# Patient Record
Sex: FEMALE | Race: WHITE | NOT HISPANIC OR LATINO | Employment: UNEMPLOYED | URBAN - METROPOLITAN AREA
[De-identification: names, ages, dates, MRNs, and addresses within clinical notes are randomized per-mention and may not be internally consistent; named-entity substitution may affect disease eponyms.]

---

## 2020-09-08 RX ORDER — LOSARTAN POTASSIUM 50 MG/1
50 TABLET ORAL DAILY
Qty: 30 TABLET | Refills: 0 | Status: SHIPPED | OUTPATIENT
Start: 2020-09-08 | End: 2020-09-14 | Stop reason: SDUPTHER

## 2020-09-08 NOTE — TELEPHONE ENCOUNTER
----- Message from Lucio Patel sent at 9/8/2020  1:26 PM CDT -----  Regarding: refill  Losartan?   Pt called states she needs this refilled  and called to [facility] on [address].  Scheduled appt for pt on Mon. 9/14/2020

## 2020-09-14 ENCOUNTER — OFFICE VISIT (OUTPATIENT)
Dept: FAMILY MEDICINE | Facility: CLINIC | Age: 50
End: 2020-09-14
Payer: MEDICAID

## 2020-09-14 ENCOUNTER — HOSPITAL ENCOUNTER (OUTPATIENT)
Dept: RADIOLOGY | Facility: HOSPITAL | Age: 50
Discharge: HOME | End: 2020-09-14
Attending: FAMILY MEDICINE
Payer: MEDICAID

## 2020-09-14 VITALS
HEART RATE: 83 BPM | HEIGHT: 60 IN | SYSTOLIC BLOOD PRESSURE: 152 MMHG | WEIGHT: 182 LBS | DIASTOLIC BLOOD PRESSURE: 102 MMHG | TEMPERATURE: 98 F | OXYGEN SATURATION: 97 % | BODY MASS INDEX: 35.73 KG/M2

## 2020-09-14 DIAGNOSIS — F32.A DEPRESSION, UNSPECIFIED DEPRESSION TYPE: ICD-10-CM

## 2020-09-14 DIAGNOSIS — R06.02 SOB (SHORTNESS OF BREATH): ICD-10-CM

## 2020-09-14 DIAGNOSIS — Z12.31 BREAST CANCER SCREENING BY MAMMOGRAM: ICD-10-CM

## 2020-09-14 DIAGNOSIS — E78.5 HYPERLIPIDEMIA, UNSPECIFIED HYPERLIPIDEMIA TYPE: ICD-10-CM

## 2020-09-14 DIAGNOSIS — M17.9 OSTEOARTHRITIS OF KNEE, UNSPECIFIED LATERALITY, UNSPECIFIED OSTEOARTHRITIS TYPE: ICD-10-CM

## 2020-09-14 DIAGNOSIS — I10 HYPERTENSION, ESSENTIAL: Primary | ICD-10-CM

## 2020-09-14 PROCEDURE — 99214 OFFICE O/P EST MOD 30 MIN: CPT | Mod: S$GLB,,, | Performed by: FAMILY MEDICINE

## 2020-09-14 PROCEDURE — 99214 PR OFFICE/OUTPT VISIT, EST, LEVL IV, 30-39 MIN: ICD-10-PCS | Mod: S$GLB,,, | Performed by: FAMILY MEDICINE

## 2020-09-14 PROCEDURE — 71046 X-RAY EXAM CHEST 2 VIEWS: CPT | Mod: TC

## 2020-09-14 RX ORDER — LOSARTAN POTASSIUM 50 MG/1
100 TABLET ORAL DAILY
Qty: 30 TABLET | Refills: 5 | Status: SHIPPED | OUTPATIENT
Start: 2020-09-14 | End: 2020-09-22 | Stop reason: ALTCHOICE

## 2020-09-14 RX ORDER — VENLAFAXINE HYDROCHLORIDE 75 MG/1
75 CAPSULE, EXTENDED RELEASE ORAL DAILY
Qty: 30 CAPSULE | Refills: 2 | Status: SHIPPED | OUTPATIENT
Start: 2020-09-14 | End: 2020-10-05 | Stop reason: SDUPTHER

## 2020-09-15 NOTE — PROGRESS NOTES
50-year-old female here for checkup.  Here recently with infected toe did clear no foreign or came of the.  Due for laboratory testing and COVID back in July.  Still feels a short of breath.  No cough.  No history of asthma.  Nonsmoker.  Nose is still congested.  Does exercise 3 times a week no chest palpitations.  No PND orthopnea pedal.    Hypertension has not taken the medication yet today.  Home 50.  Very stressed.  Home his blood twice in the past.  Storm approaching need med.  Depression.  Out a worker's comp injury for 3 years.  Injury unsteadiness.  Thyroid job.  Surgery on the knee twice.  Still pain lb told she replacement.  Sees Dr. Anne.  Weight has up 4 lb.  There depressed took medication for depression years ago with a prior.  Wellbutrin helps some but exercise better with a no interest in usual things that she likes to do.  Sits does not do her she should.    Cardiovascular slight having feeling chest now.  Cholesterol checked 1 HDL 5131.  This was year ago.  At 1 point cholesterol was 207 and.  Family history hypertension hyperlipidemia.  Grandfather a pacemaker.  Has had breast cancer.  She had BRCA test it was negative.  Social history no cigarette use no alcohol use.    Impression hypertension.  Depression.  Shortness of breath.  Degenerative joint disease of knee.  Hyperlipidemia.  BMI 35 obesity.  Breast cancer screening.    Plan start Effexor XR 75 daily 30 with 2 refills.  Mammogram ordered.  Follow-up in 1 month.  Chest x-ray ordered.  CBC CMP lipids TSH ordered.  Increase her losartan to 100 mg number 30 with 5 refills..

## 2020-09-22 ENCOUNTER — OFFICE VISIT (OUTPATIENT)
Dept: FAMILY MEDICINE | Facility: CLINIC | Age: 50
End: 2020-09-22
Payer: MEDICAID

## 2020-09-22 ENCOUNTER — LAB VISIT (OUTPATIENT)
Dept: LAB | Facility: HOSPITAL | Age: 50
End: 2020-09-22
Attending: FAMILY MEDICINE
Payer: MEDICAID

## 2020-09-22 VITALS
DIASTOLIC BLOOD PRESSURE: 96 MMHG | HEART RATE: 71 BPM | WEIGHT: 182 LBS | TEMPERATURE: 98 F | OXYGEN SATURATION: 98 % | HEIGHT: 60 IN | SYSTOLIC BLOOD PRESSURE: 146 MMHG | BODY MASS INDEX: 35.73 KG/M2

## 2020-09-22 DIAGNOSIS — I10 HYPERTENSION, ESSENTIAL: ICD-10-CM

## 2020-09-22 DIAGNOSIS — E78.5 HYPERLIPIDEMIA, UNSPECIFIED HYPERLIPIDEMIA TYPE: ICD-10-CM

## 2020-09-22 DIAGNOSIS — I10 HYPERTENSION, ESSENTIAL: Primary | ICD-10-CM

## 2020-09-22 DIAGNOSIS — R06.02 SOB (SHORTNESS OF BREATH): ICD-10-CM

## 2020-09-22 DIAGNOSIS — F32.A DEPRESSION, UNSPECIFIED DEPRESSION TYPE: ICD-10-CM

## 2020-09-22 LAB
ALBUMIN SERPL BCP-MCNC: 3.8 G/DL (ref 3.5–5.2)
ALP SERPL-CCNC: 50 U/L (ref 55–135)
ALT SERPL W/O P-5'-P-CCNC: 16 U/L (ref 10–44)
ANION GAP SERPL CALC-SCNC: 7 MMOL/L (ref 8–16)
AST SERPL-CCNC: 15 U/L (ref 10–40)
BASOPHILS # BLD AUTO: 0.06 K/UL (ref 0–0.2)
BASOPHILS NFR BLD: 0.9 % (ref 0–1.9)
BILIRUB SERPL-MCNC: 0.6 MG/DL (ref 0.1–1)
BUN SERPL-MCNC: 16 MG/DL (ref 6–20)
CALCIUM SERPL-MCNC: 8.8 MG/DL (ref 8.7–10.5)
CHLORIDE SERPL-SCNC: 98 MMOL/L (ref 95–110)
CHOLEST SERPL-MCNC: 260 MG/DL (ref 120–199)
CHOLEST/HDLC SERPL: 5.7 {RATIO} (ref 2–5)
CO2 SERPL-SCNC: 32 MMOL/L (ref 23–29)
CREAT SERPL-MCNC: 0.6 MG/DL (ref 0.5–1.4)
DIFFERENTIAL METHOD: ABNORMAL
EOSINOPHIL # BLD AUTO: 0.3 K/UL (ref 0–0.5)
EOSINOPHIL NFR BLD: 4.8 % (ref 0–8)
ERYTHROCYTE [DISTWIDTH] IN BLOOD BY AUTOMATED COUNT: 17.8 % (ref 11.5–14.5)
EST. GFR  (AFRICAN AMERICAN): >60 ML/MIN/1.73 M^2
EST. GFR  (NON AFRICAN AMERICAN): >60 ML/MIN/1.73 M^2
GLUCOSE SERPL-MCNC: 96 MG/DL (ref 70–110)
HCT VFR BLD AUTO: 38.9 % (ref 37–48.5)
HDLC SERPL-MCNC: 46 MG/DL (ref 40–75)
HDLC SERPL: 17.7 % (ref 20–50)
HGB BLD-MCNC: 11.5 G/DL (ref 12–16)
IMM GRANULOCYTES # BLD AUTO: 0.02 K/UL (ref 0–0.04)
IMM GRANULOCYTES NFR BLD AUTO: 0.3 % (ref 0–0.5)
LDLC SERPL CALC-MCNC: 198.2 MG/DL (ref 63–159)
LYMPHOCYTES # BLD AUTO: 2.1 K/UL (ref 1–4.8)
LYMPHOCYTES NFR BLD: 29.6 % (ref 18–48)
MCH RBC QN AUTO: 18.4 PG (ref 27–31)
MCHC RBC AUTO-ENTMCNC: 29.6 G/DL (ref 32–36)
MCV RBC AUTO: 62 FL (ref 82–98)
MONOCYTES # BLD AUTO: 0.5 K/UL (ref 0.3–1)
MONOCYTES NFR BLD: 6.5 % (ref 4–15)
NEUTROPHILS # BLD AUTO: 4.1 K/UL (ref 1.8–7.7)
NEUTROPHILS NFR BLD: 57.9 % (ref 38–73)
NONHDLC SERPL-MCNC: 214 MG/DL
NRBC BLD-RTO: 0 /100 WBC
PLATELET # BLD AUTO: 335 K/UL (ref 150–350)
PMV BLD AUTO: 10.6 FL (ref 9.2–12.9)
POTASSIUM SERPL-SCNC: 3.2 MMOL/L (ref 3.5–5.1)
PROT SERPL-MCNC: 6.9 G/DL (ref 6–8.4)
RBC # BLD AUTO: 6.25 M/UL (ref 4–5.4)
SODIUM SERPL-SCNC: 137 MMOL/L (ref 136–145)
TRIGL SERPL-MCNC: 79 MG/DL (ref 30–150)
TSH SERPL DL<=0.005 MIU/L-ACNC: 2.71 UIU/ML (ref 0.34–5.6)
WBC # BLD AUTO: 7.05 K/UL (ref 3.9–12.7)

## 2020-09-22 PROCEDURE — 99213 OFFICE O/P EST LOW 20 MIN: CPT | Mod: S$GLB,,, | Performed by: FAMILY MEDICINE

## 2020-09-22 PROCEDURE — 80053 COMPREHEN METABOLIC PANEL: CPT

## 2020-09-22 PROCEDURE — 80061 LIPID PANEL: CPT

## 2020-09-22 PROCEDURE — 84443 ASSAY THYROID STIM HORMONE: CPT

## 2020-09-22 PROCEDURE — 36415 COLL VENOUS BLD VENIPUNCTURE: CPT

## 2020-09-22 PROCEDURE — 85025 COMPLETE CBC W/AUTO DIFF WBC: CPT

## 2020-09-22 PROCEDURE — 99213 PR OFFICE/OUTPT VISIT, EST, LEVL III, 20-29 MIN: ICD-10-PCS | Mod: S$GLB,,, | Performed by: FAMILY MEDICINE

## 2020-09-22 RX ORDER — IRBESARTAN 300 MG/1
300 TABLET ORAL NIGHTLY
Qty: 30 TABLET | Refills: 2 | Status: SHIPPED | OUTPATIENT
Start: 2020-09-22 | End: 2020-10-05 | Stop reason: SDUPTHER

## 2020-09-23 DIAGNOSIS — E87.6 HYPOKALEMIA: Primary | ICD-10-CM

## 2020-09-23 RX ORDER — POTASSIUM CHLORIDE 20 MEQ/1
20 TABLET, EXTENDED RELEASE ORAL DAILY
Qty: 30 TABLET | Refills: 0 | Status: SHIPPED | OUTPATIENT
Start: 2020-09-23 | End: 2020-10-05 | Stop reason: SDUPTHER

## 2020-09-23 NOTE — TELEPHONE ENCOUNTER
----- Message from Zan Garcia III, MD sent at 9/22/2020  8:43 PM CDT -----  ABNORMAL followup to discuss further action.start kcl 20 qd #30. Bmp at [facility].    Left message with lab results. K Cl 20 qd to [facility]. BMP ordered to [facility]. Pt advised to follow up with labs in about a week.

## 2020-09-23 NOTE — PROGRESS NOTES
Has not done her lab she had.  Having done today.  Blood pressure is only slightly better.  On losartan 100 mg per day.  On her Effexor XR 75 daily no change GS.  No ill affects her.  Home checks are high.  Diastolic running around 100.  She is also on her hydrochlorothiazide 25 daily.  This does not seem to be herself since COVID 2 months ago.    Physical examination vital signs are noted.  Overweight female no acute distress.  Chest clear to auscultation heart regular rate rhythm without murmur gallop or rub affect is normal.    Impression hypertension.  Depression.  Obesity BMI of 35.    Plan discontinue losartan.  Changed Avapro 300 mg daily number 30 continue the HCTZ.  Follow-up in 2 weeks.  Labs as ordered.

## 2020-10-02 ENCOUNTER — LAB VISIT (OUTPATIENT)
Dept: LAB | Facility: HOSPITAL | Age: 50
End: 2020-10-02
Attending: FAMILY MEDICINE
Payer: MEDICAID

## 2020-10-02 DIAGNOSIS — E87.6 HYPOKALEMIA: ICD-10-CM

## 2020-10-02 LAB
ANION GAP SERPL CALC-SCNC: 9 MMOL/L (ref 8–16)
BUN SERPL-MCNC: 10 MG/DL (ref 6–20)
CALCIUM SERPL-MCNC: 8.9 MG/DL (ref 8.7–10.5)
CHLORIDE SERPL-SCNC: 102 MMOL/L (ref 95–110)
CO2 SERPL-SCNC: 25 MMOL/L (ref 23–29)
CREAT SERPL-MCNC: 0.8 MG/DL (ref 0.5–1.4)
EST. GFR  (AFRICAN AMERICAN): >60 ML/MIN/1.73 M^2
EST. GFR  (NON AFRICAN AMERICAN): >60 ML/MIN/1.73 M^2
GLUCOSE SERPL-MCNC: 102 MG/DL (ref 70–110)
POTASSIUM SERPL-SCNC: 3.9 MMOL/L (ref 3.5–5.1)
SODIUM SERPL-SCNC: 136 MMOL/L (ref 136–145)

## 2020-10-02 PROCEDURE — 80048 BASIC METABOLIC PNL TOTAL CA: CPT

## 2020-10-02 PROCEDURE — 36415 COLL VENOUS BLD VENIPUNCTURE: CPT

## 2020-10-05 ENCOUNTER — OFFICE VISIT (OUTPATIENT)
Dept: FAMILY MEDICINE | Facility: CLINIC | Age: 50
End: 2020-10-05
Payer: MEDICAID

## 2020-10-05 VITALS
TEMPERATURE: 98 F | HEART RATE: 78 BPM | SYSTOLIC BLOOD PRESSURE: 120 MMHG | DIASTOLIC BLOOD PRESSURE: 88 MMHG | WEIGHT: 182 LBS | HEIGHT: 60 IN | OXYGEN SATURATION: 98 % | BODY MASS INDEX: 35.73 KG/M2

## 2020-10-05 DIAGNOSIS — F32.A DEPRESSION, UNSPECIFIED DEPRESSION TYPE: ICD-10-CM

## 2020-10-05 DIAGNOSIS — E78.5 HYPERLIPIDEMIA, UNSPECIFIED HYPERLIPIDEMIA TYPE: ICD-10-CM

## 2020-10-05 DIAGNOSIS — I10 HYPERTENSION, ESSENTIAL: Primary | ICD-10-CM

## 2020-10-05 DIAGNOSIS — E87.6 HYPOKALEMIA: ICD-10-CM

## 2020-10-05 PROCEDURE — 99213 PR OFFICE/OUTPT VISIT, EST, LEVL III, 20-29 MIN: ICD-10-PCS | Mod: S$GLB,,, | Performed by: FAMILY MEDICINE

## 2020-10-05 PROCEDURE — 99213 OFFICE O/P EST LOW 20 MIN: CPT | Mod: S$GLB,,, | Performed by: FAMILY MEDICINE

## 2020-10-05 RX ORDER — VENLAFAXINE HYDROCHLORIDE 150 MG/1
150 CAPSULE, EXTENDED RELEASE ORAL DAILY
Qty: 30 CAPSULE | Refills: 5 | Status: SHIPPED | OUTPATIENT
Start: 2020-10-05 | End: 2021-02-18

## 2020-10-05 RX ORDER — HYDROCHLOROTHIAZIDE 25 MG/1
25 TABLET ORAL DAILY
Qty: 90 TABLET | Refills: 1 | Status: SHIPPED | OUTPATIENT
Start: 2020-10-05 | End: 2021-02-18 | Stop reason: SDUPTHER

## 2020-10-05 RX ORDER — ROSUVASTATIN CALCIUM 20 MG/1
20 TABLET, COATED ORAL DAILY
Qty: 90 TABLET | Refills: 1 | Status: SHIPPED | OUTPATIENT
Start: 2020-10-05 | End: 2021-04-26 | Stop reason: SDUPTHER

## 2020-10-05 RX ORDER — POTASSIUM CHLORIDE 20 MEQ/1
20 TABLET, EXTENDED RELEASE ORAL DAILY
Qty: 90 TABLET | Refills: 1 | Status: SHIPPED | OUTPATIENT
Start: 2020-10-05 | End: 2020-11-05 | Stop reason: SDUPTHER

## 2020-10-05 RX ORDER — IRBESARTAN 300 MG/1
300 TABLET ORAL NIGHTLY
Qty: 90 TABLET | Refills: 1 | Status: SHIPPED | OUTPATIENT
Start: 2020-10-05 | End: 2021-04-26 | Stop reason: SDUPTHER

## 2020-10-05 NOTE — PROGRESS NOTES
Hypertension.  On Avapro 300 HCTZ 25 and potassium 20.  Her potassium was low last check at 3.2 on replacement now.  Her hypertension is doing better.  She has been dizzy and sweating at times in the morning.  No exertional chest pain.  Her TSH is 2.71.  Blood pressure is better.  Cholesterol however is very high 260 with an LDL of 198 potassium is now 3.9 recent glucose is 96 and 102.  She felt she might be hypoglycemic but did not think that is the case.  Depression is about the same she is on Effexor XR 75 daily no better.    Physical examination vital signs are noted.  No acute distress alert oriented neck without bruit no adenopathy no thyromegaly nontender supple.  Chest clear to auscultation no wheezes or crackles no dyspnea heart regular rate rhythm without murmur gallop or rub abdomen soft nontender extremities without edema.    Impression hypertension.  Hyperlipidemia.  Depression.  Hypokalemia.    Plan refill her potassium chloride 20 mEq 90 with a refill.  Refill Avapro 390 with a refill.  Refill HCTZ 2590 with a refill.  Mag-Ox over-the-counter 250 daily start Crestor 20 mg daily 90 with a refill.  Low-fat diet discussed in detail with her increase her Effexor to  daily 30 with 5 refills.  Follow-up in 1 month.  If she continues to have the sweating episodes will workup further at that time.

## 2020-10-06 ENCOUNTER — TELEPHONE (OUTPATIENT)
Dept: FAMILY MEDICINE | Facility: CLINIC | Age: 50
End: 2020-10-06

## 2020-10-06 NOTE — TELEPHONE ENCOUNTER
Pt advised meds at [facility].    ----- Message from Lucio Patel sent at 10/6/2020 10:10 AM CDT -----  Regarding: meds  [facility] on [address]  Pt went to  meds that were called in yesterday post visit  Pharmacist states they've not received anything yet.      Pt [phone]

## 2020-11-05 ENCOUNTER — OFFICE VISIT (OUTPATIENT)
Dept: FAMILY MEDICINE | Facility: CLINIC | Age: 50
End: 2020-11-05
Payer: MEDICAID

## 2020-11-05 VITALS
BODY MASS INDEX: 36.13 KG/M2 | WEIGHT: 185 LBS | TEMPERATURE: 98 F | DIASTOLIC BLOOD PRESSURE: 82 MMHG | SYSTOLIC BLOOD PRESSURE: 130 MMHG | OXYGEN SATURATION: 97 % | HEART RATE: 80 BPM

## 2020-11-05 DIAGNOSIS — M17.31 POST-TRAUMATIC OSTEOARTHRITIS OF RIGHT KNEE: ICD-10-CM

## 2020-11-05 DIAGNOSIS — F32.A DEPRESSION, UNSPECIFIED DEPRESSION TYPE: ICD-10-CM

## 2020-11-05 DIAGNOSIS — I10 HYPERTENSION, ESSENTIAL: Primary | ICD-10-CM

## 2020-11-05 PROCEDURE — 99213 OFFICE O/P EST LOW 20 MIN: CPT | Mod: S$GLB,,, | Performed by: FAMILY MEDICINE

## 2020-11-05 PROCEDURE — 99213 PR OFFICE/OUTPT VISIT, EST, LEVL III, 20-29 MIN: ICD-10-PCS | Mod: S$GLB,,, | Performed by: FAMILY MEDICINE

## 2020-11-05 RX ORDER — BUPROPION HYDROCHLORIDE 150 MG/1
150 TABLET ORAL DAILY
Qty: 30 TABLET | Refills: 1 | Status: SHIPPED | OUTPATIENT
Start: 2020-11-05 | End: 2021-01-12 | Stop reason: DRUGHIGH

## 2020-11-05 RX ORDER — POTASSIUM CHLORIDE 20 MEQ/1
20 TABLET, EXTENDED RELEASE ORAL DAILY
Qty: 90 TABLET | Refills: 1 | Status: SHIPPED | OUTPATIENT
Start: 2020-11-05 | End: 2020-11-05 | Stop reason: SDUPTHER

## 2020-11-05 RX ORDER — POTASSIUM CHLORIDE 20 MEQ/1
20 TABLET, EXTENDED RELEASE ORAL DAILY
Qty: 90 TABLET | Refills: 1 | Status: SHIPPED | OUTPATIENT
Start: 2020-11-05 | End: 2022-04-05 | Stop reason: SDUPTHER

## 2020-11-06 NOTE — PROGRESS NOTES
Blood pressure is good here.  Also home numbers have been good.  Not doing well with the Effexor in the 150 mg.  Still depressed.  Not anxious.  Right knee needs replacement.  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  Can not stand long.  Worker's comp issue.  Knee replacement denied by them.  Court late due to COVID.  No history of seizure did take Wellbutrin sometime in the past.  But also did okay on Effexor.    Physical examination vital signs are noted.  Well-developed well-nourished female mode overweight.  No acute distress.  Neck is without bruit no adenopathy.  Chest clear to auscultation no wheezes or crackles extremities without edema positive pulses.    Impression hypertension controlled.  Depression.  Degenerative joint disease the right knee.    Plan refill potassium chloride 20 mg 90 with a refill.  BMP ordered.  Add Wellbutrin  daily 30 with a refill.  Follow-up in 1 month.

## 2020-11-12 ENCOUNTER — TELEPHONE (OUTPATIENT)
Dept: FAMILY MEDICINE | Facility: CLINIC | Age: 50
End: 2020-11-12

## 2020-11-12 NOTE — TELEPHONE ENCOUNTER
----- Message from Reggie Knott sent at 11/11/2020  4:35 PM CST -----  Contact: Self  Type: Needs Medical Advice  Who Called:  Patient  Best Call Back Number: [phone] (home)    Additional Information: Patient states Dr. Garcia told her to taper off of venlafaxine (EFFEXOR-XR) 150 MG Cp24, but but same dosage remained in the pharmacy.. She requests clarification and further instructions.       EFFEXOR XR 75 MG #14 QD  [facility]

## 2020-12-11 ENCOUNTER — TELEPHONE (OUTPATIENT)
Dept: FAMILY MEDICINE | Facility: CLINIC | Age: 50
End: 2020-12-11

## 2020-12-11 NOTE — TELEPHONE ENCOUNTER
----- Message from Rodriguez Leos sent at 12/11/2020  3:33 PM CST -----  Contact: pt at : [phone]  Type: Needs Medical Advice  Who Called:  karol  Best Call Back Number: [phone]  Additional Information: pt is requesting a call back from the office. Please call back and advise

## 2021-01-04 ENCOUNTER — PATIENT MESSAGE (OUTPATIENT)
Dept: ADMINISTRATIVE | Facility: HOSPITAL | Age: 51
End: 2021-01-04

## 2021-01-06 DIAGNOSIS — Z12.31 ENCOUNTER FOR SCREENING MAMMOGRAM FOR MALIGNANT NEOPLASM OF BREAST: Primary | ICD-10-CM

## 2021-01-07 ENCOUNTER — TELEPHONE (OUTPATIENT)
Dept: FAMILY MEDICINE | Facility: CLINIC | Age: 51
End: 2021-01-07

## 2021-01-11 ENCOUNTER — TELEPHONE (OUTPATIENT)
Dept: FAMILY MEDICINE | Facility: CLINIC | Age: 51
End: 2021-01-11

## 2021-01-12 ENCOUNTER — OFFICE VISIT (OUTPATIENT)
Dept: FAMILY MEDICINE | Facility: CLINIC | Age: 51
End: 2021-01-12
Payer: MEDICAID

## 2021-01-12 VITALS
TEMPERATURE: 98 F | DIASTOLIC BLOOD PRESSURE: 82 MMHG | BODY MASS INDEX: 35.93 KG/M2 | SYSTOLIC BLOOD PRESSURE: 124 MMHG | HEART RATE: 82 BPM | OXYGEN SATURATION: 98 % | WEIGHT: 183 LBS | HEIGHT: 60 IN

## 2021-01-12 DIAGNOSIS — I10 HYPERTENSION, ESSENTIAL: ICD-10-CM

## 2021-01-12 DIAGNOSIS — F32.A DEPRESSION, UNSPECIFIED DEPRESSION TYPE: Primary | ICD-10-CM

## 2021-01-12 PROCEDURE — 99213 PR OFFICE/OUTPT VISIT, EST, LEVL III, 20-29 MIN: ICD-10-PCS | Mod: S$GLB,,, | Performed by: FAMILY MEDICINE

## 2021-01-12 PROCEDURE — 99213 OFFICE O/P EST LOW 20 MIN: CPT | Mod: S$GLB,,, | Performed by: FAMILY MEDICINE

## 2021-01-12 RX ORDER — CYCLOBENZAPRINE HCL 10 MG
10 TABLET ORAL NIGHTLY
COMMUNITY
Start: 2020-11-15

## 2021-01-12 RX ORDER — LIDOCAINE 50 MG/G
PATCH TOPICAL
COMMUNITY
Start: 2020-12-18

## 2021-01-12 RX ORDER — DICLOFENAC EPOLAMINE 0.01 G/1
SYSTEM TOPICAL
COMMUNITY
Start: 2020-12-18

## 2021-01-12 RX ORDER — BUPROPION HYDROCHLORIDE 300 MG/1
300 TABLET ORAL DAILY
Qty: 30 TABLET | Refills: 2 | Status: SHIPPED | OUTPATIENT
Start: 2021-01-12 | End: 2021-02-18 | Stop reason: SDUPTHER

## 2021-01-20 ENCOUNTER — HOSPITAL ENCOUNTER (OUTPATIENT)
Dept: RADIOLOGY | Facility: HOSPITAL | Age: 51
Discharge: HOME | End: 2021-01-20
Payer: MEDICAID

## 2021-01-20 DIAGNOSIS — Z12.31 ENCOUNTER FOR SCREENING MAMMOGRAM FOR MALIGNANT NEOPLASM OF BREAST: ICD-10-CM

## 2021-01-20 PROCEDURE — 77067 SCR MAMMO BI INCL CAD: CPT | Mod: TC,PO

## 2021-02-09 ENCOUNTER — HOSPITAL ENCOUNTER (OUTPATIENT)
Dept: RADIOLOGY | Facility: HOSPITAL | Age: 51
Discharge: HOME | End: 2021-02-09
Attending: FAMILY MEDICINE
Payer: MEDICAID

## 2021-02-09 DIAGNOSIS — R92.2 INCONCLUSIVE MAMMOGRAM: ICD-10-CM

## 2021-02-09 PROCEDURE — 77061 BREAST TOMOSYNTHESIS UNI: CPT | Mod: TC,PO,LT

## 2021-02-09 PROCEDURE — 76642 ULTRASOUND BREAST LIMITED: CPT | Mod: TC,PO,LT

## 2021-02-15 ENCOUNTER — TELEPHONE (OUTPATIENT)
Dept: FAMILY MEDICINE | Facility: CLINIC | Age: 51
End: 2021-02-15

## 2021-02-17 ENCOUNTER — TELEPHONE (OUTPATIENT)
Dept: FAMILY MEDICINE | Facility: CLINIC | Age: 51
End: 2021-02-17

## 2021-02-18 ENCOUNTER — OFFICE VISIT (OUTPATIENT)
Dept: FAMILY MEDICINE | Facility: CLINIC | Age: 51
End: 2021-02-18
Payer: MEDICAID

## 2021-02-18 VITALS
WEIGHT: 184 LBS | BODY MASS INDEX: 35.94 KG/M2 | HEART RATE: 78 BPM | SYSTOLIC BLOOD PRESSURE: 120 MMHG | TEMPERATURE: 97 F | OXYGEN SATURATION: 97 % | DIASTOLIC BLOOD PRESSURE: 76 MMHG

## 2021-02-18 DIAGNOSIS — M17.9 OSTEOARTHRITIS OF KNEE, UNSPECIFIED LATERALITY, UNSPECIFIED OSTEOARTHRITIS TYPE: ICD-10-CM

## 2021-02-18 DIAGNOSIS — I10 HYPERTENSION, ESSENTIAL: Primary | ICD-10-CM

## 2021-02-18 DIAGNOSIS — F32.A DEPRESSION, UNSPECIFIED DEPRESSION TYPE: ICD-10-CM

## 2021-02-18 PROCEDURE — 99213 PR OFFICE/OUTPT VISIT, EST, LEVL III, 20-29 MIN: ICD-10-PCS | Mod: S$GLB,,, | Performed by: FAMILY MEDICINE

## 2021-02-18 PROCEDURE — 99213 OFFICE O/P EST LOW 20 MIN: CPT | Mod: S$GLB,,, | Performed by: FAMILY MEDICINE

## 2021-02-19 ENCOUNTER — TELEPHONE (OUTPATIENT)
Dept: FAMILY MEDICINE | Facility: CLINIC | Age: 51
End: 2021-02-19

## 2021-02-19 RX ORDER — BUPROPION HYDROCHLORIDE 300 MG/1
300 TABLET ORAL DAILY
Qty: 30 TABLET | Refills: 2 | Status: SHIPPED | OUTPATIENT
Start: 2021-02-19 | End: 2021-04-26 | Stop reason: SDUPTHER

## 2021-02-19 RX ORDER — TRAZODONE HYDROCHLORIDE 50 MG/1
50 TABLET ORAL NIGHTLY
Qty: 30 TABLET | Refills: 2 | Status: CANCELLED | OUTPATIENT
Start: 2021-02-19 | End: 2022-02-19

## 2021-02-19 RX ORDER — BUPROPION HYDROCHLORIDE 150 MG/1
150 TABLET ORAL DAILY
Qty: 30 TABLET | Refills: 2 | Status: CANCELLED | OUTPATIENT
Start: 2021-02-19 | End: 2022-02-19

## 2021-02-19 RX ORDER — TRAZODONE HYDROCHLORIDE 50 MG/1
50 TABLET ORAL NIGHTLY
Qty: 30 TABLET | Refills: 2 | Status: SHIPPED | OUTPATIENT
Start: 2021-02-19 | End: 2021-10-04 | Stop reason: SDUPTHER

## 2021-02-19 RX ORDER — HYDROCHLOROTHIAZIDE 25 MG/1
25 TABLET ORAL DAILY
Qty: 90 TABLET | Refills: 1 | Status: SHIPPED | OUTPATIENT
Start: 2021-02-19 | End: 2021-10-04 | Stop reason: SDUPTHER

## 2021-02-19 RX ORDER — BUPROPION HYDROCHLORIDE 150 MG/1
150 TABLET ORAL NIGHTLY
Qty: 30 TABLET | Refills: 2 | Status: SHIPPED | OUTPATIENT
Start: 2021-02-19 | End: 2021-04-26 | Stop reason: SDUPTHER

## 2021-04-26 ENCOUNTER — LAB VISIT (OUTPATIENT)
Dept: LAB | Facility: HOSPITAL | Age: 51
End: 2021-04-26
Attending: FAMILY MEDICINE
Payer: MEDICAID

## 2021-04-26 ENCOUNTER — OFFICE VISIT (OUTPATIENT)
Dept: FAMILY MEDICINE | Facility: CLINIC | Age: 51
End: 2021-04-26
Payer: MEDICAID

## 2021-04-26 VITALS
HEART RATE: 87 BPM | BODY MASS INDEX: 35.34 KG/M2 | HEIGHT: 60 IN | WEIGHT: 180 LBS | SYSTOLIC BLOOD PRESSURE: 123 MMHG | DIASTOLIC BLOOD PRESSURE: 79 MMHG | OXYGEN SATURATION: 99 % | TEMPERATURE: 97 F

## 2021-04-26 DIAGNOSIS — G47.00 INSOMNIA, UNSPECIFIED TYPE: ICD-10-CM

## 2021-04-26 DIAGNOSIS — I10 HYPERTENSION, ESSENTIAL: ICD-10-CM

## 2021-04-26 DIAGNOSIS — E78.5 HYPERLIPIDEMIA, UNSPECIFIED HYPERLIPIDEMIA TYPE: ICD-10-CM

## 2021-04-26 DIAGNOSIS — F32.A DEPRESSION, UNSPECIFIED DEPRESSION TYPE: ICD-10-CM

## 2021-04-26 DIAGNOSIS — Z20.818 STREP THROAT EXPOSURE: ICD-10-CM

## 2021-04-26 DIAGNOSIS — J02.9 SORE THROAT: Primary | ICD-10-CM

## 2021-04-26 LAB
ANION GAP SERPL CALC-SCNC: 11 MMOL/L (ref 8–16)
BUN SERPL-MCNC: 13 MG/DL (ref 6–20)
CALCIUM SERPL-MCNC: 8.5 MG/DL (ref 8.7–10.5)
CHLORIDE SERPL-SCNC: 105 MMOL/L (ref 95–110)
CO2 SERPL-SCNC: 22 MMOL/L (ref 23–29)
CREAT SERPL-MCNC: 0.6 MG/DL (ref 0.5–1.4)
CTP QC/QA: YES
EST. GFR  (AFRICAN AMERICAN): >60 ML/MIN/1.73 M^2
EST. GFR  (NON AFRICAN AMERICAN): >60 ML/MIN/1.73 M^2
GLUCOSE SERPL-MCNC: 95 MG/DL (ref 70–110)
POTASSIUM SERPL-SCNC: 3.8 MMOL/L (ref 3.5–5.1)
S PYO RRNA THROAT QL PROBE: NEGATIVE
SODIUM SERPL-SCNC: 138 MMOL/L (ref 136–145)

## 2021-04-26 PROCEDURE — 87880 STREP A ASSAY W/OPTIC: CPT | Mod: QW,,, | Performed by: FAMILY MEDICINE

## 2021-04-26 PROCEDURE — 87880 POCT RAPID STREP A: ICD-10-PCS | Mod: QW,,, | Performed by: FAMILY MEDICINE

## 2021-04-26 PROCEDURE — 99214 PR OFFICE/OUTPT VISIT, EST, LEVL IV, 30-39 MIN: ICD-10-PCS | Mod: 25,S$GLB,, | Performed by: FAMILY MEDICINE

## 2021-04-26 PROCEDURE — 36415 COLL VENOUS BLD VENIPUNCTURE: CPT | Performed by: FAMILY MEDICINE

## 2021-04-26 PROCEDURE — 80048 BASIC METABOLIC PNL TOTAL CA: CPT | Performed by: FAMILY MEDICINE

## 2021-04-26 PROCEDURE — 99214 OFFICE O/P EST MOD 30 MIN: CPT | Mod: 25,S$GLB,, | Performed by: FAMILY MEDICINE

## 2021-04-26 RX ORDER — IRBESARTAN 300 MG/1
300 TABLET ORAL NIGHTLY
Qty: 90 TABLET | Refills: 1 | Status: SHIPPED | OUTPATIENT
Start: 2021-04-26 | End: 2021-11-04

## 2021-04-26 RX ORDER — ESCITALOPRAM OXALATE 10 MG/1
10 TABLET ORAL DAILY
Qty: 30 TABLET | Refills: 1 | Status: SHIPPED | OUTPATIENT
Start: 2021-04-26 | End: 2021-08-03

## 2021-04-26 RX ORDER — BUPROPION HYDROCHLORIDE 300 MG/1
300 TABLET ORAL DAILY
Qty: 30 TABLET | Refills: 2 | Status: SHIPPED | OUTPATIENT
Start: 2021-04-26 | End: 2021-06-28 | Stop reason: SDUPTHER

## 2021-04-26 RX ORDER — ROSUVASTATIN CALCIUM 20 MG/1
20 TABLET, COATED ORAL DAILY
Qty: 90 TABLET | Refills: 1 | Status: SHIPPED | OUTPATIENT
Start: 2021-04-26 | End: 2021-11-04

## 2021-04-26 RX ORDER — BUPROPION HYDROCHLORIDE 150 MG/1
150 TABLET ORAL NIGHTLY
Qty: 30 TABLET | Refills: 2 | Status: SHIPPED | OUTPATIENT
Start: 2021-04-26 | End: 2021-07-25

## 2021-05-01 ENCOUNTER — NURSE TRIAGE (OUTPATIENT)
Dept: ADMINISTRATIVE | Facility: CLINIC | Age: 51
End: 2021-05-01

## 2021-05-03 ENCOUNTER — TELEPHONE (OUTPATIENT)
Dept: FAMILY MEDICINE | Facility: CLINIC | Age: 51
End: 2021-05-03

## 2021-05-27 ENCOUNTER — OFFICE VISIT (OUTPATIENT)
Dept: FAMILY MEDICINE | Facility: CLINIC | Age: 51
End: 2021-05-27
Payer: MEDICAID

## 2021-05-27 VITALS
TEMPERATURE: 98 F | WEIGHT: 176 LBS | DIASTOLIC BLOOD PRESSURE: 71 MMHG | HEART RATE: 75 BPM | OXYGEN SATURATION: 99 % | BODY MASS INDEX: 34.55 KG/M2 | HEIGHT: 60 IN | SYSTOLIC BLOOD PRESSURE: 115 MMHG

## 2021-05-27 DIAGNOSIS — F32.A DEPRESSION, UNSPECIFIED DEPRESSION TYPE: ICD-10-CM

## 2021-05-27 DIAGNOSIS — I10 HYPERTENSION, ESSENTIAL: ICD-10-CM

## 2021-05-27 DIAGNOSIS — E78.5 HYPERLIPIDEMIA, UNSPECIFIED HYPERLIPIDEMIA TYPE: Primary | ICD-10-CM

## 2021-05-27 PROCEDURE — 99213 PR OFFICE/OUTPT VISIT, EST, LEVL III, 20-29 MIN: ICD-10-PCS | Mod: S$GLB,,, | Performed by: FAMILY MEDICINE

## 2021-05-27 PROCEDURE — 99213 OFFICE O/P EST LOW 20 MIN: CPT | Mod: S$GLB,,, | Performed by: FAMILY MEDICINE

## 2021-06-03 ENCOUNTER — TELEPHONE (OUTPATIENT)
Dept: PHARMACY | Facility: CLINIC | Age: 51
End: 2021-06-03

## 2021-06-28 ENCOUNTER — OFFICE VISIT (OUTPATIENT)
Dept: FAMILY MEDICINE | Facility: CLINIC | Age: 51
End: 2021-06-28
Payer: MEDICAID

## 2021-06-28 VITALS
TEMPERATURE: 99 F | SYSTOLIC BLOOD PRESSURE: 124 MMHG | BODY MASS INDEX: 33.38 KG/M2 | OXYGEN SATURATION: 98 % | HEIGHT: 60 IN | WEIGHT: 170 LBS | DIASTOLIC BLOOD PRESSURE: 72 MMHG | HEART RATE: 70 BPM

## 2021-06-28 DIAGNOSIS — F32.A DEPRESSION, UNSPECIFIED DEPRESSION TYPE: ICD-10-CM

## 2021-06-28 DIAGNOSIS — I10 HYPERTENSION, ESSENTIAL: Primary | ICD-10-CM

## 2021-06-28 PROCEDURE — 99213 OFFICE O/P EST LOW 20 MIN: CPT | Mod: S$GLB,,, | Performed by: FAMILY MEDICINE

## 2021-06-28 PROCEDURE — 99213 PR OFFICE/OUTPT VISIT, EST, LEVL III, 20-29 MIN: ICD-10-PCS | Mod: S$GLB,,, | Performed by: FAMILY MEDICINE

## 2021-06-28 RX ORDER — BUPROPION HYDROCHLORIDE 300 MG/1
300 TABLET ORAL DAILY
Qty: 30 TABLET | Refills: 2 | Status: SHIPPED | OUTPATIENT
Start: 2021-06-28 | End: 2021-10-04 | Stop reason: SDUPTHER

## 2021-07-16 ENCOUNTER — TELEPHONE (OUTPATIENT)
Dept: FAMILY MEDICINE | Facility: CLINIC | Age: 51
End: 2021-07-16

## 2021-08-03 ENCOUNTER — OFFICE VISIT (OUTPATIENT)
Dept: FAMILY MEDICINE | Facility: CLINIC | Age: 51
End: 2021-08-03
Payer: MEDICAID

## 2021-08-03 VITALS
TEMPERATURE: 97 F | OXYGEN SATURATION: 98 % | BODY MASS INDEX: 32 KG/M2 | DIASTOLIC BLOOD PRESSURE: 66 MMHG | HEART RATE: 68 BPM | SYSTOLIC BLOOD PRESSURE: 115 MMHG | HEIGHT: 60 IN | WEIGHT: 163 LBS

## 2021-08-03 DIAGNOSIS — J30.9 ALLERGIC RHINITIS, UNSPECIFIED SEASONALITY, UNSPECIFIED TRIGGER: ICD-10-CM

## 2021-08-03 DIAGNOSIS — I10 HYPERTENSION, ESSENTIAL: Primary | ICD-10-CM

## 2021-08-03 DIAGNOSIS — F32.A DEPRESSION, UNSPECIFIED DEPRESSION TYPE: ICD-10-CM

## 2021-08-03 PROCEDURE — 99214 PR OFFICE/OUTPT VISIT, EST, LEVL IV, 30-39 MIN: ICD-10-PCS | Mod: S$GLB,,, | Performed by: FAMILY MEDICINE

## 2021-08-03 PROCEDURE — 99214 OFFICE O/P EST MOD 30 MIN: CPT | Mod: S$GLB,,, | Performed by: FAMILY MEDICINE

## 2021-08-03 RX ORDER — MONTELUKAST SODIUM 10 MG/1
10 TABLET ORAL NIGHTLY
Qty: 30 TABLET | Refills: 2 | Status: SHIPPED | OUTPATIENT
Start: 2021-08-03 | End: 2021-09-02

## 2021-08-03 RX ORDER — DULOXETIN HYDROCHLORIDE 30 MG/1
30 CAPSULE, DELAYED RELEASE ORAL DAILY
Qty: 30 CAPSULE | Refills: 0 | Status: SHIPPED | OUTPATIENT
Start: 2021-08-03 | End: 2021-10-04

## 2021-08-09 ENCOUNTER — TELEPHONE (OUTPATIENT)
Dept: FAMILY MEDICINE | Facility: CLINIC | Age: 51
End: 2021-08-09

## 2021-10-04 ENCOUNTER — OFFICE VISIT (OUTPATIENT)
Dept: FAMILY MEDICINE | Facility: CLINIC | Age: 51
End: 2021-10-04
Payer: MEDICAID

## 2021-10-04 VITALS
HEART RATE: 76 BPM | TEMPERATURE: 98 F | SYSTOLIC BLOOD PRESSURE: 110 MMHG | WEIGHT: 162 LBS | BODY MASS INDEX: 31.8 KG/M2 | OXYGEN SATURATION: 100 % | HEIGHT: 60 IN | DIASTOLIC BLOOD PRESSURE: 67 MMHG

## 2021-10-04 DIAGNOSIS — F32.A DEPRESSION, UNSPECIFIED DEPRESSION TYPE: ICD-10-CM

## 2021-10-04 DIAGNOSIS — G47.00 INSOMNIA, UNSPECIFIED TYPE: ICD-10-CM

## 2021-10-04 DIAGNOSIS — M19.90 OSTEOARTHRITIS, UNSPECIFIED OSTEOARTHRITIS TYPE, UNSPECIFIED SITE: ICD-10-CM

## 2021-10-04 DIAGNOSIS — I10 HYPERTENSION, ESSENTIAL: Primary | ICD-10-CM

## 2021-10-04 DIAGNOSIS — J30.9 ALLERGIC RHINITIS, UNSPECIFIED SEASONALITY, UNSPECIFIED TRIGGER: ICD-10-CM

## 2021-10-04 PROCEDURE — 90471 IMMUNIZATION ADMIN: CPT | Mod: S$GLB,,, | Performed by: FAMILY MEDICINE

## 2021-10-04 PROCEDURE — 90686 FLU VACCINE (QUAD) GREATER THAN OR EQUAL TO 3YO PRESERVATIVE FREE IM: ICD-10-PCS | Mod: S$GLB,,, | Performed by: FAMILY MEDICINE

## 2021-10-04 PROCEDURE — 90471 FLU VACCINE (QUAD) GREATER THAN OR EQUAL TO 3YO PRESERVATIVE FREE IM: ICD-10-PCS | Mod: S$GLB,,, | Performed by: FAMILY MEDICINE

## 2021-10-04 PROCEDURE — 99214 OFFICE O/P EST MOD 30 MIN: CPT | Mod: 25,S$GLB,, | Performed by: FAMILY MEDICINE

## 2021-10-04 PROCEDURE — 90686 IIV4 VACC NO PRSV 0.5 ML IM: CPT | Mod: S$GLB,,, | Performed by: FAMILY MEDICINE

## 2021-10-04 PROCEDURE — 99214 PR OFFICE/OUTPT VISIT, EST, LEVL IV, 30-39 MIN: ICD-10-PCS | Mod: 25,S$GLB,, | Performed by: FAMILY MEDICINE

## 2021-10-04 RX ORDER — TRAZODONE HYDROCHLORIDE 50 MG/1
50 TABLET ORAL NIGHTLY
Qty: 90 TABLET | Refills: 1 | Status: SHIPPED | OUTPATIENT
Start: 2021-10-04 | End: 2022-04-05 | Stop reason: SDUPTHER

## 2021-10-04 RX ORDER — HYDROCHLOROTHIAZIDE 25 MG/1
25 TABLET ORAL DAILY
Qty: 90 TABLET | Refills: 1 | Status: SHIPPED | OUTPATIENT
Start: 2021-10-04 | End: 2021-11-04

## 2021-10-04 RX ORDER — MONTELUKAST SODIUM 10 MG/1
10 TABLET ORAL DAILY
Qty: 90 TABLET | Refills: 1 | Status: SHIPPED | OUTPATIENT
Start: 2021-10-04 | End: 2022-05-24

## 2021-10-04 RX ORDER — DULOXETIN HYDROCHLORIDE 60 MG/1
60 CAPSULE, DELAYED RELEASE ORAL DAILY
Qty: 90 CAPSULE | Refills: 1 | Status: SHIPPED | OUTPATIENT
Start: 2021-10-04 | End: 2021-11-29 | Stop reason: SDUPTHER

## 2021-10-04 RX ORDER — MONTELUKAST SODIUM 10 MG/1
TABLET ORAL
COMMUNITY
Start: 2021-09-02 | End: 2021-10-04 | Stop reason: SDUPTHER

## 2021-10-04 RX ORDER — BUPROPION HYDROCHLORIDE 300 MG/1
300 TABLET ORAL DAILY
Qty: 90 TABLET | Refills: 1 | Status: SHIPPED | OUTPATIENT
Start: 2021-10-04 | End: 2021-11-29 | Stop reason: SDUPTHER

## 2021-10-05 PROBLEM — M19.90 OSTEOARTHRITIS: Status: ACTIVE | Noted: 2021-10-05

## 2021-11-29 ENCOUNTER — OFFICE VISIT (OUTPATIENT)
Dept: FAMILY MEDICINE | Facility: CLINIC | Age: 51
End: 2021-11-29
Payer: MEDICAID

## 2021-11-29 VITALS
WEIGHT: 170 LBS | BODY MASS INDEX: 33.38 KG/M2 | TEMPERATURE: 97 F | DIASTOLIC BLOOD PRESSURE: 77 MMHG | OXYGEN SATURATION: 98 % | HEART RATE: 67 BPM | HEIGHT: 60 IN | SYSTOLIC BLOOD PRESSURE: 128 MMHG

## 2021-11-29 DIAGNOSIS — F32.A DEPRESSION, UNSPECIFIED DEPRESSION TYPE: ICD-10-CM

## 2021-11-29 DIAGNOSIS — I10 HYPERTENSION, ESSENTIAL: Primary | ICD-10-CM

## 2021-11-29 DIAGNOSIS — M19.90 OSTEOARTHRITIS, UNSPECIFIED OSTEOARTHRITIS TYPE, UNSPECIFIED SITE: ICD-10-CM

## 2021-11-29 DIAGNOSIS — G47.00 INSOMNIA, UNSPECIFIED TYPE: ICD-10-CM

## 2021-11-29 PROCEDURE — 4010F ACE/ARB THERAPY RXD/TAKEN: CPT | Mod: CPTII,S$GLB,, | Performed by: FAMILY MEDICINE

## 2021-11-29 PROCEDURE — 99213 PR OFFICE/OUTPT VISIT, EST, LEVL III, 20-29 MIN: ICD-10-PCS | Mod: S$GLB,,, | Performed by: FAMILY MEDICINE

## 2021-11-29 PROCEDURE — 99213 OFFICE O/P EST LOW 20 MIN: CPT | Mod: S$GLB,,, | Performed by: FAMILY MEDICINE

## 2021-11-29 PROCEDURE — 4010F PR ACE/ARB THEARPY RXD/TAKEN: ICD-10-PCS | Mod: CPTII,S$GLB,, | Performed by: FAMILY MEDICINE

## 2021-11-29 RX ORDER — DULOXETIN HYDROCHLORIDE 30 MG/1
30 CAPSULE, DELAYED RELEASE ORAL DAILY
Qty: 30 CAPSULE | Refills: 1 | Status: SHIPPED | OUTPATIENT
Start: 2021-11-29 | End: 2022-01-14 | Stop reason: SDUPTHER

## 2021-11-29 RX ORDER — BUPROPION HYDROCHLORIDE 150 MG/1
150 TABLET ORAL DAILY
Qty: 30 TABLET | Refills: 1 | Status: SHIPPED | OUTPATIENT
Start: 2021-11-29 | End: 2022-01-14 | Stop reason: ALTCHOICE

## 2022-01-14 ENCOUNTER — OFFICE VISIT (OUTPATIENT)
Dept: FAMILY MEDICINE | Facility: CLINIC | Age: 52
End: 2022-01-14
Payer: MEDICAID

## 2022-01-14 VITALS
DIASTOLIC BLOOD PRESSURE: 78 MMHG | OXYGEN SATURATION: 98 % | BODY MASS INDEX: 32.59 KG/M2 | HEART RATE: 69 BPM | WEIGHT: 166 LBS | HEIGHT: 60 IN | TEMPERATURE: 98 F | SYSTOLIC BLOOD PRESSURE: 128 MMHG

## 2022-01-14 DIAGNOSIS — N95.1 MENOPAUSAL SYNDROME: ICD-10-CM

## 2022-01-14 DIAGNOSIS — I10 HYPERTENSION, ESSENTIAL: Primary | ICD-10-CM

## 2022-01-14 DIAGNOSIS — F32.A DEPRESSION, UNSPECIFIED DEPRESSION TYPE: ICD-10-CM

## 2022-01-14 DIAGNOSIS — Z85.3 HX OF BREAST CANCER: ICD-10-CM

## 2022-01-14 DIAGNOSIS — Z77.120 MOLD EXPOSURE: ICD-10-CM

## 2022-01-14 PROCEDURE — 3008F BODY MASS INDEX DOCD: CPT | Mod: CPTII,S$GLB,, | Performed by: FAMILY MEDICINE

## 2022-01-14 PROCEDURE — 3078F PR MOST RECENT DIASTOLIC BLOOD PRESSURE < 80 MM HG: ICD-10-PCS | Mod: CPTII,S$GLB,, | Performed by: FAMILY MEDICINE

## 2022-01-14 PROCEDURE — 1159F MED LIST DOCD IN RCRD: CPT | Mod: CPTII,S$GLB,, | Performed by: FAMILY MEDICINE

## 2022-01-14 PROCEDURE — 3008F PR BODY MASS INDEX (BMI) DOCUMENTED: ICD-10-PCS | Mod: CPTII,S$GLB,, | Performed by: FAMILY MEDICINE

## 2022-01-14 PROCEDURE — 99214 OFFICE O/P EST MOD 30 MIN: CPT | Mod: S$GLB,,, | Performed by: FAMILY MEDICINE

## 2022-01-14 PROCEDURE — 3074F SYST BP LT 130 MM HG: CPT | Mod: CPTII,S$GLB,, | Performed by: FAMILY MEDICINE

## 2022-01-14 PROCEDURE — 99214 PR OFFICE/OUTPT VISIT, EST, LEVL IV, 30-39 MIN: ICD-10-PCS | Mod: S$GLB,,, | Performed by: FAMILY MEDICINE

## 2022-01-14 PROCEDURE — 1159F PR MEDICATION LIST DOCUMENTED IN MEDICAL RECORD: ICD-10-PCS | Mod: CPTII,S$GLB,, | Performed by: FAMILY MEDICINE

## 2022-01-14 PROCEDURE — 3078F DIAST BP <80 MM HG: CPT | Mod: CPTII,S$GLB,, | Performed by: FAMILY MEDICINE

## 2022-01-14 PROCEDURE — 3074F PR MOST RECENT SYSTOLIC BLOOD PRESSURE < 130 MM HG: ICD-10-PCS | Mod: CPTII,S$GLB,, | Performed by: FAMILY MEDICINE

## 2022-01-14 RX ORDER — HYLAN G-F 20 16MG/2ML
SYRINGE (ML) INTRAARTICULAR
COMMUNITY
Start: 2021-09-13 | End: 2022-04-05

## 2022-01-14 RX ORDER — DULOXETIN HYDROCHLORIDE 20 MG/1
20 CAPSULE, DELAYED RELEASE ORAL DAILY
Qty: 30 CAPSULE | Refills: 0 | Status: SHIPPED | OUTPATIENT
Start: 2022-01-14 | End: 2022-04-05
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
## 2022-01-18 PROBLEM — Z85.3 HX OF BREAST CANCER: Status: ACTIVE | Noted: 2022-01-18

## 2022-02-18 ENCOUNTER — LAB VISIT (OUTPATIENT)
Dept: LAB | Facility: HOSPITAL | Age: 52
End: 2022-02-18
Attending: FAMILY MEDICINE
Payer: MEDICAID

## 2022-02-18 DIAGNOSIS — F32.A DEPRESSION, UNSPECIFIED DEPRESSION TYPE: ICD-10-CM

## 2022-02-18 DIAGNOSIS — N95.1 MENOPAUSAL SYNDROME: ICD-10-CM

## 2022-02-18 PROCEDURE — 84403 ASSAY OF TOTAL TESTOSTERONE: CPT | Performed by: FAMILY MEDICINE

## 2022-02-18 PROCEDURE — 83001 ASSAY OF GONADOTROPIN (FSH): CPT | Performed by: FAMILY MEDICINE

## 2022-02-18 PROCEDURE — 36415 COLL VENOUS BLD VENIPUNCTURE: CPT | Performed by: FAMILY MEDICINE

## 2022-02-18 PROCEDURE — 82670 ASSAY OF TOTAL ESTRADIOL: CPT | Performed by: FAMILY MEDICINE

## 2022-02-19 LAB
ESTRADIOL SERPL-MCNC: 82.4 PG/ML
FSH SERPL-ACNC: 21.2 MIU/ML
TESTOST SERPL-MCNC: 14 NG/DL (ref 4–50)

## 2022-03-18 ENCOUNTER — PATIENT MESSAGE (OUTPATIENT)
Dept: ADMINISTRATIVE | Facility: HOSPITAL | Age: 52
End: 2022-03-18
Payer: MEDICAID

## 2022-03-24 ENCOUNTER — TELEPHONE (OUTPATIENT)
Dept: FAMILY MEDICINE | Facility: CLINIC | Age: 52
End: 2022-03-24
Payer: MEDICAID

## 2022-04-05 ENCOUNTER — OFFICE VISIT (OUTPATIENT)
Dept: FAMILY MEDICINE | Facility: CLINIC | Age: 52
End: 2022-04-05
Payer: MEDICAID

## 2022-04-05 VITALS
HEART RATE: 70 BPM | DIASTOLIC BLOOD PRESSURE: 80 MMHG | TEMPERATURE: 98 F | SYSTOLIC BLOOD PRESSURE: 128 MMHG | HEIGHT: 60 IN | OXYGEN SATURATION: 96 % | BODY MASS INDEX: 34.75 KG/M2 | WEIGHT: 177 LBS

## 2022-04-05 DIAGNOSIS — M25.561 ARTHRALGIA OF RIGHT KNEE: ICD-10-CM

## 2022-04-05 DIAGNOSIS — E78.5 HYPERLIPIDEMIA, UNSPECIFIED HYPERLIPIDEMIA TYPE: ICD-10-CM

## 2022-04-05 DIAGNOSIS — D56.9 THALASSEMIA, UNSPECIFIED TYPE: ICD-10-CM

## 2022-04-05 DIAGNOSIS — Z85.3 HX OF BREAST CANCER: ICD-10-CM

## 2022-04-05 DIAGNOSIS — G47.00 INSOMNIA, UNSPECIFIED TYPE: ICD-10-CM

## 2022-04-05 DIAGNOSIS — I10 HYPERTENSION, ESSENTIAL: Primary | ICD-10-CM

## 2022-04-05 DIAGNOSIS — F32.A DEPRESSION, UNSPECIFIED DEPRESSION TYPE: ICD-10-CM

## 2022-04-05 PROCEDURE — 99214 PR OFFICE/OUTPT VISIT, EST, LEVL IV, 30-39 MIN: ICD-10-PCS | Mod: S$GLB,,, | Performed by: FAMILY MEDICINE

## 2022-04-05 PROCEDURE — 3079F DIAST BP 80-89 MM HG: CPT | Mod: CPTII,S$GLB,, | Performed by: FAMILY MEDICINE

## 2022-04-05 PROCEDURE — 3008F PR BODY MASS INDEX (BMI) DOCUMENTED: ICD-10-PCS | Mod: CPTII,S$GLB,, | Performed by: FAMILY MEDICINE

## 2022-04-05 PROCEDURE — 4010F PR ACE/ARB THEARPY RXD/TAKEN: ICD-10-PCS | Mod: CPTII,S$GLB,, | Performed by: FAMILY MEDICINE

## 2022-04-05 PROCEDURE — 1159F PR MEDICATION LIST DOCUMENTED IN MEDICAL RECORD: ICD-10-PCS | Mod: CPTII,S$GLB,, | Performed by: FAMILY MEDICINE

## 2022-04-05 PROCEDURE — 3074F PR MOST RECENT SYSTOLIC BLOOD PRESSURE < 130 MM HG: ICD-10-PCS | Mod: CPTII,S$GLB,, | Performed by: FAMILY MEDICINE

## 2022-04-05 PROCEDURE — 1160F PR REVIEW ALL MEDS BY PRESCRIBER/CLIN PHARMACIST DOCUMENTED: ICD-10-PCS | Mod: CPTII,S$GLB,, | Performed by: FAMILY MEDICINE

## 2022-04-05 PROCEDURE — 3008F BODY MASS INDEX DOCD: CPT | Mod: CPTII,S$GLB,, | Performed by: FAMILY MEDICINE

## 2022-04-05 PROCEDURE — 99214 OFFICE O/P EST MOD 30 MIN: CPT | Mod: S$GLB,,, | Performed by: FAMILY MEDICINE

## 2022-04-05 PROCEDURE — 1159F MED LIST DOCD IN RCRD: CPT | Mod: CPTII,S$GLB,, | Performed by: FAMILY MEDICINE

## 2022-04-05 PROCEDURE — 1160F RVW MEDS BY RX/DR IN RCRD: CPT | Mod: CPTII,S$GLB,, | Performed by: FAMILY MEDICINE

## 2022-04-05 PROCEDURE — 3074F SYST BP LT 130 MM HG: CPT | Mod: CPTII,S$GLB,, | Performed by: FAMILY MEDICINE

## 2022-04-05 PROCEDURE — 4010F ACE/ARB THERAPY RXD/TAKEN: CPT | Mod: CPTII,S$GLB,, | Performed by: FAMILY MEDICINE

## 2022-04-05 PROCEDURE — 3079F PR MOST RECENT DIASTOLIC BLOOD PRESSURE 80-89 MM HG: ICD-10-PCS | Mod: CPTII,S$GLB,, | Performed by: FAMILY MEDICINE

## 2022-04-05 RX ORDER — IRBESARTAN 300 MG/1
300 TABLET ORAL NIGHTLY
Qty: 90 TABLET | Refills: 1 | Status: SHIPPED | OUTPATIENT
Start: 2022-04-05 | End: 2022-08-11

## 2022-04-05 RX ORDER — ROSUVASTATIN CALCIUM 20 MG/1
20 TABLET, COATED ORAL DAILY
Qty: 90 TABLET | Refills: 1 | Status: SHIPPED | OUTPATIENT
Start: 2022-04-05 | End: 2023-01-17 | Stop reason: SDUPTHER

## 2022-04-05 RX ORDER — TRAZODONE HYDROCHLORIDE 50 MG/1
50 TABLET ORAL NIGHTLY
Qty: 90 TABLET | Refills: 1 | Status: SHIPPED | OUTPATIENT
Start: 2022-04-05 | End: 2022-12-09

## 2022-04-05 RX ORDER — POTASSIUM CHLORIDE 20 MEQ/1
20 TABLET, EXTENDED RELEASE ORAL DAILY
Qty: 90 TABLET | Refills: 1 | Status: SHIPPED | OUTPATIENT
Start: 2022-04-05 | End: 2022-08-11

## 2022-04-05 NOTE — TELEPHONE ENCOUNTER
Care Due:                  Date            Visit Type   Department     Provider  --------------------------------------------------------------------------------                                EP -                              PRIMARY      [facility]  Last Visit: 01-      CARE ([facility])   [facility]  Radha                              EP -                              PRIMARY      [facility]  Next Visit: 04-      [facility] ([facility])   [facility]  Radha                                                            Last  Test          Frequency    Reason                     Performed    Due Date  --------------------------------------------------------------------------------    CMP.........  12 months..  DULoxetine,                09- 09-                             hydroCHLOROthiazide,                             irbesartan, potassium,                             rosuvastatin.............    Lipid Panel.  12 months..  rosuvastatin.............  09- 09-    Powered by [employer] by [employer]. Reference number: [id number].   4/04/2022 9:09:14 PM CDT

## 2022-04-06 RX ORDER — ROSUVASTATIN CALCIUM 20 MG/1
TABLET, COATED ORAL
Qty: 90 TABLET | Refills: 0 | OUTPATIENT
Start: 2022-04-06

## 2022-04-06 RX ORDER — POTASSIUM CHLORIDE 20 MEQ/1
TABLET, EXTENDED RELEASE ORAL
Qty: 90 TABLET | Refills: 0 | OUTPATIENT
Start: 2022-04-06

## 2022-04-06 RX ORDER — IRBESARTAN 300 MG/1
TABLET ORAL
Qty: 90 TABLET | Refills: 0 | OUTPATIENT
Start: 2022-04-06

## 2022-04-12 ENCOUNTER — TELEPHONE (OUTPATIENT)
Dept: FAMILY MEDICINE | Facility: CLINIC | Age: 52
End: 2022-04-12
Payer: MEDICAID

## 2022-04-12 NOTE — PROGRESS NOTES
Subjective:       Patient ID: Mirtha Stewart is a 52 y.o. female.    Chief Complaint: Pre-op Exam    HPI   Here for a pre op exam  Vss; bp is well controlled  Denies chest pain  Denies sob  Denies chills  Denies fever  Has arthralgia of right knee- a right knee replacement is needed- [facility]  4/26/2022  CV okay  pulm - okay  ENT_ okay  GI- okay  - okay  Heme- okay  Hyperlipidemia- depression- nonsuicidal; insomnia  Breast cancer screening- needed  Has family hx of breast cancer  thalessima hx  Review of Systems   Musculoskeletal: Positive for arthralgias.       Objective:      Physical Exam  Vitals and nursing note reviewed.   Constitutional:       Appearance: Normal appearance. She is obese.   HENT:      Head: Normocephalic and atraumatic.   Eyes:      Pupils: Pupils are equal, round, and reactive to light.   Cardiovascular:      Rate and Rhythm: Normal rate and regular rhythm.      Pulses: Normal pulses.      Heart sounds: Normal heart sounds.   Pulmonary:      Effort: Pulmonary effort is normal.      Breath sounds: Normal breath sounds.   Musculoskeletal:      Cervical back: Normal range of motion.   Skin:     General: Skin is warm and dry.   Neurological:      General: No focal deficit present.      Mental Status: She is alert and oriented to person, place, and time. Mental status is at baseline.   Psychiatric:         Mood and Affect: Mood normal.         Behavior: Behavior normal.         Thought Content: Thought content normal.         Judgment: Judgment normal.      Comments: nonsuicidal         Assessment:       1. Hypertension, essential    2. Hyperlipidemia, unspecified hyperlipidemia type    3. Depression, unspecified depression type    4. Insomnia, unspecified type    5. Hx of breast cancer    6. Thalassemia, unspecified type    7. Arthralgia of right knee        Plan:       Hypertension, essential    Hyperlipidemia, unspecified hyperlipidemia type    Depression, unspecified depression  type    Insomnia, unspecified type    Hx of breast cancer    Thalassemia, unspecified type    Arthralgia of right knee    Other orders  -     traZODone (DESYREL) 50 MG tablet; Take 1 tablet (50 mg total) by mouth every evening.  Dispense: 90 tablet; Refill: 1  -     rosuvastatin (CRESTOR) 20 MG tablet; Take 1 tablet (20 mg total) by mouth once daily.  Dispense: 90 tablet; Refill: 1  -     potassium chloride SA (K-DUR,KLOR-CON) 20 MEQ tablet; Take 1 tablet (20 mEq total) by mouth once daily.  Dispense: 90 tablet; Refill: 1  -     irbesartan (AVAPRO) 300 MG tablet; Take 1 tablet (300 mg total) by mouth every evening.  Dispense: 90 tablet; Refill: 1      Take medications as ordered  Get colonoscopy  Okay for surgery- is a mod ris

## 2022-05-02 DIAGNOSIS — M79.661 PAIN OF RIGHT LOWER LEG: Primary | ICD-10-CM

## 2022-05-24 RX ORDER — MONTELUKAST SODIUM 10 MG/1
TABLET ORAL
Qty: 90 TABLET | Refills: 3 | Status: SHIPPED | OUTPATIENT
Start: 2022-05-24 | End: 2023-08-18 | Stop reason: SDUPTHER

## 2022-05-24 NOTE — TELEPHONE ENCOUNTER
Refill Authorization Note   Mirtha Stewart  is requesting a refill authorization.  Brief Assessment and Rationale for Refill:  Approve     Medication Therapy Plan:       Medication Reconciliation Completed: No   Comments:     No Care Gaps recommended.     Note composed:4:10 PM 05/24/2022

## 2022-05-24 NOTE — TELEPHONE ENCOUNTER
No new care gaps identified.  [facility] Embedded Care Gaps. Reference number: [id number]. 5/24/2022   2:49:44 AM CDT

## 2022-06-24 DIAGNOSIS — Z12.11 COLON CANCER SCREENING: ICD-10-CM

## 2022-07-28 DIAGNOSIS — Z12.11 COLON CANCER SCREENING: ICD-10-CM

## 2022-08-01 ENCOUNTER — PATIENT MESSAGE (OUTPATIENT)
Dept: FAMILY MEDICINE | Facility: CLINIC | Age: 52
End: 2022-08-01
Payer: MEDICAID

## 2022-08-01 DIAGNOSIS — L72.9 CYST OF SKIN: Primary | ICD-10-CM

## 2022-08-03 ENCOUNTER — OFFICE VISIT (OUTPATIENT)
Dept: FAMILY MEDICINE | Facility: CLINIC | Age: 52
End: 2022-08-03
Payer: MEDICAID

## 2022-08-03 ENCOUNTER — TELEPHONE (OUTPATIENT)
Dept: FAMILY MEDICINE | Facility: CLINIC | Age: 52
End: 2022-08-03

## 2022-08-03 VITALS
HEIGHT: 60 IN | HEART RATE: 75 BPM | OXYGEN SATURATION: 98 % | BODY MASS INDEX: 34.46 KG/M2 | SYSTOLIC BLOOD PRESSURE: 124 MMHG | DIASTOLIC BLOOD PRESSURE: 72 MMHG | TEMPERATURE: 97 F | WEIGHT: 175.5 LBS | RESPIRATION RATE: 18 BRPM

## 2022-08-03 DIAGNOSIS — L02.212 BACK ABSCESS: Primary | ICD-10-CM

## 2022-08-03 PROCEDURE — 3008F BODY MASS INDEX DOCD: CPT | Mod: CPTII,S$GLB,, | Performed by: NURSE PRACTITIONER

## 2022-08-03 PROCEDURE — 4010F ACE/ARB THERAPY RXD/TAKEN: CPT | Mod: CPTII,S$GLB,, | Performed by: NURSE PRACTITIONER

## 2022-08-03 PROCEDURE — 1159F MED LIST DOCD IN RCRD: CPT | Mod: CPTII,S$GLB,, | Performed by: NURSE PRACTITIONER

## 2022-08-03 PROCEDURE — 3008F PR BODY MASS INDEX (BMI) DOCUMENTED: ICD-10-PCS | Mod: CPTII,S$GLB,, | Performed by: NURSE PRACTITIONER

## 2022-08-03 PROCEDURE — 1159F PR MEDICATION LIST DOCUMENTED IN MEDICAL RECORD: ICD-10-PCS | Mod: CPTII,S$GLB,, | Performed by: NURSE PRACTITIONER

## 2022-08-03 PROCEDURE — 4010F PR ACE/ARB THEARPY RXD/TAKEN: ICD-10-PCS | Mod: CPTII,S$GLB,, | Performed by: NURSE PRACTITIONER

## 2022-08-03 PROCEDURE — 99213 OFFICE O/P EST LOW 20 MIN: CPT | Mod: 25,S$GLB,, | Performed by: NURSE PRACTITIONER

## 2022-08-03 PROCEDURE — 99213 PR OFFICE/OUTPT VISIT, EST, LEVL III, 20-29 MIN: ICD-10-PCS | Mod: 25,S$GLB,, | Performed by: NURSE PRACTITIONER

## 2022-08-03 PROCEDURE — 10060 PR DRAIN SKIN ABSCESS SIMPLE: ICD-10-PCS | Mod: S$GLB,,, | Performed by: NURSE PRACTITIONER

## 2022-08-03 PROCEDURE — 10060 I&D ABSCESS SIMPLE/SINGLE: CPT | Mod: S$GLB,,, | Performed by: NURSE PRACTITIONER

## 2022-08-03 RX ORDER — MUPIROCIN 20 MG/G
OINTMENT TOPICAL 3 TIMES DAILY
Qty: 2 G | Refills: 1 | Status: SHIPPED | OUTPATIENT
Start: 2022-08-03 | End: 2022-08-10 | Stop reason: SDUPTHER

## 2022-08-03 RX ORDER — DOXYCYCLINE 100 MG/1
100 CAPSULE ORAL EVERY 12 HOURS
Qty: 20 CAPSULE | Refills: 0 | Status: SHIPPED | OUTPATIENT
Start: 2022-08-03 | End: 2023-01-18

## 2022-08-03 RX ORDER — PANTOPRAZOLE SODIUM 20 MG/1
40 TABLET, DELAYED RELEASE ORAL DAILY
COMMUNITY
Start: 2022-05-24

## 2022-08-03 NOTE — TELEPHONE ENCOUNTER
Dermatology referral: [facility] or around [facility] are only available. Phone and portal message with this information and suggestion that patient contact insurance for in-network providers.

## 2022-08-03 NOTE — TELEPHONE ENCOUNTER
1120 cyst on back with ashish mariee.     ----- Message from Enedelia Rodriguez MA sent at 8/3/2022  8:09 AM CDT -----  Contact: HALINA ROUSE [[id number]]  Type: Needs Medical Advice    Who Called: HALINA ROUSE [[id number]]  Best Call Back Number: [phone]  Inquiry/Question: Please call HALINA ROUSE [[id number]] regarding cyst on back and infection   '   Thank you~

## 2022-08-03 NOTE — PROGRESS NOTES
SUBJECTIVE:      Patient ID: Mirtha Stewart is a 52 y.o. female.    Chief Complaint: Cyst (On back)    HPI  Pt is here for cyst on back  Denies chest pain  Denies sob  Denies fever  Denies chills    Right upper to mid back abscess noted; size of silver dollar  Is red and inflammed and painful  Will do an incision and drainage  Cleaned and prepped with alcohol  sterile technique  use lido w epi 1.6 ml   use scapel and do a vertical incision 1 and half inches  Pressure applied  Packing inserted  Send for culture    Pt sage proc well  Post care instructions given- showers only no baths no swim        Past Surgical History:   Procedure Laterality Date     SECTION      hysterctomy      KNEE SURGERY      *3     Family History   Problem Relation Age of Onset    Breast cancer Mother       Social History     Socioeconomic History    Marital status:    Tobacco Use    Smoking status: Never Smoker    Smokeless tobacco: Never Used   Substance and Sexual Activity    Alcohol use: Yes     Comment: OCC    Drug use: Not Currently    Sexual activity: Not Currently     Current Outpatient Medications   Medication Sig Dispense Refill    cyclobenzaprine (FLEXERIL) 10 MG tablet Take 10 mg by mouth nightly.      hydroCHLOROthiazide (HYDRODIURIL) 25 MG tablet Take 1 tablet by mouth once daily 90 tablet 0    irbesartan (AVAPRO) 300 MG tablet Take 1 tablet (300 mg total) by mouth every evening. 90 tablet 1    lidocaine (LIDODERM) 5 % APPLY 1 PATCH TOPICALLY ONCE DAILY (MAY WEAR UP TO 12HOURS.)      meloxicam (MOBIC) 15 MG tablet Take 15 mg by mouth once daily.      montelukast (SINGULAIR) 10 mg tablet TAKE 1 TABLET BY MOUTH ONCE DAILY IN THE EVENING 90 tablet 3    pantoprazole (PROTONIX) 20 MG tablet Take 40 mg by mouth once daily.      potassium chloride SA (K-DUR,KLOR-CON) 20 MEQ tablet Take 1 tablet (20 mEq total) by mouth once daily. 90 tablet 1    rosuvastatin (CRESTOR) 20 MG tablet Take 1 tablet  (20 mg total) by mouth once daily. 90 tablet 1    traZODone (DESYREL) 50 MG tablet Take 1 tablet (50 mg total) by mouth every evening. 90 tablet 1    diclofenac (FLECTOR) 1.3 % PT12 APPLY 1 PATCH TOPICALLY ONCE DAILY (MAY WEAR UP TO 12HOURS.)      doxycycline (VIBRAMYCIN) 100 MG Cap Take 1 capsule (100 mg total) by mouth every 12 (twelve) hours. 20 capsule 0    mupirocin (BACTROBAN) 2 % ointment Apply topically 3 (three) times daily. 2 g 1     No current facility-administered medications for this visit.     Review of patient's allergies indicates:   Allergen Reactions    Lexapro [escitalopram] Nausea Only     Dizziness, humming in ears, shakes      Past Medical History:   Diagnosis Date    PUD (peptic ulcer disease)     teenager     Past Surgical History:   Procedure Laterality Date     SECTION      hysterctomy      KNEE SURGERY      *3       Review of Systems   Skin: Positive for wound.      OBJECTIVE:      Vitals:    22 1131   BP: 124/72   BP Location: Left arm   Patient Position: Sitting   BP Method: Large (Manual)   Pulse: 75   Resp: 18   Temp: 97 °F (36.1 °C)   SpO2: 98%   Weight: 79.6 kg (175 lb 8 oz)   Height: 5' (1.524 m)     Physical Exam  Vitals and nursing note reviewed.   Constitutional:       Appearance: Normal appearance. She is obese.   HENT:      Head: Normocephalic and atraumatic.   Eyes:      Pupils: Pupils are equal, round, and reactive to light.   Cardiovascular:      Rate and Rhythm: Normal rate and regular rhythm.      Pulses: Normal pulses.      Heart sounds: Normal heart sounds.   Pulmonary:      Effort: Pulmonary effort is normal.      Breath sounds: Normal breath sounds.   Musculoskeletal:      Cervical back: Normal range of motion.   Skin:     Comments: Abscess size of silver dollar pre incision and drain noted on right upper mid back- red and tender    After incision and drain- vertical incision done   Flattened    Neurological:      General: No focal deficit  present.      Mental Status: She is alert and oriented to person, place, and time. Mental status is at baseline.   Psychiatric:         Mood and Affect: Mood normal.         Behavior: Behavior normal.         Thought Content: Thought content normal.         Judgment: Judgment normal.      Comments: nonsuicidal        Assessment:       1. Back abscess        Plan:       Back abscess  -     doxycycline (VIBRAMYCIN) 100 MG Cap; Take 1 capsule (100 mg total) by mouth every 12 (twelve) hours.  Dispense: 20 capsule; Refill: 0  -     mupirocin (BACTROBAN) 2 % ointment; Apply topically 3 (three) times daily.  Dispense: 2 g; Refill: 1  -     INCISION AND DRAINAGE; Future      Follow post care instructions  Take medications as ordered  Follow up in 1 week    Follow up in about 1 week (around 8/10/2022).      8/3/2022 NARA Hillman, FNP

## 2022-08-08 ENCOUNTER — PATIENT MESSAGE (OUTPATIENT)
Dept: FAMILY MEDICINE | Facility: CLINIC | Age: 52
End: 2022-08-08
Payer: MEDICAID

## 2022-08-08 LAB — BACTERIA SPEC AEROBE CULT: NORMAL

## 2022-08-10 ENCOUNTER — OFFICE VISIT (OUTPATIENT)
Dept: FAMILY MEDICINE | Facility: CLINIC | Age: 52
End: 2022-08-10
Payer: MEDICAID

## 2022-08-10 VITALS
SYSTOLIC BLOOD PRESSURE: 108 MMHG | DIASTOLIC BLOOD PRESSURE: 82 MMHG | BODY MASS INDEX: 34.75 KG/M2 | RESPIRATION RATE: 18 BRPM | TEMPERATURE: 98 F | WEIGHT: 177 LBS | OXYGEN SATURATION: 97 % | HEIGHT: 60 IN | HEART RATE: 78 BPM

## 2022-08-10 DIAGNOSIS — L02.212 BACK ABSCESS: ICD-10-CM

## 2022-08-10 DIAGNOSIS — Z51.89 WOUND CHECK, ABSCESS: Primary | ICD-10-CM

## 2022-08-10 PROCEDURE — 3079F DIAST BP 80-89 MM HG: CPT | Mod: CPTII,S$GLB,, | Performed by: NURSE PRACTITIONER

## 2022-08-10 PROCEDURE — 3079F PR MOST RECENT DIASTOLIC BLOOD PRESSURE 80-89 MM HG: ICD-10-PCS | Mod: CPTII,S$GLB,, | Performed by: NURSE PRACTITIONER

## 2022-08-10 PROCEDURE — 99024 PR POST-OP FOLLOW-UP VISIT: ICD-10-PCS | Mod: S$GLB,,, | Performed by: NURSE PRACTITIONER

## 2022-08-10 PROCEDURE — 1159F MED LIST DOCD IN RCRD: CPT | Mod: CPTII,S$GLB,, | Performed by: NURSE PRACTITIONER

## 2022-08-10 PROCEDURE — 3008F BODY MASS INDEX DOCD: CPT | Mod: CPTII,S$GLB,, | Performed by: NURSE PRACTITIONER

## 2022-08-10 PROCEDURE — 3008F PR BODY MASS INDEX (BMI) DOCUMENTED: ICD-10-PCS | Mod: CPTII,S$GLB,, | Performed by: NURSE PRACTITIONER

## 2022-08-10 PROCEDURE — 3074F SYST BP LT 130 MM HG: CPT | Mod: CPTII,S$GLB,, | Performed by: NURSE PRACTITIONER

## 2022-08-10 PROCEDURE — 1159F PR MEDICATION LIST DOCUMENTED IN MEDICAL RECORD: ICD-10-PCS | Mod: CPTII,S$GLB,, | Performed by: NURSE PRACTITIONER

## 2022-08-10 PROCEDURE — 99024 POSTOP FOLLOW-UP VISIT: CPT | Mod: S$GLB,,, | Performed by: NURSE PRACTITIONER

## 2022-08-10 PROCEDURE — 4010F PR ACE/ARB THEARPY RXD/TAKEN: ICD-10-PCS | Mod: CPTII,S$GLB,, | Performed by: NURSE PRACTITIONER

## 2022-08-10 PROCEDURE — 3074F PR MOST RECENT SYSTOLIC BLOOD PRESSURE < 130 MM HG: ICD-10-PCS | Mod: CPTII,S$GLB,, | Performed by: NURSE PRACTITIONER

## 2022-08-10 PROCEDURE — 4010F ACE/ARB THERAPY RXD/TAKEN: CPT | Mod: CPTII,S$GLB,, | Performed by: NURSE PRACTITIONER

## 2022-08-10 RX ORDER — MUPIROCIN 20 MG/G
OINTMENT TOPICAL 3 TIMES DAILY
Qty: 2 G | Refills: 1 | Status: SHIPPED | OUTPATIENT
Start: 2022-08-10 | End: 2023-01-18

## 2022-08-10 NOTE — PROGRESS NOTES
SUBJECTIVE:      Patient ID: Mirtha Stewart is a 52 y.o. female.    Chief Complaint: Follow-up (1 week)    HPI   Pt is here for check up after an incision and drainage last week on right upper mid back  Took old packing out and replaced with new iodiform just less of it  Wound is open but is clean   No redness no soreness noted  No heat noted  Pt states is feeling much better since last week  Has been placing mupirocin oint on wound  Placed neosporin today with bandadge    Past Surgical History:   Procedure Laterality Date     SECTION      hysterctomy      KNEE SURGERY      *3     Family History   Problem Relation Age of Onset    Breast cancer Mother       Social History     Socioeconomic History    Marital status:    Tobacco Use    Smoking status: Never Smoker    Smokeless tobacco: Never Used   Substance and Sexual Activity    Alcohol use: Yes     Comment: OCC    Drug use: Not Currently    Sexual activity: Not Currently     Current Outpatient Medications   Medication Sig Dispense Refill    cyclobenzaprine (FLEXERIL) 10 MG tablet Take 10 mg by mouth nightly.      diclofenac (FLECTOR) 1.3 % PT12 APPLY 1 PATCH TOPICALLY ONCE DAILY (MAY WEAR UP TO 12HOURS.)      doxycycline (VIBRAMYCIN) 100 MG Cap Take 1 capsule (100 mg total) by mouth every 12 (twelve) hours. 20 capsule 0    hydroCHLOROthiazide (HYDRODIURIL) 25 MG tablet Take 1 tablet by mouth once daily 90 tablet 0    irbesartan (AVAPRO) 300 MG tablet Take 1 tablet (300 mg total) by mouth every evening. 90 tablet 1    lidocaine (LIDODERM) 5 % APPLY 1 PATCH TOPICALLY ONCE DAILY (MAY WEAR UP TO 12HOURS.)      meloxicam (MOBIC) 15 MG tablet Take 15 mg by mouth once daily.      montelukast (SINGULAIR) 10 mg tablet TAKE 1 TABLET BY MOUTH ONCE DAILY IN THE EVENING 90 tablet 3    mupirocin (BACTROBAN) 2 % ointment Apply topically 3 (three) times daily. 2 g 1    pantoprazole (PROTONIX) 20 MG tablet Take 40 mg by mouth once daily.       potassium chloride SA (K-DUR,KLOR-CON) 20 MEQ tablet Take 1 tablet (20 mEq total) by mouth once daily. 90 tablet 1    rosuvastatin (CRESTOR) 20 MG tablet Take 1 tablet (20 mg total) by mouth once daily. 90 tablet 1    traZODone (DESYREL) 50 MG tablet Take 1 tablet (50 mg total) by mouth every evening. 90 tablet 1     No current facility-administered medications for this visit.     Review of patient's allergies indicates:   Allergen Reactions    Lexapro [escitalopram] Nausea Only     Dizziness, humming in ears, shakes      Past Medical History:   Diagnosis Date    PUD (peptic ulcer disease)     teenager     Past Surgical History:   Procedure Laterality Date     SECTION      hysterctomy      KNEE SURGERY      *3       Review of Systems   Skin: Positive for wound.   All other systems reviewed and are negative.     OBJECTIVE:      Vitals:    08/10/22 1254   BP: 108/82   BP Location: Left arm   Patient Position: Sitting   BP Method: Large (Manual)   Pulse: 78   Resp: 18   Temp: 97.7 °F (36.5 °C)   SpO2: 97%   Weight: 80.3 kg (177 lb)   Height: 5' (1.524 m)     Physical Exam  Vitals and nursing note reviewed.   Constitutional:       Appearance: Normal appearance. She is obese.   HENT:      Head: Normocephalic and atraumatic.   Eyes:      Pupils: Pupils are equal, round, and reactive to light.   Musculoskeletal:      Cervical back: Normal range of motion.   Skin:     Comments: Wound from incision and drainage last week of an abscess on right upper mid back  Is open but is clean  No heat  No soreness  No redness seen   Neurological:      General: No focal deficit present.      Mental Status: She is alert and oriented to person, place, and time. Mental status is at baseline.   Psychiatric:         Mood and Affect: Mood normal.         Behavior: Behavior normal.         Thought Content: Thought content normal.         Judgment: Judgment normal.      Comments: nonsuicidal        Assessment:       1. Wound  check, abscess    2. Back abscess        Plan:       Wound check, abscess    Back abscess  -     mupirocin (BACTROBAN) 2 % ointment; Apply topically 3 (three) times daily.  Dispense: 2 g; Refill: 1      Continue the doxycylcine until finished  Continue the mupirocin tid  Follow up again in one week or sooner if needed  Showers no baths no swim  Keep clean    No follow-ups on file.      8/10/2022 Betty Huizar, NARA, FNP

## 2022-08-12 ENCOUNTER — PATIENT MESSAGE (OUTPATIENT)
Dept: FAMILY MEDICINE | Facility: CLINIC | Age: 52
End: 2022-08-12
Payer: MEDICAID

## 2022-08-15 ENCOUNTER — TELEPHONE (OUTPATIENT)
Dept: FAMILY MEDICINE | Facility: CLINIC | Age: 52
End: 2022-08-15
Payer: MEDICAID

## 2022-08-15 NOTE — TELEPHONE ENCOUNTER
Returned call to pt , since dr coon is out of country alicia np and mike np not here either to see pts so since she has packing in an open incision in her back where I and d was done go to [facility] so they can eval and re pack , then pt anum vargas when they are back.pt to call.

## 2022-08-17 DIAGNOSIS — I10 HYPERTENSION, ESSENTIAL: ICD-10-CM

## 2022-08-18 ENCOUNTER — OFFICE VISIT (OUTPATIENT)
Dept: URGENT CARE | Facility: CLINIC | Age: 52
End: 2022-08-18
Payer: MEDICAID

## 2022-08-18 VITALS
HEART RATE: 79 BPM | WEIGHT: 178.63 LBS | TEMPERATURE: 98 F | OXYGEN SATURATION: 99 % | BODY MASS INDEX: 35.07 KG/M2 | HEIGHT: 60 IN | DIASTOLIC BLOOD PRESSURE: 76 MMHG | SYSTOLIC BLOOD PRESSURE: 117 MMHG | RESPIRATION RATE: 17 BRPM

## 2022-08-18 DIAGNOSIS — S21.201A OPEN WOUND OF RIGHT SIDE OF BACK, INITIAL ENCOUNTER: ICD-10-CM

## 2022-08-18 DIAGNOSIS — L02.212 ABSCESS OF BACK: Primary | ICD-10-CM

## 2022-08-18 PROCEDURE — 3074F PR MOST RECENT SYSTOLIC BLOOD PRESSURE < 130 MM HG: ICD-10-PCS | Mod: CPTII,S$GLB,, | Performed by: NURSE PRACTITIONER

## 2022-08-18 PROCEDURE — 99203 PR OFFICE/OUTPT VISIT, NEW, LEVL III, 30-44 MIN: ICD-10-PCS | Mod: S$GLB,,, | Performed by: NURSE PRACTITIONER

## 2022-08-18 PROCEDURE — 3074F SYST BP LT 130 MM HG: CPT | Mod: CPTII,S$GLB,, | Performed by: NURSE PRACTITIONER

## 2022-08-18 PROCEDURE — 4010F ACE/ARB THERAPY RXD/TAKEN: CPT | Mod: CPTII,S$GLB,, | Performed by: NURSE PRACTITIONER

## 2022-08-18 PROCEDURE — 3078F PR MOST RECENT DIASTOLIC BLOOD PRESSURE < 80 MM HG: ICD-10-PCS | Mod: CPTII,S$GLB,, | Performed by: NURSE PRACTITIONER

## 2022-08-18 PROCEDURE — 1159F PR MEDICATION LIST DOCUMENTED IN MEDICAL RECORD: ICD-10-PCS | Mod: CPTII,S$GLB,, | Performed by: NURSE PRACTITIONER

## 2022-08-18 PROCEDURE — 99203 OFFICE O/P NEW LOW 30 MIN: CPT | Mod: S$GLB,,, | Performed by: NURSE PRACTITIONER

## 2022-08-18 PROCEDURE — 3078F DIAST BP <80 MM HG: CPT | Mod: CPTII,S$GLB,, | Performed by: NURSE PRACTITIONER

## 2022-08-18 PROCEDURE — 3008F BODY MASS INDEX DOCD: CPT | Mod: CPTII,S$GLB,, | Performed by: NURSE PRACTITIONER

## 2022-08-18 PROCEDURE — 1160F PR REVIEW ALL MEDS BY PRESCRIBER/CLIN PHARMACIST DOCUMENTED: ICD-10-PCS | Mod: CPTII,S$GLB,, | Performed by: NURSE PRACTITIONER

## 2022-08-18 PROCEDURE — 3008F PR BODY MASS INDEX (BMI) DOCUMENTED: ICD-10-PCS | Mod: CPTII,S$GLB,, | Performed by: NURSE PRACTITIONER

## 2022-08-18 PROCEDURE — 1159F MED LIST DOCD IN RCRD: CPT | Mod: CPTII,S$GLB,, | Performed by: NURSE PRACTITIONER

## 2022-08-18 PROCEDURE — 4010F PR ACE/ARB THEARPY RXD/TAKEN: ICD-10-PCS | Mod: CPTII,S$GLB,, | Performed by: NURSE PRACTITIONER

## 2022-08-18 PROCEDURE — 1160F RVW MEDS BY RX/DR IN RCRD: CPT | Mod: CPTII,S$GLB,, | Performed by: NURSE PRACTITIONER

## 2022-08-18 NOTE — PROGRESS NOTES
"Subjective:       Patient ID: Mirtha Stewart is a 52 y.o. female.    Vitals:  height is 5' (1.524 m) and weight is 81 kg (178 lb 9.6 oz). Her oral temperature is 97.9 °F (36.6 °C). Her blood pressure is 117/76 and her pulse is 79. Her respiration is 17 and oxygen saturation is 99%.     Chief Complaint: Wound Check    Pt here to have packing removed, and changed     Wound Check  She was originally treated 10 to 14 days ago (yesterday made 2 weeks ). Previous treatment included I&D of abscess. There has been bloody (a little blood ) discharge from the wound. The redness has improved. The swelling has improved. There is no pain present. She has no difficulty moving ("just cant lean on my back") the affected extremity or digit.       Constitution: Negative for chills and fever.   Skin: Positive for wound and abscess (healing). Negative for erythema.   Neurological: Negative for dizziness, light-headedness, passing out, disorientation and altered mental status.   Psychiatric/Behavioral: Negative for altered mental status, disorientation and confusion.       Objective:      Physical Exam   Constitutional: She is oriented to person, place, and time. She appears well-developed. She is cooperative.  Non-toxic appearance. She does not appear ill. No distress.   HENT:   Head: Normocephalic and atraumatic.   Ears:   Right Ear: External ear normal.   Left Ear: External ear normal.   Nose: Nose normal.   Mouth/Throat: Oropharynx is clear and moist and mucous membranes are normal. Mucous membranes are moist. Oropharynx is clear.   Eyes: Conjunctivae and lids are normal. No scleral icterus.   Neck: Trachea normal and phonation normal. Neck supple.   Cardiovascular: Normal rate, regular rhythm, normal heart sounds and normal pulses.   Pulmonary/Chest: Effort normal and breath sounds normal.   Abdominal: Normal appearance. She exhibits no abdominal bruit and no pulsatile midline mass.   Musculoskeletal:         General: No " deformity.        Back:    Neurological: She is alert and oriented to person, place, and time. She has normal strength and normal reflexes. No sensory deficit.   Skin: Skin is warm, dry, intact and not diaphoretic. Capillary refill takes 2 to 3 seconds. No erythema   Psychiatric: Her speech is normal and behavior is normal. Judgment and thought content normal.   Nursing note and vitals reviewed.        Assessment:       1. Abscess of back    2. Open wound of right side of back, initial encounter          Plan:         Abscess of back    Open wound of right side of back, initial encounter        I have discussed the physical exam findings with the patient.  Approximately 4 in of iodoform packing removed from wound with serosanguineous and bloody drainage.  Dressing change with mupirocin and Telfa dressing applied.  We discussed symptom monitoring, treatment options, medication use, and follow up orders. She verbalized understanding and agreement with the plan of care.        Clean wound with wound cleanser or saline and change dressing daily and as needed.  May apply a new go hunting to wound or mupirocin ointment with dressing changes.  Reapply mupirocin 2-3 times daily if used.  Do not submerge wound and water, or allow shower water to contact wound  Protect wound from trauma, do not allow pressure on wound  Follow-up with wound care referral for continuation of care  Follow-up primary care provider  Return to clinic for any new concerns

## 2022-08-18 NOTE — PATIENT INSTRUCTIONS
Clean wound with wound cleanser or saline and change dressing daily and as needed.  May apply a new go hunting to wound or mupirocin ointment with dressing changes.  Reapply mupirocin 2-3 times daily if used.  Do not submerge wound and water, or allow shower water to contact wound  Protect wound from trauma, do not allow pressure on wound  Follow-up with wound care referral for continuation of care  Follow-up primary care provider  Return to clinic for any new concerns

## 2022-08-22 ENCOUNTER — PATIENT MESSAGE (OUTPATIENT)
Dept: ADMINISTRATIVE | Facility: HOSPITAL | Age: 52
End: 2022-08-22
Payer: MEDICAID

## 2022-10-12 ENCOUNTER — PATIENT MESSAGE (OUTPATIENT)
Dept: FAMILY MEDICINE | Facility: CLINIC | Age: 52
End: 2022-10-12
Payer: MEDICAID

## 2022-11-01 ENCOUNTER — TELEPHONE (OUTPATIENT)
Dept: URGENT CARE | Facility: CLINIC | Age: 52
End: 2022-11-01
Payer: MEDICAID

## 2023-01-17 ENCOUNTER — PATIENT MESSAGE (OUTPATIENT)
Dept: FAMILY MEDICINE | Facility: CLINIC | Age: 53
End: 2023-01-17
Payer: MEDICAID

## 2023-01-17 DIAGNOSIS — E78.5 HYPERLIPIDEMIA, UNSPECIFIED HYPERLIPIDEMIA TYPE: ICD-10-CM

## 2023-01-17 DIAGNOSIS — I10 HYPERTENSION, ESSENTIAL: Primary | ICD-10-CM

## 2023-01-17 RX ORDER — POTASSIUM CHLORIDE 20 MEQ/1
20 TABLET, EXTENDED RELEASE ORAL DAILY
Qty: 90 TABLET | Refills: 0 | Status: SHIPPED | OUTPATIENT
Start: 2023-01-17 | End: 2023-08-18 | Stop reason: SDUPTHER

## 2023-01-17 RX ORDER — ROSUVASTATIN CALCIUM 20 MG/1
20 TABLET, COATED ORAL DAILY
Qty: 90 TABLET | Refills: 1 | Status: SHIPPED | OUTPATIENT
Start: 2023-01-17 | End: 2023-08-18 | Stop reason: SDUPTHER

## 2023-01-17 RX ORDER — HYDROCHLOROTHIAZIDE 25 MG/1
25 TABLET ORAL DAILY
Qty: 90 TABLET | Refills: 0 | Status: SHIPPED | OUTPATIENT
Start: 2023-01-17 | End: 2023-08-18 | Stop reason: SDUPTHER

## 2023-01-17 RX ORDER — IRBESARTAN 300 MG/1
300 TABLET ORAL NIGHTLY
Qty: 90 TABLET | Refills: 0 | Status: SHIPPED | OUTPATIENT
Start: 2023-01-17 | End: 2023-09-14

## 2023-01-17 NOTE — TELEPHONE ENCOUNTER
No new care gaps identified.  [facility] Embedded Care Gaps. Reference number: [id number]. 1/17/2023   2:51:46 PM CST

## 2023-01-18 ENCOUNTER — OFFICE VISIT (OUTPATIENT)
Dept: PSYCHIATRY | Facility: CLINIC | Age: 53
End: 2023-01-18
Payer: OTHER MISCELLANEOUS

## 2023-01-18 ENCOUNTER — PATIENT MESSAGE (OUTPATIENT)
Dept: PSYCHIATRY | Facility: CLINIC | Age: 53
End: 2023-01-18
Payer: MEDICAID

## 2023-01-18 VITALS
WEIGHT: 177.94 LBS | HEART RATE: 71 BPM | BODY MASS INDEX: 34.93 KG/M2 | HEIGHT: 60 IN | SYSTOLIC BLOOD PRESSURE: 114 MMHG | DIASTOLIC BLOOD PRESSURE: 78 MMHG

## 2023-01-18 DIAGNOSIS — F33.1 MODERATE EPISODE OF RECURRENT MAJOR DEPRESSIVE DISORDER: Primary | ICD-10-CM

## 2023-01-18 DIAGNOSIS — G47.00 INSOMNIA, UNSPECIFIED TYPE: ICD-10-CM

## 2023-01-18 DIAGNOSIS — R92.8 ABNORMAL MAMMOGRAM: Primary | ICD-10-CM

## 2023-01-18 DIAGNOSIS — F41.1 GENERALIZED ANXIETY DISORDER: ICD-10-CM

## 2023-01-18 PROCEDURE — 99999 PR PBB SHADOW E&M-EST. PATIENT-LVL V: ICD-10-PCS | Mod: PBBFAC,,, | Performed by: STUDENT IN AN ORGANIZED HEALTH CARE EDUCATION/TRAINING PROGRAM

## 2023-01-18 PROCEDURE — 99999 PR PBB SHADOW E&M-EST. PATIENT-LVL V: CPT | Mod: PBBFAC,,, | Performed by: STUDENT IN AN ORGANIZED HEALTH CARE EDUCATION/TRAINING PROGRAM

## 2023-01-18 PROCEDURE — 90792 PR PSYCHIATRIC DIAGNOSTIC EVALUATION W/MEDICAL SERVICES: ICD-10-PCS | Mod: S$GLB,,, | Performed by: STUDENT IN AN ORGANIZED HEALTH CARE EDUCATION/TRAINING PROGRAM

## 2023-01-18 PROCEDURE — 90792 PSYCH DIAG EVAL W/MED SRVCS: CPT | Mod: S$GLB,,, | Performed by: STUDENT IN AN ORGANIZED HEALTH CARE EDUCATION/TRAINING PROGRAM

## 2023-01-18 RX ORDER — VILAZODONE HYDROCHLORIDE 20 MG/1
20 TABLET ORAL DAILY
Qty: 30 TABLET | Refills: 1 | Status: SHIPPED | OUTPATIENT
Start: 2023-01-18 | End: 2023-02-17 | Stop reason: DRUGHIGH

## 2023-01-18 RX ORDER — TRAZODONE HYDROCHLORIDE 50 MG/1
50 TABLET ORAL NIGHTLY
Qty: 30 TABLET | Refills: 1 | Status: SHIPPED | OUTPATIENT
Start: 2023-01-18 | End: 2023-03-20 | Stop reason: DRUGHIGH

## 2023-01-18 NOTE — PATIENT INSTRUCTIONS
Don't go to bed unless you're drowsy.  If you're awake in bed for longer than 30mins, leave the bedroom, and don't return until you're drowsy.  Set an alarm every morning, including on weekends, and don't snooze.    Switch TRAZODONE 50mg HS PRN insomnia to scheduled dosing at night  Start VIIBRYD 10mg daily for one week, then increase to 20mg daily    Please complete lab work at your earliest convenience. Your labs are NON-FASTING.  Referral to psychotherapy.

## 2023-01-18 NOTE — PROGRESS NOTES
"Outpatient Psychiatry Initial Visit (DO/MD/NP)    1/18/2023    Mirtha Stewart, a 52 y.o. female, presenting for initial evaluation visit. Met with patient.    Reason for Encounter:     Referral from:    Suraj Carlson MD  [id number] [place] [address]    Patient complains of Depression and Sleeping Problem    History of Present Illness (HPI):     Pt is pleasant and cooperative on interview. This visit was done in person in the clinic.    Pt reports, "my whole life has been taken away from me." About 5.5 ya pt hurt her knee at work as an . Since the knee injury she has needed 3 surgeries and has been in chronic pain. Prior to the injury she was very active, competing in bodybuilding competitions and dancing. The transition to this reduced activity level has been difficult. She says the injury "took away the passion." Pt is looking for work (she has a bachelors in exercise phys) and it is difficult to find one which would accommodate for her chronic knee problem.    Pt has financial strain. Pt described how chronic pain is a source of stress.    Mood is depressed and motivation is low. Diminished interest in hobbies noted. Pt endorses symptoms of guilt, hopelessness, and worthlessness. Pt qualified energy as "low". Socialization is intact. Pt exercises 5 days per week.    Pt denies having problems with panic attacks. Anxiety is mild.    Pt reports sleep as overall poor. Sleep onset is taking several hours and pt lies awake thinking or worrying. Duration is 5-6 hours with 4 or 6 interruptions due to pain and spontaneous awakenings. Pt denies naps. Pt reports the following before bed: TRAZODONE. Nightmares have not been a problem.    Pt reports concentration is down.    Pt reports weight gain. Pt engages in episodes of binge eating.    Negative history of panic attacks, hypomania, shell, and psychosis. No obsessions or compulsions. No phobias.    Patient did not display signs of nor endorse " "symptoms of overt psychosis or acute mood disorder requiring hospitalization during the encounter. Patient denied violent thoughts or suicidal or homicidal ideation, intent, or plan.    Suicide risk assessment performed. Pt denies suicidal ideation, intent or plan. Pt has never attempted suicide in the past. Risk factors include chronic pain, depression, and single status. Protective factors include wanting to live for the family and receptivity to treatment. Suicide risk at this time is: LOW. Pt agrees to safety. No safety concerns identified at this time.     Instruments:     Instruments Today (1/18/2023):    GAD7 Interpretation: 10/21 (1/18/2023); MODERATE using 5-10-15 cutoffs. The GAD7 has acceptable properties for identifying HOLLI at cutoff scores 7 to 10; a cutoff score of 10 is fairly sensitive (0.8) but not specific (0.4).  This is a new baseline reading.    MDI Interpretation: 31/50 (1/18/2023); SEVERE DEPRESSION SEVERITY using 21-26-31 cutoffs. This is a new baseline reading.    MDQ Interpretation: NEGATIVE (Scored 0-6, and/or answered "No" to question #2 and/or labeled problem as "minor" or less)    PHQ9 Interpretation: 19/27 (1/18/2023); MODERATE using 7-15-21 cutoffs. There tends to be significant variability in sensitivity of cutoff scores between 7 and 15. The PHQ-9 was found to have acceptable diagnostic properties for screening for MDD for cut-off scores between 8 and 11. PHQ-9 is useful for screening, but not always for diagnosis of a current epsiode of MDD in a psychiatric specialty clinic; according to the literature the optimal cutoff score for a current episode of MDD is most reliably 13 or 14.  This is a new baseline reading.    PSS10 Interpretation: 31/40 (1/18/2023); HIGH using 14-27 cutoffs. (4/6 perceived helplessness) (3/4 self-efficacy deficits) This is a new baseline reading.    Bipolarity Index  1/18/2023   I. Episode Characteristics 2: Recurrent unipolar major depressive disorder (=3 " "episode)   II. Age of Onset (first affective episode or syndrome) 5: After age 45.   III. Course of Illness & Associated Features 5: Recurrent unipolar MDD with =3 or more major depressive episodes   IV. Response to Treatment 5: Treatment resistance: lack of response to complete trials of 3 or more antidepressants   V. Family History 20: At least one first-degree relative with clear bipolar disorder   Total Score 37/100 BI    Results Interpretation: Score total is in the range of 0 to 39; this score is associated with a predisposition to UNIPOLARITY of mood states with a relatively low risk for conversion to bipolar disorder if prescribed an antidepressant. Scores 50 and above have a high sensitivity (0.91) and specificity (0.90) for bipolar disorder.         Review of Systems:     Pertinent items are noted in HPI.    Functioning in Relationships:  Spouse/partner:   Peers: no concerns identified  Employers: N/A    History:     Past Psychiatric History:    Prior Diagnosis(es): anxiety and depression  Inpatient Psychiatric Treatment(s):  None  Outpatient Psychiatric Treatment(s): NO  Prior Psychotherapy: years ago for marriage counseling and individual therapy for 1 year    Psychopharmacological History:  Prior Psychotropic Medication(s): CYMBALTA, EFFEXOR, LEXAPRO, TRAZODONE, TRINTELLIX, and WELLBUTRIN  Current Psychotropic Medication(s): TRAZODONE  Other Current Psychoactive Agent(s): FLEXERIL (deliriogenic, anxiogenic, oneirogenic, depressing, psychotogenic) and MOBIC (anxiogenic, depressing, mood destabilizing, oneirogenic)    Prior Suicide Attempts: NO  Prior History of Self Harm: NO    Prior Psychological Testing: NO    Personal Psychosocial History:    Childhood: Born 1st of 2 children and raised by both parents. Reports overall childhood was "normal". No abuse, no serious illnesses and no injuries reported in childhood  Marital Status:   Children: Yes, two: 19yo and 26yo  Residence: private " residence, lives alone  Occupation: unemployed  Hobbies: dancing, watching TV shows and movies, and working out  Hinduism Practice: raised Lutheran  Education History: Pt has a tertiary formal educational level (completed a bachelor's degree).   History: None.  Legal History: Denied.  Abuse History: Yes - emotional (ex )  Trauma History: Negative  Sexual History: Deferred    Family History:     Family Psychiatric History:  Suicides:  sister attempted  Substance Abuse: Yes - First-degree relative (sister).  Alcohol Abuse: Yes - First-degree relative (father).  Bipolar Disorder: Yes - First-degree relative (sister).  Anxiety: Yes - First-degree relative (daughter).  Depression: Yes - First-degree relative (sister).    Substance History:    Alcohol: Pt drinks occasionally.  Tobacco and Nicotine: No  Caffeine use: coffee 2x/w and pre-workout  Marijuana: NO  Other Recreational Substances: No  Rehab: No    Past Medical History:    Past Medical History:   Diagnosis Date    PUD (peptic ulcer disease)     teenager      Active Problem List with Overview Notes    Diagnosis Date Noted    BMI 34.0-34.9,adult 2023    Generalized anxiety disorder 2023    Osteoarthritis 10/05/2021    Insomnia 2021    Post-traumatic osteoarthritis of right knee 2020    Hyperlipidemia 10/05/2020    Hypertension, essential 2020    Moderate episode of recurrent major depressive disorder 2020        TBI History: NO  Seizure History: NO    Past Surgical History:    Past Surgical History:   Procedure Laterality Date     SECTION      hysterctomy      KNEE SURGERY      *3         Current Evaluation:     Nutritional Screening: Considering the patient's height and weight, medications, medical history and preferences, should a referral be made to the dietitian? not at this time    Constitutional  Vitals:     Vitals:    23 0910   BP: 114/78   Pulse: 71   Weight: 80.7 kg (177 lb 14.6 oz)   Height:  5' (1.524 m)     General:  casual dress, obese (Class I, BMI 30.0 - 34.9)    Psychiatric / Mental Status Examination  Appearance: Dress is informal but appropriate.   Motor Activity: Normal.  Speech, Language Associations and Form of Thought: Clear, normal rate and volume. Associations intact. No tangentiality or thought blocking. Orderly thought process.   Mood and Affect: Mood is depressed. Affect is congruent with mood.  Content of Thought: Normal, no suicidality, no homicidality, delusions, or paranoia   Perceptions: No hallucinations.  Orientation: Grossly intact.  Memory: Intact for content of interview.  Attention Span & Concentration: Able to focus for interview.  Fund of Knowledge: Intact and appropriate to age and level of education.  Insight: Intact.  Judgment: Behavior is adequate to circumstances.    Neurological / Musculoskeletal / Osteopathic Structural  Gait, Station:  non-ataxic    Auto-populated Chart Data:     Laboratory Data  No visits with results within 1 Month(s) from this visit.   Latest known visit with results is:   Orders Only on 08/03/2022   Component Date Value Ref Range Status    CULTURE, AEROBIC AND ANAEROBIC 08/03/2022    Final       Medications  Outpatient Encounter Medications as of 1/18/2023   Medication Sig Dispense Refill    cyclobenzaprine (FLEXERIL) 10 MG tablet Take 10 mg by mouth nightly.      diclofenac (FLECTOR) 1.3 % PT12 APPLY 1 PATCH TOPICALLY ONCE DAILY (MAY WEAR UP TO 12HOURS.)      hydroCHLOROthiazide (HYDRODIURIL) 25 MG tablet Take 1 tablet (25 mg total) by mouth once daily. 90 tablet 0    irbesartan (AVAPRO) 300 MG tablet Take 1 tablet (300 mg total) by mouth every evening. 90 tablet 0    lidocaine (LIDODERM) 5 % APPLY 1 PATCH TOPICALLY ONCE DAILY (MAY WEAR UP TO 12HOURS.)      meloxicam (MOBIC) 15 MG tablet Take 15 mg by mouth once daily.      montelukast (SINGULAIR) 10 mg tablet TAKE 1 TABLET BY MOUTH ONCE DAILY IN THE EVENING 90 tablet 3    pantoprazole  (PROTONIX) 20 MG tablet Take 40 mg by mouth once daily.      potassium chloride SA (K-DUR,KLOR-CON) 20 MEQ tablet Take 1 tablet (20 mEq total) by mouth once daily. 90 tablet 0    rosuvastatin (CRESTOR) 20 MG tablet Take 1 tablet (20 mg total) by mouth once daily. 90 tablet 1    [DISCONTINUED] traZODone (DESYREL) 50 MG tablet Take 1 tablet by mouth in the evening 90 tablet 1    traZODone (DESYREL) 50 MG tablet Take 1 tablet (50 mg total) by mouth every evening. 30 tablet 1    vilazodone (VIIBRYD) 20 mg Tab Take 1 tablet (20 mg total) by mouth once daily. 30 tablet 1    [DISCONTINUED] doxycycline (VIBRAMYCIN) 100 MG Cap Take 1 capsule (100 mg total) by mouth every 12 (twelve) hours. 20 capsule 0    [DISCONTINUED] hydroCHLOROthiazide (HYDRODIURIL) 25 MG tablet Take 1 tablet by mouth once daily 90 tablet 0    [DISCONTINUED] irbesartan (AVAPRO) 300 MG tablet Take 1 tablet by mouth in the evening 90 tablet 0    [DISCONTINUED] mupirocin (BACTROBAN) 2 % ointment Apply topically 3 (three) times daily. 2 g 1    [DISCONTINUED] potassium chloride SA (K-DUR,KLOR-CON) 20 MEQ tablet Take 1 tablet by mouth once daily 90 tablet 0    [DISCONTINUED] rosuvastatin (CRESTOR) 20 MG tablet Take 1 tablet (20 mg total) by mouth once daily. 90 tablet 1     No facility-administered encounter medications on file as of 1/18/2023.       Assessment - Diagnosis - Goals:     Impression:      ICD-10-CM ICD-9-CM   1. Moderate episode of recurrent major depressive disorder  F33.1 296.32   2. Generalized anxiety disorder  F41.1 300.02   3. Insomnia, unspecified type  G47.00 780.52   4. BMI 34.0-34.9,adult  Z68.34 V85.34       Mirtha Stewart is a 52 y.o. female presenting at this clinic for intake with Depression and Sleeping Problem      Pt meets criteria for an episode of depression and has a positive history of multiple previous episodes. The severity of depression at this time is moderate. Predisposing factors include family history. Perpetuating  "factors include insufficient coverage of symptoms with psychotropic medications, chronic stress, and chronic pain. Considering negative history of elevated mood episodes (hypomanic or manic), negative history of "mixed mood" states, Bipolarity Index scored as low (37/100), and MDQ scored as negative, this episode of depression is most likely of the unipolar type. The patient meets criteria for major depression disorder (MDD).  Patient meets criteria for HOLLI (generalized anxiety disorder). Pt scored positive on GAD7. The GAD7 has acceptable properties for identifying HOLLI at cutoff scores 7 to 10. Potential organic and biomedical factors influencing the case's presentation will need to be ruled out.    This case formulation is informed by multiple biomedical and psychosocial factors.     Biomedical factors complicate psychopharmacological treatment, including chronic pain, concurrent medications for medical problems with potential for drug-to-drug interactions, pt's treatment history with psychotropics, and medical comorbidities. Medical comorbidities of concern include cardiovascular disease, chronic pain, concerns about weight gain, and obesity.     Psychotropics to avoid may include those which are likely to cause weight gain, likely to worsen cardiovascular risk by worsening lipid profile, and medications which have unfavorable effects upon sharp discontinuation.     Psychosocial factors which would inform the treatment plan and psychotherapy would include role transitions, guilt, and loss of functionality due to pain.     Relevant social determinants of health include stress and depressed mood.     This patient will benefit from treatment that would include both psychotherapy and medications.     The patient's receptivity to treatment, availability with scheduling, engagement in health and wellness, and having hobbies are good prognostic factors.    Pt agrees to safety and no safety concerns were identified " during the interview. Violence risk, suicide risk and case risk at this time are not high.     Strengths and Liabilities: Strength: Patient accepts guidance/feedback, Strength: Patient is expressive/articulate., Strength: Patient has reasonable judgment.    Treatment Goals:  Specify outcomes written in observable, behavioral terms:   Anxiety: acquiring relapse prevention skills and reducing time spent worrying (<30 minutes/day)  Depression: acquiring relapse prevention skills, increasing energy, increasing interest in usual activities, reducing excessive guilt, reducing fatigue, and reducing negative automatic thoughts    Adherence Risk Assessment: Patient's overall ability to adhere to the treatment plan is fair. Barriers to adherence to treatment are unclear. Barriers to adherence may include previous unfavorable experiences during treatment, poor measures of perceived self efficacy and/or helplessness (PSS-10), and having risk factors for secondary nonadherence (including a history of nonpersistence). Strategies to improve adherence may include addressing potential barriers and reducing risks for nonadherence (integrating the pt preferences, counseling and psychoeducation, addressing patient expectations, addressing hope, maintaining contact with therapist, etc.).    Treatment Plan/Recommendations:   Chart reviewed.  Medication management: The risks and benefits of medication were discussed with the patient.  Psychotropic history includes CYMBALTA, EFFEXOR, LEXAPRO, TRAZODONE, TRINTELLIX, and WELLBUTRIN.  Increase TRAZODONE  Symptom coverage is insufficient  Titration:  18JAN2023: Increase to 50mg HS (scheduled)  Prior to evaluation pt had been taking 50mg HS PRN INSOMNIA  Start VIIBRYD  Chosen for MDD (FDA approved), low risk for weight gain, poor concentration and fatigue, poor response to other drug classes.  Pt was provided [facility] educational material 1/18/2023.  Titration:  [id number]: Increase to 20mg  daily  18JAN2023: Start 10mg daily  Prior to evaluation pt had been taking TRAZODONE  Counseling, support and psychoeducation provided.  Counseled on diet, provided educational material in AVS  Counseled on proper medication administration and adherence.   Pt was provided counseling on sleep hygiene. Pt was provided educational material in the AVS.  Referral:  Referral for individual psychotherapy  Labs:  Vitamin D (Relevant risk factor(s) for deficiency or insufficiency: a previous history of deficiency or insufficiency, depressed mood, nonspecific musculoskeletal pains, obesity, poor diet, sleep changes, and using a medication that interferes with absorption or stability (Statin))  Monitor:  GAD7 and PHQ9  MDI  PSS10  Monitor sleep - Will consider starting a sleep diary at a future encounter.  Monitor vitals, especially weight and BP  Monitor labs (see above)  The treatment plan and followup plan were reviewed with the patient  Pt understands to contact clinic if symptoms worsen, and to call 911 or go to nearest ER for suicidal ideation, intent or plan.    Return: 1 month, CLINIC ONLY (not virtual), for medication management only     Billing Documentation:     Method of Encounter IN PERSON visit at the clinic   Type of Encounter New patient to me, NOT seen by department within last 3 years   Counseling;  Psychotherapy Not applicable: Not done or UNDER 16 minutes   Counseling;  Tobacco and/or Nicotine Not applicable: Not done or UNDER 3 minutes   Additional Codes and Modifiers None   Time Remaining Chart/Pt 62024: New patient to me and DID prescribe meds (and two or fewer 47668/23282 codes within a year)   Total Mins  (1/18/2023) 070        Juno Valdovinos DO  Department of Psychiatry

## 2023-01-19 ENCOUNTER — PATIENT MESSAGE (OUTPATIENT)
Dept: PSYCHIATRY | Facility: CLINIC | Age: 53
End: 2023-01-19
Payer: MEDICAID

## 2023-02-16 ENCOUNTER — LAB VISIT (OUTPATIENT)
Dept: LAB | Facility: HOSPITAL | Age: 53
End: 2023-02-16
Attending: STUDENT IN AN ORGANIZED HEALTH CARE EDUCATION/TRAINING PROGRAM
Payer: MEDICAID

## 2023-02-16 PROCEDURE — 36415 COLL VENOUS BLD VENIPUNCTURE: CPT | Performed by: STUDENT IN AN ORGANIZED HEALTH CARE EDUCATION/TRAINING PROGRAM

## 2023-02-16 PROCEDURE — 82652 VIT D 1 25-DIHYDROXY: CPT | Performed by: STUDENT IN AN ORGANIZED HEALTH CARE EDUCATION/TRAINING PROGRAM

## 2023-02-17 ENCOUNTER — HOSPITAL ENCOUNTER (OUTPATIENT)
Dept: RADIOLOGY | Facility: HOSPITAL | Age: 53
Discharge: HOME | End: 2023-02-17
Attending: FAMILY MEDICINE
Payer: MEDICAID

## 2023-02-17 ENCOUNTER — OFFICE VISIT (OUTPATIENT)
Dept: PSYCHIATRY | Facility: CLINIC | Age: 53
End: 2023-02-17
Payer: MEDICAID

## 2023-02-17 VITALS
HEART RATE: 70 BPM | HEIGHT: 60 IN | SYSTOLIC BLOOD PRESSURE: 129 MMHG | DIASTOLIC BLOOD PRESSURE: 80 MMHG | WEIGHT: 181.56 LBS | BODY MASS INDEX: 35.64 KG/M2

## 2023-02-17 DIAGNOSIS — G47.00 INSOMNIA, UNSPECIFIED TYPE: ICD-10-CM

## 2023-02-17 DIAGNOSIS — R92.8 ABNORMAL MAMMOGRAM: ICD-10-CM

## 2023-02-17 DIAGNOSIS — F41.1 GENERALIZED ANXIETY DISORDER: ICD-10-CM

## 2023-02-17 DIAGNOSIS — F33.1 MODERATE EPISODE OF RECURRENT MAJOR DEPRESSIVE DISORDER: Primary | ICD-10-CM

## 2023-02-17 PROCEDURE — 1160F PR REVIEW ALL MEDS BY PRESCRIBER/CLIN PHARMACIST DOCUMENTED: ICD-10-PCS | Mod: AF,HB,CPTII, | Performed by: STUDENT IN AN ORGANIZED HEALTH CARE EDUCATION/TRAINING PROGRAM

## 2023-02-17 PROCEDURE — 3074F PR MOST RECENT SYSTOLIC BLOOD PRESSURE < 130 MM HG: ICD-10-PCS | Mod: AF,HB,CPTII, | Performed by: STUDENT IN AN ORGANIZED HEALTH CARE EDUCATION/TRAINING PROGRAM

## 2023-02-17 PROCEDURE — 99215 PR OFFICE/OUTPT VISIT, EST, LEVL V, 40-54 MIN: ICD-10-PCS | Mod: S$PBB,AF,HB, | Performed by: STUDENT IN AN ORGANIZED HEALTH CARE EDUCATION/TRAINING PROGRAM

## 2023-02-17 PROCEDURE — 3079F DIAST BP 80-89 MM HG: CPT | Mod: AF,HB,CPTII, | Performed by: STUDENT IN AN ORGANIZED HEALTH CARE EDUCATION/TRAINING PROGRAM

## 2023-02-17 PROCEDURE — 3008F BODY MASS INDEX DOCD: CPT | Mod: AF,HB,CPTII, | Performed by: STUDENT IN AN ORGANIZED HEALTH CARE EDUCATION/TRAINING PROGRAM

## 2023-02-17 PROCEDURE — 1160F RVW MEDS BY RX/DR IN RCRD: CPT | Mod: AF,HB,CPTII, | Performed by: STUDENT IN AN ORGANIZED HEALTH CARE EDUCATION/TRAINING PROGRAM

## 2023-02-17 PROCEDURE — 3074F SYST BP LT 130 MM HG: CPT | Mod: AF,HB,CPTII, | Performed by: STUDENT IN AN ORGANIZED HEALTH CARE EDUCATION/TRAINING PROGRAM

## 2023-02-17 PROCEDURE — 1159F MED LIST DOCD IN RCRD: CPT | Mod: AF,HB,CPTII, | Performed by: STUDENT IN AN ORGANIZED HEALTH CARE EDUCATION/TRAINING PROGRAM

## 2023-02-17 PROCEDURE — 99215 OFFICE O/P EST HI 40 MIN: CPT | Mod: S$PBB,AF,HB, | Performed by: STUDENT IN AN ORGANIZED HEALTH CARE EDUCATION/TRAINING PROGRAM

## 2023-02-17 PROCEDURE — 4010F PR ACE/ARB THEARPY RXD/TAKEN: ICD-10-PCS | Mod: AF,HB,CPTII, | Performed by: STUDENT IN AN ORGANIZED HEALTH CARE EDUCATION/TRAINING PROGRAM

## 2023-02-17 PROCEDURE — 99214 OFFICE O/P EST MOD 30 MIN: CPT | Mod: PBBFAC,PN | Performed by: STUDENT IN AN ORGANIZED HEALTH CARE EDUCATION/TRAINING PROGRAM

## 2023-02-17 PROCEDURE — 76642 ULTRASOUND BREAST LIMITED: CPT | Mod: TC,PO,LT

## 2023-02-17 PROCEDURE — 3008F PR BODY MASS INDEX (BMI) DOCUMENTED: ICD-10-PCS | Mod: AF,HB,CPTII, | Performed by: STUDENT IN AN ORGANIZED HEALTH CARE EDUCATION/TRAINING PROGRAM

## 2023-02-17 PROCEDURE — 77062 BREAST TOMOSYNTHESIS BI: CPT | Mod: TC,PO

## 2023-02-17 PROCEDURE — 3079F PR MOST RECENT DIASTOLIC BLOOD PRESSURE 80-89 MM HG: ICD-10-PCS | Mod: AF,HB,CPTII, | Performed by: STUDENT IN AN ORGANIZED HEALTH CARE EDUCATION/TRAINING PROGRAM

## 2023-02-17 PROCEDURE — 4010F ACE/ARB THERAPY RXD/TAKEN: CPT | Mod: AF,HB,CPTII, | Performed by: STUDENT IN AN ORGANIZED HEALTH CARE EDUCATION/TRAINING PROGRAM

## 2023-02-17 PROCEDURE — 1159F PR MEDICATION LIST DOCUMENTED IN MEDICAL RECORD: ICD-10-PCS | Mod: AF,HB,CPTII, | Performed by: STUDENT IN AN ORGANIZED HEALTH CARE EDUCATION/TRAINING PROGRAM

## 2023-02-17 PROCEDURE — 99999 PR PBB SHADOW E&M-EST. PATIENT-LVL IV: ICD-10-PCS | Mod: PBBFAC,AF,HB, | Performed by: STUDENT IN AN ORGANIZED HEALTH CARE EDUCATION/TRAINING PROGRAM

## 2023-02-17 PROCEDURE — 99999 PR PBB SHADOW E&M-EST. PATIENT-LVL IV: CPT | Mod: PBBFAC,AF,HB, | Performed by: STUDENT IN AN ORGANIZED HEALTH CARE EDUCATION/TRAINING PROGRAM

## 2023-02-17 RX ORDER — VILAZODONE HYDROCHLORIDE 40 MG/1
40 TABLET ORAL DAILY
Qty: 90 TABLET | Refills: 1 | Status: SHIPPED | OUTPATIENT
Start: 2023-02-17 | End: 2023-03-20 | Stop reason: SDUPTHER

## 2023-02-17 NOTE — PROGRESS NOTES
Outpatient Psychiatry Followup Visit (DO/MD/NP)    2/17/2023    Mirtha Stewart, a 52 y.o. female, presenting for followup visit.     Reason for Encounter: Medication management. Met with patient, in person.     Assessment - Diagnosis - Goals:     Diagnostic Impression     ICD-10-CM ICD-9-CM   1. Moderate episode of recurrent major depressive disorder  F33.1 296.32   2. Generalized anxiety disorder  F41.1 300.02   3. Insomnia, unspecified type  G47.00 780.52   4. BMI 35.0-35.9,adult  Z68.35 V85.35      Progress See most recent case formulation. Primary symptom(s) treatment.     Medication Management   Chart was reviewed. The risks and benefits of medication were discussed with pt. The treatment plan and followup plan were reviewed with pt. Pt understands to contact clinic if symptoms worsen. Pt understand to call 911 or go to nearest ER for suicidal ideation, intent or plan.   RX History Psychotropic history includes  CYMBALTA, EFFEXOR, LEXAPRO, TRAZODONE, TRINTELLIX, VIIBRYD and WELLBUTRIN   Current RX Continue TRAZODONE  Symptom coverage is sufficient  Titration:  [id number]: Increase to 50mg HS (scheduled)  Prior to evaluation pt had been taking 50mg HS PRN INSOMNIA  Increase VIIBRYD  Chosen for MDD (FDA approved), low risk for weight gain, poor concentration and fatigue, poor response to other drug classes.  Titration:  [id number]: Increase to 40mg daily  48YBQ4251: Increase to 20mg daily  [id number]: Start 10mg daily  Prior to evaluation pt had been taking TRAZODONE   Education & Counseling RX administration and adherence   Other Orders PENDING from past encounter   Monitor GAD7, PHQ9, PSS4  MDI  Monitor sleep - Will consider starting a sleep diary at a future encounter.  Monitor vitals, especially weight and BP   Return: 1 month, CLINIC ONLY (not virtual), for medication management only     Interval History:     Patient did not display signs of nor endorse symptoms of overt psychosis or acute mood disorder  requiring hospitalization during the encounter. Patient denied violent thoughts or suicidal or homicidal ideation, intent, or plan.    Pt is pleasant and cooperative on interview. This visit was done in person in the clinic.    No worse since last visit. Studying for exercise physiology test on Sunday.    Exercising and watching diet. BMI partially elevated due to increased fat-free mass from bodybuilding.    Discussed medical and pain concerns. Taking FLEXERIL for pain. Knee pain is worse when active. See pain specialist in about 2 weeks.     Vit D level pending.    Improvements on all scales. Brighter this visit.    Taking medications as prescribed. Discussed increasing VIIBRYD.     Instruments:     Routine Instruments   GAD7 Interpretation: 6/21 (2/17/2023); MILD using 5-10-15 cutoffs. Compared to MODERATE 10/21  last time, GAD7 IMPROVED.   PHQ9 Interpretation: 12/27 (2/17/2023); MILD using 7-15-21 cutoffs, but there tends to be significant variability in sensitivity of cutoff scores between 7 and 15. Compared to MODERATE 19/27 last time, PHQ9 IMPROVED.   PSS4 Interpretation: 8/16 (2/17/2023); MODERATE using 6-11 cutoffs. 1/2 PH, 0/2 LSE. This is a new baseline reading. Stratification of stress severity was done with PSS10 last time; compared to HIGH 31/40 last time, stress IMPROVED.   Additional Instruments   MDI Interpretation: 29/50 (2/17/2023); MODERATE DEPRESSION SEVERITY using 21-26-31 cutoffs. Compared to SEVERE DEPRESSION SEVERITY 31/50 last time, MDI IMPROVED.      Review of Systems:     Personal Functioning   Sleep Better. Sleep onset is usually within 30 minutes.   Emotion Depression persists.   Appetite No concerns.   Stress Stress has worsened.   Anxiety Anxiety is not a problem.   Interpersonal Functioning   Home    Peers no concerns identified   Work N/A     Current Evaluation:     Constitutional  Vitals:     Vitals:    02/17/23 1439   BP: 129/80   Pulse: 70   Weight: 82.3 kg (181 lb 8.8  oz)   Height: 5' (1.524 m)     General:  casual dress, obese (Class II, BMI 35.0 - 39.9)    Psychiatric / Mental Status Examination  1. Appearance: Dress is informal but appropriate. Motor activity normal.  2. Discourse: Clear speech with normal rate and volume. Associations intact. Orderly and without tangentiality.  3. Emotional Expression: Depressed mood. Affect is appropriate.  4. Perception and Thinking: No hallucinations. No suicidality, no homicidality, delusions, or paranoia.  5. Sensorium: Grossly intact. Able to focus for interview.  6. Memory and Fund of Knowledge: Intact for content of interview.  7. Insight and Judgment: Intact.    Neurological / Musculoskeletal / Osteopathic Structural  Gait, Station:  non-ataxic     Auto-populated chart data omitted from this note for brevity.    Billing Documentation:     Method of Encounter IN PERSON visit at the clinic   Type of Encounter Follow up visit with me   Counseling;  Psychotherapy Not applicable: Not done or UNDER 16 minutes   Counseling;  Tobacco and/or Nicotine Not applicable: Not done or UNDER 3 minutes   Additional Codes and Modifiers None   Time Remaining Chart/Pt 30127: FOLLOW UP VISIT 40 minutes   Total Mins  (2/17/2023) 040        Juno Valdovinos DO  Department of Psychiatry

## 2023-02-17 NOTE — PATIENT INSTRUCTIONS
Ask primary care and/or specialists about whether you are a candidate for the stellate ganglion block for COVID-associated dysgeusia (taste distortion)    Increase VIIBRYD to 40mg daily (or nightly if there's too much sedation)    Continue TRAZODONE 50mg nightly before bed    Keep appointments with therapist (LORENA Goldsmith LCSW).

## 2023-02-20 PROBLEM — E55.9 VITAMIN D INSUFFICIENCY: Status: ACTIVE | Noted: 2023-02-20

## 2023-02-20 LAB — 1,25(OH)2D3 SERPL-MCNC: 24 PG/ML (ref 24.8–81.5)

## 2023-02-20 NOTE — PROGRESS NOTES
Noted Vitamin D INSUFFICIENCY. Pt notified by portal. Recommended to start OTC PO supplementation and to follow up with primary care for further management.

## 2023-02-25 ENCOUNTER — PATIENT MESSAGE (OUTPATIENT)
Dept: FAMILY MEDICINE | Facility: CLINIC | Age: 53
End: 2023-02-25
Payer: MEDICAID

## 2023-02-28 RX ORDER — HYDROCHLOROTHIAZIDE 25 MG/1
25 TABLET ORAL DAILY
Qty: 30 TABLET | Refills: 0 | OUTPATIENT
Start: 2023-02-28

## 2023-02-28 NOTE — TELEPHONE ENCOUNTER
----- Message from Reggie Knott sent at 2/27/2023  3:18 PM CST -----  Contact: Self  Type:  Patient Returning Call    Who Called:  Patient  Who Left Message for Patient:  Wgendolyn  Does the patient know what this is regarding?:  yes  Best Call Back Number:  [phone]   Additional Information:   Would also like refill of hydroCHLOROthiazide (HYDRODIURIL) 25 MG tablet if rescheduled after 3/1, states she has 2d left.       [address] [address]  Phone: [phone] Fax: [phone]

## 2023-03-06 ENCOUNTER — PATIENT MESSAGE (OUTPATIENT)
Dept: FAMILY MEDICINE | Facility: CLINIC | Age: 53
End: 2023-03-06
Payer: MEDICAID

## 2023-03-19 NOTE — PROGRESS NOTES
Outpatient Psychiatry Followup Visit (DO/MD/NP)    3/20/2023    Mirtha Stewart, a 53 y.o. female, presenting for followup visit.     Assessment - Diagnosis - Goals:     Diagnostic Impression     ICD-10-CM ICD-9-CM   1. Moderate episode of recurrent major depressive disorder  F33.1 296.32   2. Generalized anxiety disorder  F41.1 300.02   3. Insomnia, unspecified type  G47.00 780.52   4. Vitamin D insufficiency  E55.9 268.9   5. BMI 34.0-34.9,adult  Z68.34 V85.34      Progress See most recent case formulation. Primary symptom(s) treatment. Acute stress is worsening symptoms.     Medication Management   Chart was reviewed. The risks and benefits of medication were discussed with pt. The treatment plan and followup plan were reviewed with pt. Pt understands to contact clinic if symptoms worsen. Pt understand to call 911 or go to nearest ER for suicidal ideation, intent or plan.   RX History Psychotropic history includes  CYMBALTA, EFFEXOR, LEXAPRO, TRAZODONE, TRINTELLIX, VIIBRYD and WELLBUTRIN   Current RX Increase TRAZODONE  Symptom coverage is sufficient  Titration:  [id number]: Increase to 100mg HS  40QQI2776: Increase to 50mg HS (scheduled)  Prior to evaluation pt had been taking 50mg HS PRN INSOMNIA  Continue VIIBRYD  Chosen for MDD (FDA approved), low risk for weight gain, poor concentration and fatigue, poor response to other drug classes.  Titration:  [id number]: Increase to 40mg daily  92TJT0535: Increase to 20mg daily  90YRG9506: Start 10mg daily  Prior to evaluation pt had been taking TRAZODONE   Education & Counseling RX administration and adherence   Other Orders PENDING from past encounter   Monitor VITAL SIGNS  Instruments: PROMIS (A&D), PSS4   RETURN 4 weeks/1 month  preferably same day as therapy     Interval History:     Patient did not display signs of nor endorse symptoms of overt psychosis or acute mood disorder requiring hospitalization during the encounter. Patient denied violent thoughts or  "suicidal or homicidal ideation, intent, or plan.     Pt is pleasant and cooperative on interview. This visit was done in person in the clinic.    Had a GI bug and missed therapy intake.     Stressed. Did not pass exercise physiology test and is disappointed about this. Looking for a new job. Sleep has worsened. Mood and anxiety worse.    Vitamin D level is low. Recommended follow up with primary care.     Personal Functioning   Sleep Worse. Sleep onset is taking several hours. Duration is 5-6 hours with 2 or 3 interruptions due to hot flashes and spontaneous awakenings.   Emotion Depression worsening. Energy remains low.   Appetite No concerns.   Stress Worse.   Anxiety Worse.   Interpersonal Functioning   Home    Peers no concerns identified   Work N/A     Instruments:     Routine Instruments   PROMIS-ANXIETY Interpretation: T-SCORE 67; MODERATE using 55-60-70 cutoffs. Stratification of anxiety severity was done with GAD7 last time; compared to MILD 6/21 last time, severity probably WORSENED. Changed instruments today; severity changes may be artifact. See "Interval History."   PROMIS-DEPRESSION Interpretation: T-SCORE 69; MODERATE using 55-60-70 cutoffs. Stratification of depression severity was done with MDI last time; compared to MODERATE DEPRESSION SEVERITY 29/50 last time, severity probably is about the same. Changed instruments today; severity changes may be artifact. See "Interval History."   PSS4 Interpretation: 14/16; HIGH using 6-11 cutoffs. 2/2 PH, 1/2 LSE. Compared to MODERATE 8/16 last time, PSS4 WORSENED.   Additional Instruments   N/A        Review of Systems:     See HPI.    Current Evaluation:     Constitutional       Vitals:    03/20/23 1417   BP: 126/84   Pulse: 76   Weight: 81.2 kg (179 lb 0.2 oz)   Height: 5' (1.524 m)     General:  casual dress, obese (Class I, BMI 30.0 - 34.9)    Psychiatric / Mental Status Examination  1. Appearance: Dress is informal but appropriate. Motor activity " normal.  2. Discourse: Clear speech with normal rate and volume. Associations intact. Orderly and without tangentiality.  3. Emotional Expression: Anxious and depressed mood. Affect is appropriate.  4. Perception and Thinking: No hallucinations. No suicidality, no homicidality, delusions, or paranoia.  5. Sensorium: Grossly intact. Able to focus for interview.  6. Memory and Fund of Knowledge: Intact for content of interview.  7. Insight and Judgment: Intact.    Neurological / Musculoskeletal / Osteopathic Structural  Gait, Station:  non-ataxic     Auto-populated chart data omitted from this note for brevity.    Billing Documentation:     Method of Encounter IN PERSON visit at the clinic   Type of Encounter Follow up visit with me   Counseling;  Psychotherapy Not applicable: Not done or UNDER 16 minutes   Counseling;  Tobacco and/or Nicotine Not applicable: Not done or UNDER 3 minutes   Additional Codes and Modifiers None   Time Remaining Chart/Pt 12512: medication management follow up, 1+ unstable chronic illness   Total Mins  (3/20/2023) N/A - Not billing for time        Juno Valdovinos DO  Department of Psychiatry

## 2023-03-20 ENCOUNTER — OFFICE VISIT (OUTPATIENT)
Dept: PSYCHIATRY | Facility: CLINIC | Age: 53
End: 2023-03-20
Payer: MEDICAID

## 2023-03-20 VITALS
HEIGHT: 60 IN | SYSTOLIC BLOOD PRESSURE: 126 MMHG | BODY MASS INDEX: 35.14 KG/M2 | HEART RATE: 76 BPM | WEIGHT: 179 LBS | DIASTOLIC BLOOD PRESSURE: 84 MMHG

## 2023-03-20 DIAGNOSIS — F41.1 GENERALIZED ANXIETY DISORDER: ICD-10-CM

## 2023-03-20 DIAGNOSIS — F33.1 MODERATE EPISODE OF RECURRENT MAJOR DEPRESSIVE DISORDER: Primary | ICD-10-CM

## 2023-03-20 DIAGNOSIS — G47.00 INSOMNIA, UNSPECIFIED TYPE: ICD-10-CM

## 2023-03-20 DIAGNOSIS — E55.9 VITAMIN D INSUFFICIENCY: ICD-10-CM

## 2023-03-20 PROCEDURE — 1159F PR MEDICATION LIST DOCUMENTED IN MEDICAL RECORD: ICD-10-PCS | Mod: AF,HB,CPTII, | Performed by: STUDENT IN AN ORGANIZED HEALTH CARE EDUCATION/TRAINING PROGRAM

## 2023-03-20 PROCEDURE — 1160F PR REVIEW ALL MEDS BY PRESCRIBER/CLIN PHARMACIST DOCUMENTED: ICD-10-PCS | Mod: AF,HB,CPTII, | Performed by: STUDENT IN AN ORGANIZED HEALTH CARE EDUCATION/TRAINING PROGRAM

## 2023-03-20 PROCEDURE — 4010F PR ACE/ARB THEARPY RXD/TAKEN: ICD-10-PCS | Mod: AF,HB,CPTII, | Performed by: STUDENT IN AN ORGANIZED HEALTH CARE EDUCATION/TRAINING PROGRAM

## 2023-03-20 PROCEDURE — 3008F PR BODY MASS INDEX (BMI) DOCUMENTED: ICD-10-PCS | Mod: AF,HB,CPTII, | Performed by: STUDENT IN AN ORGANIZED HEALTH CARE EDUCATION/TRAINING PROGRAM

## 2023-03-20 PROCEDURE — 3008F BODY MASS INDEX DOCD: CPT | Mod: AF,HB,CPTII, | Performed by: STUDENT IN AN ORGANIZED HEALTH CARE EDUCATION/TRAINING PROGRAM

## 2023-03-20 PROCEDURE — 3079F DIAST BP 80-89 MM HG: CPT | Mod: AF,HB,CPTII, | Performed by: STUDENT IN AN ORGANIZED HEALTH CARE EDUCATION/TRAINING PROGRAM

## 2023-03-20 PROCEDURE — 3074F PR MOST RECENT SYSTOLIC BLOOD PRESSURE < 130 MM HG: ICD-10-PCS | Mod: AF,HB,CPTII, | Performed by: STUDENT IN AN ORGANIZED HEALTH CARE EDUCATION/TRAINING PROGRAM

## 2023-03-20 PROCEDURE — 99999 PR PBB SHADOW E&M-EST. PATIENT-LVL III: CPT | Mod: PBBFAC,AF,HB, | Performed by: STUDENT IN AN ORGANIZED HEALTH CARE EDUCATION/TRAINING PROGRAM

## 2023-03-20 PROCEDURE — 99214 OFFICE O/P EST MOD 30 MIN: CPT | Mod: S$PBB,AF,HB, | Performed by: STUDENT IN AN ORGANIZED HEALTH CARE EDUCATION/TRAINING PROGRAM

## 2023-03-20 PROCEDURE — 3079F PR MOST RECENT DIASTOLIC BLOOD PRESSURE 80-89 MM HG: ICD-10-PCS | Mod: AF,HB,CPTII, | Performed by: STUDENT IN AN ORGANIZED HEALTH CARE EDUCATION/TRAINING PROGRAM

## 2023-03-20 PROCEDURE — 99999 PR PBB SHADOW E&M-EST. PATIENT-LVL III: ICD-10-PCS | Mod: PBBFAC,AF,HB, | Performed by: STUDENT IN AN ORGANIZED HEALTH CARE EDUCATION/TRAINING PROGRAM

## 2023-03-20 PROCEDURE — 1160F RVW MEDS BY RX/DR IN RCRD: CPT | Mod: AF,HB,CPTII, | Performed by: STUDENT IN AN ORGANIZED HEALTH CARE EDUCATION/TRAINING PROGRAM

## 2023-03-20 PROCEDURE — 1159F MED LIST DOCD IN RCRD: CPT | Mod: AF,HB,CPTII, | Performed by: STUDENT IN AN ORGANIZED HEALTH CARE EDUCATION/TRAINING PROGRAM

## 2023-03-20 PROCEDURE — 99214 PR OFFICE/OUTPT VISIT, EST, LEVL IV, 30-39 MIN: ICD-10-PCS | Mod: S$PBB,AF,HB, | Performed by: STUDENT IN AN ORGANIZED HEALTH CARE EDUCATION/TRAINING PROGRAM

## 2023-03-20 PROCEDURE — 99213 OFFICE O/P EST LOW 20 MIN: CPT | Mod: PBBFAC,PN | Performed by: STUDENT IN AN ORGANIZED HEALTH CARE EDUCATION/TRAINING PROGRAM

## 2023-03-20 PROCEDURE — 3074F SYST BP LT 130 MM HG: CPT | Mod: AF,HB,CPTII, | Performed by: STUDENT IN AN ORGANIZED HEALTH CARE EDUCATION/TRAINING PROGRAM

## 2023-03-20 PROCEDURE — 4010F ACE/ARB THERAPY RXD/TAKEN: CPT | Mod: AF,HB,CPTII, | Performed by: STUDENT IN AN ORGANIZED HEALTH CARE EDUCATION/TRAINING PROGRAM

## 2023-03-20 RX ORDER — VILAZODONE HYDROCHLORIDE 40 MG/1
40 TABLET ORAL DAILY
Qty: 90 TABLET | Refills: 1 | Status: SHIPPED | OUTPATIENT
Start: 2023-03-20 | End: 2023-05-11 | Stop reason: SDUPTHER

## 2023-03-20 RX ORDER — TRAZODONE HYDROCHLORIDE 100 MG/1
100 TABLET ORAL NIGHTLY
Qty: 90 TABLET | Refills: 1 | Status: SHIPPED | OUTPATIENT
Start: 2023-03-20 | End: 2023-04-19 | Stop reason: DRUGHIGH

## 2023-03-20 NOTE — PATIENT INSTRUCTIONS
Keep therapy appointments  Follow up with primary care regarding vitamin D insufficiency and other medical problems  Increase TRAZODONE to 100mg NIGHTLY  Continue VIIBRYD 40mg daily with food

## 2023-04-11 ENCOUNTER — PATIENT MESSAGE (OUTPATIENT)
Dept: ADMINISTRATIVE | Facility: HOSPITAL | Age: 53
End: 2023-04-11
Payer: MEDICAID

## 2023-04-13 ENCOUNTER — OFFICE VISIT (OUTPATIENT)
Dept: PSYCHIATRY | Facility: CLINIC | Age: 53
End: 2023-04-13
Payer: MEDICAID

## 2023-04-13 DIAGNOSIS — F43.21 ADJUSTMENT DISORDER WITH DEPRESSED MOOD: ICD-10-CM

## 2023-04-13 DIAGNOSIS — F33.1 MODERATE EPISODE OF RECURRENT MAJOR DEPRESSIVE DISORDER: Primary | ICD-10-CM

## 2023-04-13 PROCEDURE — 4010F PR ACE/ARB THEARPY RXD/TAKEN: ICD-10-PCS | Mod: AJ,HB,CPTII, | Performed by: SOCIAL WORKER

## 2023-04-13 PROCEDURE — 99999 PR PBB SHADOW E&M-EST. PATIENT-LVL II: ICD-10-PCS | Mod: PBBFAC,AJ,HB, | Performed by: SOCIAL WORKER

## 2023-04-13 PROCEDURE — 99999 PR PBB SHADOW E&M-EST. PATIENT-LVL II: CPT | Mod: PBBFAC,AJ,HB, | Performed by: SOCIAL WORKER

## 2023-04-13 PROCEDURE — 1159F PR MEDICATION LIST DOCUMENTED IN MEDICAL RECORD: ICD-10-PCS | Mod: AJ,HB,CPTII, | Performed by: SOCIAL WORKER

## 2023-04-13 PROCEDURE — 90791 PSYCH DIAGNOSTIC EVALUATION: CPT | Mod: AJ,HB,, | Performed by: SOCIAL WORKER

## 2023-04-13 PROCEDURE — 4010F ACE/ARB THERAPY RXD/TAKEN: CPT | Mod: AJ,HB,CPTII, | Performed by: SOCIAL WORKER

## 2023-04-13 PROCEDURE — 99212 OFFICE O/P EST SF 10 MIN: CPT | Mod: PBBFAC,PN | Performed by: SOCIAL WORKER

## 2023-04-13 PROCEDURE — 1159F MED LIST DOCD IN RCRD: CPT | Mod: AJ,HB,CPTII, | Performed by: SOCIAL WORKER

## 2023-04-13 PROCEDURE — 90791 PR PSYCHIATRIC DIAGNOSTIC EVALUATION: ICD-10-PCS | Mod: AJ,HB,, | Performed by: SOCIAL WORKER
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
## 2023-04-17 NOTE — PROGRESS NOTES
Outpatient Psychiatry Followup Visit (DO/MD/NP)    4/19/2023    Assessment - Plan:     Diagnostic Impression     ICD-10-CM ICD-9-CM   1. Moderate episode of recurrent major depressive disorder  F33.1 296.32   2. Generalized anxiety disorder  F41.1 300.02   3. Insomnia, unspecified type  G47.00 780.52   4. BMI 34.0-34.9,adult  Z68.34 V85.34      4/19/2023 (3) Ongoing treatment of primary symptoms with some favorable progress.     Medication Management   Chart was reviewed. The risks and benefits of medication were discussed with pt. The treatment plan and followup plan were reviewed with pt. Pt understands to contact clinic if symptoms worsen. Pt understand to call 911 or go to nearest ER for suicidal ideation, intent or plan.   RX History CYMBALTA, EFFEXOR, LEXAPRO, TRAZODONE, TRINTELLIX, VIIBRYD and WELLBUTRIN   Current RX Increase TRAZODONE  To better address depression  Titration:  29GEO7048: Increase to 150mg HS  71SHU4920: Increase to 100mg HS  61HMO5418: Increase to 50mg HS (scheduled)  Prior to evaluation pt had been taking 50mg HS PRN INSOMNIA  Continue VIIBRYD  Chosen for MDD (FDA approved), low risk for weight gain, poor concentration and fatigue, poor response to other drug classes.  Titration:  01MXX3687: Increase to 40mg daily  90TMR8235: Increase to 20mg daily  36BTQ2711: Start 10mg daily  Prior to evaluation pt had been taking TRAZODONE   Education & Counseling RX administration and adherence   Other Orders Continue psychotherapy  Provided clinic gift card to [employer] to help alleviate financial strain   Monitor VITAL SIGNS  Instruments: PROMIS (A&D), PSS4   RETURN 4 weeks/1 month  for medication management only     Interval History - Review of Systems:     Patient did not display signs of nor endorse symptoms of overt psychosis or acute mood disorder requiring hospitalization during the encounter. Patient denied violent thoughts or suicidal or homicidal ideation, intent, or plan.     Mirtha RODRIGUEZ  Terry, a 53 y.o. female, presenting for followup visit.     Pt is pleasant and cooperative on interview. This visit was done in person in the clinic.    Not feeling worse, but not feeling better.    Job hunting. Uncertainty. Workman's comp causing problems. Financial strain. Selling belongings on Facebook to manage.    Discussed increasing TRAZODONE to better address depression treatment.    Considering adding a third agent for anxiety after TRAZODONE adjustment.     Personal Functioning   Sleep About the same.   Emotion About the same.   Appetite About the same.   Stress High.   Anxiety Overall worse.  RUMINATION: worrying worse.   Cognition Overall no concerns.   Interpersonal Functioning   Home    Peers About the same.   Work N/A     Measurement-Based Care (MBC):     Routine Instruments   PROMIS-ANXIETY Interpretation: T-SCORE 70+; SEVERE using 55-60-70 cutoffs. Compared to MODERATE (67) last time, PROMIS-ANXIETY WORSENED.   PROMIS-DEPRESSION Interpretation: T-SCORE 69; MODERATE using 55-60-70 cutoffs. Compared to MODERATE (69) last time, PROMIS-DEPRESSION shows NO significant change.   PSS4 Interpretation: 14/16; HIGH using 6-11 cutoffs. 2 PH, 1 LSE. Compared to HIGH 14/16 last time, PSS4 is relatively stable.   Additional Instruments   N/A     Current Evaluation of Mental Status:     Constitutional       Vitals:    04/19/23 1410   BP: 126/89   Pulse: 80   Weight: 80 kg (176 lb 5.9 oz)   Height: 5' (1.524 m)     General:  casual dress, obese (Class I, BMI 30.0 - 34.9)    Psychiatric / Mental Status Examination  1. Appearance: Dress is informal but appropriate. Motor activity normal.  2. Discourse: Clear speech with normal rate and volume. Associations intact. Orderly.  3. Emotional Expression: Anxious and depressed mood. Affect is appropriate.  4. Perception and Thinking: No hallucinations. No suicidality, no homicidality, delusions, or paranoia.  5. Sensorium: Grossly intact. Able to focus for  "interview.  6. Memory and Fund of Knowledge: Intact for content of interview.  7. Insight and Judgment: Intact.    Neurological / Musculoskeletal / Osteopathic Structural  Gait, Station:  non-ataxic     Auto-populated chart data omitted from this note for brevity.    Billing Documentation:     Method of Encounter IN PERSON visit at the clinic   Type of Encounter Follow up visit with me   Counseling;  Psychotherapy Not applicable: Not done or UNDER 16 minutes   Counseling;  Tobacco and/or Nicotine Not applicable: Not done or UNDER 3 minutes   Additional Codes and Modifiers 77368: administered and scored more than one psychological or neuropsychological tests (PROMIS-A, PROMIS-D, PSS-4) (16 mins)   Time Remaining Chart/Pt 20101: FOLLOW UP VISIT, Rx mgmt, "Multiple STABLE chronic illnesses"   Total Mins  (4/19/2023) N/A - Not billing for time        Juno Valdovinos DO  Department of Psychiatry    "

## 2023-04-19 ENCOUNTER — OFFICE VISIT (OUTPATIENT)
Dept: PSYCHIATRY | Facility: CLINIC | Age: 53
End: 2023-04-19
Payer: MEDICAID

## 2023-04-19 VITALS
HEIGHT: 60 IN | DIASTOLIC BLOOD PRESSURE: 89 MMHG | SYSTOLIC BLOOD PRESSURE: 126 MMHG | HEART RATE: 80 BPM | BODY MASS INDEX: 34.63 KG/M2 | WEIGHT: 176.38 LBS

## 2023-04-19 DIAGNOSIS — F41.1 GENERALIZED ANXIETY DISORDER: ICD-10-CM

## 2023-04-19 DIAGNOSIS — F33.1 MODERATE EPISODE OF RECURRENT MAJOR DEPRESSIVE DISORDER: Primary | ICD-10-CM

## 2023-04-19 DIAGNOSIS — G47.00 INSOMNIA, UNSPECIFIED TYPE: ICD-10-CM

## 2023-04-19 PROCEDURE — 99213 OFFICE O/P EST LOW 20 MIN: CPT | Mod: PBBFAC,PN | Performed by: STUDENT IN AN ORGANIZED HEALTH CARE EDUCATION/TRAINING PROGRAM

## 2023-04-19 PROCEDURE — 4010F PR ACE/ARB THEARPY RXD/TAKEN: ICD-10-PCS | Mod: CPTII,,, | Performed by: STUDENT IN AN ORGANIZED HEALTH CARE EDUCATION/TRAINING PROGRAM

## 2023-04-19 PROCEDURE — 99214 OFFICE O/P EST MOD 30 MIN: CPT | Mod: S$PBB,AF,HB,25 | Performed by: STUDENT IN AN ORGANIZED HEALTH CARE EDUCATION/TRAINING PROGRAM

## 2023-04-19 PROCEDURE — 4010F ACE/ARB THERAPY RXD/TAKEN: CPT | Mod: CPTII,,, | Performed by: STUDENT IN AN ORGANIZED HEALTH CARE EDUCATION/TRAINING PROGRAM

## 2023-04-19 PROCEDURE — 96136 PR PSYCH/NEUROPSYCH TEST ADMIN/SCORING, 2+ TESTS, 1ST 30 MIN: ICD-10-PCS | Mod: AF,HB,, | Performed by: STUDENT IN AN ORGANIZED HEALTH CARE EDUCATION/TRAINING PROGRAM

## 2023-04-19 PROCEDURE — 99999 PR PBB SHADOW E&M-EST. PATIENT-LVL III: CPT | Mod: PBBFAC,AF,HB, | Performed by: STUDENT IN AN ORGANIZED HEALTH CARE EDUCATION/TRAINING PROGRAM

## 2023-04-19 PROCEDURE — 96136 PSYCL/NRPSYC TST PHY/QHP 1ST: CPT | Mod: AF,HB,, | Performed by: STUDENT IN AN ORGANIZED HEALTH CARE EDUCATION/TRAINING PROGRAM

## 2023-04-19 PROCEDURE — 3008F PR BODY MASS INDEX (BMI) DOCUMENTED: ICD-10-PCS | Mod: CPTII,,, | Performed by: STUDENT IN AN ORGANIZED HEALTH CARE EDUCATION/TRAINING PROGRAM

## 2023-04-19 PROCEDURE — 99999 PR PBB SHADOW E&M-EST. PATIENT-LVL III: ICD-10-PCS | Mod: PBBFAC,AF,HB, | Performed by: STUDENT IN AN ORGANIZED HEALTH CARE EDUCATION/TRAINING PROGRAM

## 2023-04-19 PROCEDURE — 3074F SYST BP LT 130 MM HG: CPT | Mod: CPTII,,, | Performed by: STUDENT IN AN ORGANIZED HEALTH CARE EDUCATION/TRAINING PROGRAM

## 2023-04-19 PROCEDURE — 1159F MED LIST DOCD IN RCRD: CPT | Mod: CPTII,,, | Performed by: STUDENT IN AN ORGANIZED HEALTH CARE EDUCATION/TRAINING PROGRAM

## 2023-04-19 PROCEDURE — 99214 PR OFFICE/OUTPT VISIT, EST, LEVL IV, 30-39 MIN: ICD-10-PCS | Mod: S$PBB,AF,HB,25 | Performed by: STUDENT IN AN ORGANIZED HEALTH CARE EDUCATION/TRAINING PROGRAM

## 2023-04-19 PROCEDURE — 3079F DIAST BP 80-89 MM HG: CPT | Mod: CPTII,,, | Performed by: STUDENT IN AN ORGANIZED HEALTH CARE EDUCATION/TRAINING PROGRAM

## 2023-04-19 PROCEDURE — 1160F PR REVIEW ALL MEDS BY PRESCRIBER/CLIN PHARMACIST DOCUMENTED: ICD-10-PCS | Mod: CPTII,,, | Performed by: STUDENT IN AN ORGANIZED HEALTH CARE EDUCATION/TRAINING PROGRAM

## 2023-04-19 PROCEDURE — 3079F PR MOST RECENT DIASTOLIC BLOOD PRESSURE 80-89 MM HG: ICD-10-PCS | Mod: CPTII,,, | Performed by: STUDENT IN AN ORGANIZED HEALTH CARE EDUCATION/TRAINING PROGRAM

## 2023-04-19 PROCEDURE — 1159F PR MEDICATION LIST DOCUMENTED IN MEDICAL RECORD: ICD-10-PCS | Mod: CPTII,,, | Performed by: STUDENT IN AN ORGANIZED HEALTH CARE EDUCATION/TRAINING PROGRAM

## 2023-04-19 PROCEDURE — 3008F BODY MASS INDEX DOCD: CPT | Mod: CPTII,,, | Performed by: STUDENT IN AN ORGANIZED HEALTH CARE EDUCATION/TRAINING PROGRAM

## 2023-04-19 PROCEDURE — 3074F PR MOST RECENT SYSTOLIC BLOOD PRESSURE < 130 MM HG: ICD-10-PCS | Mod: CPTII,,, | Performed by: STUDENT IN AN ORGANIZED HEALTH CARE EDUCATION/TRAINING PROGRAM

## 2023-04-19 PROCEDURE — 1160F RVW MEDS BY RX/DR IN RCRD: CPT | Mod: CPTII,,, | Performed by: STUDENT IN AN ORGANIZED HEALTH CARE EDUCATION/TRAINING PROGRAM

## 2023-04-19 RX ORDER — TRAZODONE HYDROCHLORIDE 150 MG/1
150 TABLET ORAL NIGHTLY
Qty: 30 TABLET | Refills: 2 | Status: SHIPPED | OUTPATIENT
Start: 2023-04-19 | End: 2023-05-11 | Stop reason: DRUGHIGH

## 2023-04-19 NOTE — PATIENT INSTRUCTIONS
Keep appointments with primary care and therapist    Continue VIIBRYD at current dose    Increase TRAZODONE to 150mg nightly

## 2023-05-09 NOTE — PROGRESS NOTES
Outpatient Psychiatry Followup Visit (DO/MD/NP)    5/11/2023    Assessment - Plan:     Diagnostic Impression     ICD-10-CM ICD-9-CM   1. Moderate episode of recurrent major depressive disorder  F33.1 296.32   2. Generalized anxiety disorder  F41.1 300.02   3. Insomnia, unspecified type  G47.00 780.52   4. BMI 34.0-34.9,adult  Z68.34 V85.34      5/11/2023 (3) Ongoing treatment of primary symptoms with some favorable progress.     Medication Management   Chart was reviewed. The risks and benefits of medication were discussed with pt. The treatment plan and followup plan were reviewed with pt. Pt understands to contact clinic if symptoms worsen. Pt understand to call 911 or go to nearest ER for suicidal ideation, intent or plan.   RX History CYMBALTA, EFFEXOR, LEXAPRO, TRAZODONE, TRINTELLIX, VIIBRYD and WELLBUTRIN   Current RX Increase TRAZODONE  To better address depression  Adjustments:  [id number]: Increase to 200mg HS  10LDS5610: Increase to 150mg HS  73VXX7983: Increase to 100mg HS  75COM3363: Increase to 50mg HS (scheduled)  Prior to evaluation pt had been taking 50mg HS PRN INSOMNIA  Continue VIIBRYD  Chosen for MDD (FDA approved), low risk for weight gain, poor concentration and fatigue, poor response to other drug classes.  Adjustments:  00ONS3653: Increase to 40mg daily  71XFS3109: Increase to 20mg daily  54WJS1026: Start 10mg daily  Prior to evaluation pt had been taking TRAZODONE   Education & Counseling RX administration and adherence   Other Orders Continue psychotherapy   Monitor VITAL SIGNS  Instruments: PROMIS (A&D), PSS4   RETURN L: RETURN IN 4 WEEKS (ONE MONTH), and reassess frequency two visits from now  CLINIC ONLY  Indications for CLINIC ONLY include MEASUREMENT BASED CARE, THE OVERALL CONDITION OF THE PATIENT IS NOT YET SUFFICIENTLY STABLE     Interval History - Review of Systems:     Patient did not display signs of nor endorse symptoms of overt psychosis or acute mood disorder requiring  hospitalization during the encounter. Patient denied violent thoughts or suicidal or homicidal ideation, intent, or plan.     Mirtha Stewart, a 53 y.o. female, presenting for followup visit. Pt is pleasant and cooperative on interview. This visit was done in person in the clinic.    Job search continues. Had nerve block and MRI. Financial strain continues. Continues workman's comp. Better energy and better sleep since TRAZODONE increase.    Mood is about the same.    Has hope for new job and working out.     Personal Functioning   Sleep Better.  Sleep onset is 30 mins or less.    Emotion About the same.  NEGATIVE BIAS/RUMINATION: about the same.   Appetite About the same.   Stress High.   Anxiety About the same.  RUMINATION: worrying about the same.   Cognition Overall no concerns.   Interpersonal Functioning   Home    Peers about the same   Work N/A     Measurement-Based Care (MBC):     Routine Instruments   PROMIS-ANXIETY Interpretation: T-SCORE 70+; SEVERE using 55-60-70 cutoffs. Compared to SEVERE last time, PROMIS-ANXIETY shows NO significant change.   PROMIS-DEPRESSION Interpretation: T-SCORE 70+; SEVERE using 55-60-70 cutoffs. Compared to MODERATE (69) last time, PROMIS-DEPRESSION WORSENED.   PSS4 Interpretation: 14/16; HIGH using 6-11 cutoffs. 2 PH, 1 LSE. Compared to HIGH 14/16 last time, PSS4 shows NO significant change.   Additional Instruments   N/A     Current Evaluation of Mental Status:     Constitutional       Vitals:    05/11/23 1011   BP: 125/87   Pulse: 73   Weight: 81 kg (178 lb 9.2 oz)   Height: 5' (1.524 m)     General:  casual dress, obese (Class I, BMI 30.0 - 34.9)    Psychiatric / Mental Status Examination  1. Appearance: Dress is informal but appropriate. Motor activity normal.  2. Discourse: Clear speech with normal rate and volume. Associations intact. Orderly.  3. Emotional Expression: Anxious and depressed mood. Affect is appropriate.  4. Perception and Thinking: No  "hallucinations. No suicidality, no homicidality, delusions, or paranoia.  5. Sensorium: Grossly intact. Able to focus for interview.  6. Memory and Fund of Knowledge: Intact for content of interview.  7. Insight and Judgment: Intact.    Neurological / Musculoskeletal / Osteopathic Structural  Gait, Station:  non-ataxic     Auto-populated chart data omitted from this note for brevity.    Billing Documentation:     Method of Encounter IN PERSON visit at the clinic   Type of Encounter Follow up visit with me   Counseling;  Psychotherapy Not applicable: Not done or UNDER 16 minutes   Counseling;  Tobacco and/or Nicotine Not applicable: Not done or UNDER 3 minutes   Additional Codes and Modifiers 70204: administered and scored more than one psychological or neuropsychological tests (see MBC above) (16+ mins)   Time Remaining Chart/Pt 05855: FOLLOW UP VISIT, Rx mgmt, "Multiple STABLE chronic illnesses"   Total Mins  (5/11/2023) N/A - Not billing for time        Juno Valdovinos DO  Department of Psychiatry    "

## 2023-05-11 ENCOUNTER — OFFICE VISIT (OUTPATIENT)
Dept: PSYCHIATRY | Facility: CLINIC | Age: 53
End: 2023-05-11
Payer: MEDICAID

## 2023-05-11 VITALS
BODY MASS INDEX: 35.05 KG/M2 | WEIGHT: 178.56 LBS | DIASTOLIC BLOOD PRESSURE: 87 MMHG | SYSTOLIC BLOOD PRESSURE: 125 MMHG | HEIGHT: 60 IN | HEART RATE: 73 BPM

## 2023-05-11 DIAGNOSIS — F41.1 GENERALIZED ANXIETY DISORDER: ICD-10-CM

## 2023-05-11 DIAGNOSIS — F33.1 MODERATE EPISODE OF RECURRENT MAJOR DEPRESSIVE DISORDER: Primary | ICD-10-CM

## 2023-05-11 DIAGNOSIS — F43.21 ADJUSTMENT DISORDER WITH DEPRESSED MOOD: ICD-10-CM

## 2023-05-11 DIAGNOSIS — G47.00 INSOMNIA, UNSPECIFIED TYPE: ICD-10-CM

## 2023-05-11 PROCEDURE — 99999 PR PBB SHADOW E&M-EST. PATIENT-LVL III: ICD-10-PCS | Mod: PBBFAC,AF,HB, | Performed by: STUDENT IN AN ORGANIZED HEALTH CARE EDUCATION/TRAINING PROGRAM

## 2023-05-11 PROCEDURE — 90837 PSYTX W PT 60 MINUTES: CPT | Mod: AJ,HB,, | Performed by: SOCIAL WORKER

## 2023-05-11 PROCEDURE — 1159F MED LIST DOCD IN RCRD: CPT | Mod: AJ,HB,CPTII, | Performed by: SOCIAL WORKER

## 2023-05-11 PROCEDURE — 96136 PSYCL/NRPSYC TST PHY/QHP 1ST: CPT | Mod: AF,HB,59, | Performed by: STUDENT IN AN ORGANIZED HEALTH CARE EDUCATION/TRAINING PROGRAM

## 2023-05-11 PROCEDURE — 3079F PR MOST RECENT DIASTOLIC BLOOD PRESSURE 80-89 MM HG: ICD-10-PCS | Mod: AF,HB,CPTII, | Performed by: STUDENT IN AN ORGANIZED HEALTH CARE EDUCATION/TRAINING PROGRAM

## 2023-05-11 PROCEDURE — 99999 PR PBB SHADOW E&M-EST. PATIENT-LVL III: CPT | Mod: PBBFAC,AF,HB, | Performed by: STUDENT IN AN ORGANIZED HEALTH CARE EDUCATION/TRAINING PROGRAM

## 2023-05-11 PROCEDURE — 1159F PR MEDICATION LIST DOCUMENTED IN MEDICAL RECORD: ICD-10-PCS | Mod: AF,HB,CPTII, | Performed by: STUDENT IN AN ORGANIZED HEALTH CARE EDUCATION/TRAINING PROGRAM

## 2023-05-11 PROCEDURE — 3074F PR MOST RECENT SYSTOLIC BLOOD PRESSURE < 130 MM HG: ICD-10-PCS | Mod: AF,HB,CPTII, | Performed by: STUDENT IN AN ORGANIZED HEALTH CARE EDUCATION/TRAINING PROGRAM

## 2023-05-11 PROCEDURE — 3074F SYST BP LT 130 MM HG: CPT | Mod: AF,HB,CPTII, | Performed by: STUDENT IN AN ORGANIZED HEALTH CARE EDUCATION/TRAINING PROGRAM

## 2023-05-11 PROCEDURE — 96136 PR PSYCH/NEUROPSYCH TEST ADMIN/SCORING, 2+ TESTS, 1ST 30 MIN: ICD-10-PCS | Mod: AF,HB,59, | Performed by: STUDENT IN AN ORGANIZED HEALTH CARE EDUCATION/TRAINING PROGRAM

## 2023-05-11 PROCEDURE — 1159F PR MEDICATION LIST DOCUMENTED IN MEDICAL RECORD: ICD-10-PCS | Mod: AJ,HB,CPTII, | Performed by: SOCIAL WORKER

## 2023-05-11 PROCEDURE — 1160F PR REVIEW ALL MEDS BY PRESCRIBER/CLIN PHARMACIST DOCUMENTED: ICD-10-PCS | Mod: AF,HB,CPTII, | Performed by: STUDENT IN AN ORGANIZED HEALTH CARE EDUCATION/TRAINING PROGRAM

## 2023-05-11 PROCEDURE — 1160F RVW MEDS BY RX/DR IN RCRD: CPT | Mod: AF,HB,CPTII, | Performed by: STUDENT IN AN ORGANIZED HEALTH CARE EDUCATION/TRAINING PROGRAM

## 2023-05-11 PROCEDURE — 4010F PR ACE/ARB THEARPY RXD/TAKEN: ICD-10-PCS | Mod: AF,HB,CPTII, | Performed by: STUDENT IN AN ORGANIZED HEALTH CARE EDUCATION/TRAINING PROGRAM

## 2023-05-11 PROCEDURE — 99213 OFFICE O/P EST LOW 20 MIN: CPT | Mod: PBBFAC,PN | Performed by: STUDENT IN AN ORGANIZED HEALTH CARE EDUCATION/TRAINING PROGRAM

## 2023-05-11 PROCEDURE — 99999 PR PBB SHADOW E&M-EST. PATIENT-LVL I: ICD-10-PCS | Mod: PBBFAC,AJ,HB, | Performed by: SOCIAL WORKER

## 2023-05-11 PROCEDURE — 4010F ACE/ARB THERAPY RXD/TAKEN: CPT | Mod: AJ,HB,CPTII, | Performed by: SOCIAL WORKER

## 2023-05-11 PROCEDURE — 99211 OFF/OP EST MAY X REQ PHY/QHP: CPT | Mod: PBBFAC,27,PN | Performed by: SOCIAL WORKER

## 2023-05-11 PROCEDURE — 99214 OFFICE O/P EST MOD 30 MIN: CPT | Mod: S$PBB,AF,HB, | Performed by: STUDENT IN AN ORGANIZED HEALTH CARE EDUCATION/TRAINING PROGRAM

## 2023-05-11 PROCEDURE — 3079F DIAST BP 80-89 MM HG: CPT | Mod: AF,HB,CPTII, | Performed by: STUDENT IN AN ORGANIZED HEALTH CARE EDUCATION/TRAINING PROGRAM

## 2023-05-11 PROCEDURE — 3008F PR BODY MASS INDEX (BMI) DOCUMENTED: ICD-10-PCS | Mod: AF,HB,CPTII, | Performed by: STUDENT IN AN ORGANIZED HEALTH CARE EDUCATION/TRAINING PROGRAM

## 2023-05-11 PROCEDURE — 90837 PR PSYCHOTHERAPY W/PATIENT, 60 MIN: ICD-10-PCS | Mod: AJ,HB,, | Performed by: SOCIAL WORKER

## 2023-05-11 PROCEDURE — 3008F BODY MASS INDEX DOCD: CPT | Mod: AF,HB,CPTII, | Performed by: STUDENT IN AN ORGANIZED HEALTH CARE EDUCATION/TRAINING PROGRAM

## 2023-05-11 PROCEDURE — 99214 PR OFFICE/OUTPT VISIT, EST, LEVL IV, 30-39 MIN: ICD-10-PCS | Mod: S$PBB,AF,HB, | Performed by: STUDENT IN AN ORGANIZED HEALTH CARE EDUCATION/TRAINING PROGRAM

## 2023-05-11 PROCEDURE — 4010F ACE/ARB THERAPY RXD/TAKEN: CPT | Mod: AF,HB,CPTII, | Performed by: STUDENT IN AN ORGANIZED HEALTH CARE EDUCATION/TRAINING PROGRAM

## 2023-05-11 PROCEDURE — 99999 PR PBB SHADOW E&M-EST. PATIENT-LVL I: CPT | Mod: PBBFAC,AJ,HB, | Performed by: SOCIAL WORKER

## 2023-05-11 PROCEDURE — 4010F PR ACE/ARB THEARPY RXD/TAKEN: ICD-10-PCS | Mod: AJ,HB,CPTII, | Performed by: SOCIAL WORKER

## 2023-05-11 PROCEDURE — 1159F MED LIST DOCD IN RCRD: CPT | Mod: AF,HB,CPTII, | Performed by: STUDENT IN AN ORGANIZED HEALTH CARE EDUCATION/TRAINING PROGRAM

## 2023-05-11 RX ORDER — DICLOFENAC SODIUM 30 MG/G
1 GEL TOPICAL 2 TIMES DAILY
COMMUNITY
Start: 2023-05-05

## 2023-05-11 RX ORDER — VILAZODONE HYDROCHLORIDE 40 MG/1
40 TABLET ORAL DAILY
Qty: 90 TABLET | Refills: 1 | Status: SHIPPED | OUTPATIENT
Start: 2023-05-11 | End: 2023-06-12 | Stop reason: SDUPTHER

## 2023-05-11 RX ORDER — TRAZODONE HYDROCHLORIDE 100 MG/1
200 TABLET ORAL NIGHTLY
Qty: 60 TABLET | Refills: 1 | Status: SHIPPED | OUTPATIENT
Start: 2023-05-11 | End: 2023-06-12 | Stop reason: DRUGHIGH

## 2023-05-16 NOTE — PROGRESS NOTES
Individual Psychotherapy (PhD/LCSW)    5/11/2023    Site:  [facility]         Therapeutic Intervention: Met with patient.  Outpatient - Insight oriented psychotherapy 60 min - CPT code 91035, Outpatient - Behavior modifying psychotherapy 60 min - CPT code 51284, and Outpatient - Supportive psychotherapy 60 min - CPT Code 88628    Chief complaint/reason for encounter: depression and anxiety             Interval history and content of current session: Ct was referred to tx by Dr. Valdovinos to address depression and anxiety. Ct arrived to session and was fully engaged. Ct shared that things have been the same. She still has concerns about her knee and that the pain is still there but no one can find anything wrong with it. Ct shared that she is trying to come to terms with that. She shared about her concerns about not finding work due to her physical limitation and the financial stress that comes along with that. Ct shared her concerns about one of her daughters and how that is a stressor for her as she wants what's best for her kids. Ct discussed how her life has been upended by her injury and that she is trying to do the best she can. SW and ct processed the importance of self care, a schedule and structure to help ct combat depression and anxiety. Ct shared that she tries to help her mom out with her great niece when she can and that she tries to do things that won't extend her injury. Ct to continue in 1:1 sessions.       Treatment plan:  Target symptoms: depression, anxiety   Why chosen therapy is appropriate versus another modality: relevant to diagnosis, patient responds to this modality, evidence based practice  Outcome monitoring methods: self-report  Therapeutic intervention type: insight oriented psychotherapy, behavior modifying psychotherapy, supportive psychotherapy    Risk parameters:  Patient reports no suicidal ideation  Patient reports no homicidal ideation  Patient reports no self-injurious  behavior  Patient reports no violent behavior    CSSRS was completed:     Mental Status Exam:  General Appearance:  unremarkable, age appropriate   Speech: normal tone, normal rate, normal pitch, normal volume      Level of Cooperation: cooperative      Thought Processes: normal and logical   Mood: steady      Thought Content: normal, no suicidality, no homicidality, delusions, or paranoia   Affect: congruent and appropriate   Orientation: Oriented x3   Memory: recent >  intact   Attention Span & Concentration: intact   Fund of General Knowledge: intact and appropriate to age and level of education   Abstract Reasoning: interpretation of similarities was abstract   Judgment & Insight: intact     Language intact       Verbal deficits: None    Patient's response to intervention:  The patient's response to intervention is accepting.    Progress toward goals and other mental status changes:  The patient's progress toward goals is fair .    Diagnosis:     ICD-10-CM ICD-9-CM   1. Moderate episode of recurrent major depressive disorder  F33.1 296.32   2. Generalized anxiety disorder  F41.1 300.02   3. Adjustment disorder with depressed mood  F43.21 309.0       Plan:  individual psychotherapy and ct to follow up with Dr. Valdovinos for psych med mgt  Pt to go to ED or call 911 if symptoms worsen or if she has thoughts of harming self and/or others. Pt verbalized understanding.    Return to clinic: as scheduled    Length of Service (minutes): 60      Each patient to whom he or she provides medical services by telemedicine is: (1) informed of the relationship between the physician and patient and the respective role of any other health care provider with respect to management of the patient; and (2) notified that he or she may decline to receive medical services by telemedicine and may withdraw from such care at any time.

## 2023-06-12 ENCOUNTER — OFFICE VISIT (OUTPATIENT)
Dept: PSYCHIATRY | Facility: CLINIC | Age: 53
End: 2023-06-12
Payer: MEDICAID

## 2023-06-12 VITALS
WEIGHT: 175.81 LBS | SYSTOLIC BLOOD PRESSURE: 128 MMHG | BODY MASS INDEX: 34.52 KG/M2 | DIASTOLIC BLOOD PRESSURE: 83 MMHG | HEART RATE: 81 BPM | HEIGHT: 60 IN

## 2023-06-12 DIAGNOSIS — G47.00 INSOMNIA, UNSPECIFIED TYPE: ICD-10-CM

## 2023-06-12 DIAGNOSIS — F33.2 SEVERE EPISODE OF RECURRENT MAJOR DEPRESSIVE DISORDER, WITHOUT PSYCHOTIC FEATURES: Primary | ICD-10-CM

## 2023-06-12 DIAGNOSIS — F41.1 GENERALIZED ANXIETY DISORDER: ICD-10-CM

## 2023-06-12 PROCEDURE — 96136 PSYCL/NRPSYC TST PHY/QHP 1ST: CPT | Mod: 59,AF,HB, | Performed by: STUDENT IN AN ORGANIZED HEALTH CARE EDUCATION/TRAINING PROGRAM

## 2023-06-12 PROCEDURE — 4010F ACE/ARB THERAPY RXD/TAKEN: CPT | Mod: AF,HB,CPTII, | Performed by: STUDENT IN AN ORGANIZED HEALTH CARE EDUCATION/TRAINING PROGRAM

## 2023-06-12 PROCEDURE — 3079F PR MOST RECENT DIASTOLIC BLOOD PRESSURE 80-89 MM HG: ICD-10-PCS | Mod: AF,HB,CPTII, | Performed by: STUDENT IN AN ORGANIZED HEALTH CARE EDUCATION/TRAINING PROGRAM

## 2023-06-12 PROCEDURE — 4010F PR ACE/ARB THEARPY RXD/TAKEN: ICD-10-PCS | Mod: AF,HB,CPTII, | Performed by: STUDENT IN AN ORGANIZED HEALTH CARE EDUCATION/TRAINING PROGRAM

## 2023-06-12 PROCEDURE — 1159F MED LIST DOCD IN RCRD: CPT | Mod: AF,HB,CPTII, | Performed by: STUDENT IN AN ORGANIZED HEALTH CARE EDUCATION/TRAINING PROGRAM

## 2023-06-12 PROCEDURE — 1160F PR REVIEW ALL MEDS BY PRESCRIBER/CLIN PHARMACIST DOCUMENTED: ICD-10-PCS | Mod: AF,HB,CPTII, | Performed by: STUDENT IN AN ORGANIZED HEALTH CARE EDUCATION/TRAINING PROGRAM

## 2023-06-12 PROCEDURE — 3008F PR BODY MASS INDEX (BMI) DOCUMENTED: ICD-10-PCS | Mod: AF,HB,CPTII, | Performed by: STUDENT IN AN ORGANIZED HEALTH CARE EDUCATION/TRAINING PROGRAM

## 2023-06-12 PROCEDURE — 96136 PR PSYCH/NEUROPSYCH TEST ADMIN/SCORING, 2+ TESTS, 1ST 30 MIN: ICD-10-PCS | Mod: 59,AF,HB, | Performed by: STUDENT IN AN ORGANIZED HEALTH CARE EDUCATION/TRAINING PROGRAM

## 2023-06-12 PROCEDURE — 99999 PR PBB SHADOW E&M-EST. PATIENT-LVL III: CPT | Mod: PBBFAC,AF,HB, | Performed by: STUDENT IN AN ORGANIZED HEALTH CARE EDUCATION/TRAINING PROGRAM

## 2023-06-12 PROCEDURE — 99213 OFFICE O/P EST LOW 20 MIN: CPT | Mod: PBBFAC,PN | Performed by: STUDENT IN AN ORGANIZED HEALTH CARE EDUCATION/TRAINING PROGRAM

## 2023-06-12 PROCEDURE — 99214 OFFICE O/P EST MOD 30 MIN: CPT | Mod: S$PBB,AF,HB, | Performed by: STUDENT IN AN ORGANIZED HEALTH CARE EDUCATION/TRAINING PROGRAM

## 2023-06-12 PROCEDURE — 1159F PR MEDICATION LIST DOCUMENTED IN MEDICAL RECORD: ICD-10-PCS | Mod: AF,HB,CPTII, | Performed by: STUDENT IN AN ORGANIZED HEALTH CARE EDUCATION/TRAINING PROGRAM

## 2023-06-12 PROCEDURE — 99214 PR OFFICE/OUTPT VISIT, EST, LEVL IV, 30-39 MIN: ICD-10-PCS | Mod: S$PBB,AF,HB, | Performed by: STUDENT IN AN ORGANIZED HEALTH CARE EDUCATION/TRAINING PROGRAM

## 2023-06-12 PROCEDURE — 3074F PR MOST RECENT SYSTOLIC BLOOD PRESSURE < 130 MM HG: ICD-10-PCS | Mod: AF,HB,CPTII, | Performed by: STUDENT IN AN ORGANIZED HEALTH CARE EDUCATION/TRAINING PROGRAM

## 2023-06-12 PROCEDURE — 3079F DIAST BP 80-89 MM HG: CPT | Mod: AF,HB,CPTII, | Performed by: STUDENT IN AN ORGANIZED HEALTH CARE EDUCATION/TRAINING PROGRAM

## 2023-06-12 PROCEDURE — 3074F SYST BP LT 130 MM HG: CPT | Mod: AF,HB,CPTII, | Performed by: STUDENT IN AN ORGANIZED HEALTH CARE EDUCATION/TRAINING PROGRAM

## 2023-06-12 PROCEDURE — 3008F BODY MASS INDEX DOCD: CPT | Mod: AF,HB,CPTII, | Performed by: STUDENT IN AN ORGANIZED HEALTH CARE EDUCATION/TRAINING PROGRAM

## 2023-06-12 PROCEDURE — 99999 PR PBB SHADOW E&M-EST. PATIENT-LVL III: ICD-10-PCS | Mod: PBBFAC,AF,HB, | Performed by: STUDENT IN AN ORGANIZED HEALTH CARE EDUCATION/TRAINING PROGRAM

## 2023-06-12 PROCEDURE — 1160F RVW MEDS BY RX/DR IN RCRD: CPT | Mod: AF,HB,CPTII, | Performed by: STUDENT IN AN ORGANIZED HEALTH CARE EDUCATION/TRAINING PROGRAM

## 2023-06-12 RX ORDER — TRAZODONE HYDROCHLORIDE 100 MG/1
TABLET ORAL
Qty: 30 TABLET | Refills: 1 | Status: SHIPPED | OUTPATIENT
Start: 2023-06-12 | End: 2023-07-12 | Stop reason: DRUGHIGH

## 2023-06-12 RX ORDER — VILAZODONE HYDROCHLORIDE 40 MG/1
40 TABLET ORAL DAILY
Qty: 90 TABLET | Refills: 1 | Status: SHIPPED | OUTPATIENT
Start: 2023-06-12 | End: 2023-07-12 | Stop reason: DRUGHIGH

## 2023-06-12 RX ORDER — TRAZODONE HYDROCHLORIDE 150 MG/1
TABLET ORAL
Qty: 30 TABLET | Refills: 1 | Status: SHIPPED | OUTPATIENT
Start: 2023-06-12 | End: 2023-07-12 | Stop reason: DRUGHIGH

## 2023-06-12 NOTE — PROGRESS NOTES
Outpatient Psychiatry Followup Visit (DO/MD/NP)    6/12/2023    Assessment - Plan:     Diagnostic Impression     ICD-10-CM ICD-9-CM   1. Severe episode of recurrent major depressive disorder, without psychotic features  F33.2 296.33   2. Generalized anxiety disorder  F41.1 300.02   3. Insomnia, unspecified type  G47.00 780.52   4. BMI 34.0-34.9,adult  Z68.34 V85.34      6/12/2023 (4B) Exacerbation of symptoms of illness.     Medication Management   Chart was reviewed. The risks and benefits of medication were discussed with pt. The treatment plan and followup plan were reviewed with pt. Pt understands to contact clinic if symptoms worsen. Pt understands to call 911 or go to nearest ER for suicidal ideation, intent or plan.   RX History CYMBALTA, EFFEXOR, LEXAPRO, TRAZODONE, TRINTELLIX, VIIBRYD and WELLBUTRIN   Current RX Increase TRAZODONE  To better address depression  Adjustments:  [id number]: Increase to 250mg HS  53TKG2043: Increase to 200mg HS  29JFA7753: Increase to 150mg HS  21MZI5222: Increase to 100mg HS  04ZBQ7898: Increase to 50mg HS (scheduled)  Prior to evaluation pt had been taking 50mg HS PRN INSOMNIA  Continue VIIBRYD  Chosen for MDD (FDA approved), low risk for weight gain, poor concentration and fatigue, poor response to other drug classes.  Adjustments:  04WIZ4844: Increase to 40mg daily  66CNR8369: Increase to 20mg daily  68DZL6110: Start 10mg daily  Prior to evaluation pt had been taking TRAZODONE   Education & Counseling RX administration and adherence   Other Orders Continue individual psychotherapy   Monitor VITAL SIGNS  Instruments: PROMIS (A&D), PSS4   RETURN M: RETURN IN 4 WEEKS (ONE MONTH), and reassess frequency next visit  Continue medication management.     Interval History - Review of Systems:     Available documentation has been reviewed, and pertinent elements of the chart have been incorporated into this note where appropriate.   1/18/2023 : first [facility] encounter with psychiatry  department  5/11/2023 : last Epic encounter with psychiatry department  5/11/2023 : last Epic encounter with writer   Mirtha Stewart, a 53 y.o. female, presenting for followup visit.      Job search continues. Likes psychotherapy. Pain bothersome. Financial strain. Workman's comp is a problem.    Depression might be a bit worse.    Discussed increasing TRAZODONE from 200mg to 250mg HS.     Vegetative Functions   Sleep [] Y  [x] N  Sleep onset is 30 mins or less.   Duration is 6 hours with 1 or 2 interruptions due to dreams, bathroom needs and spontaneous awakenings.   GI/ [] Y  [] N     Appetite [] Y  [] N     Emotion and Mood   Negative Bias-Rumination [x] Y  [] N  NEGATIVE BIAS/RUMINATION: about the same.   Anhed./Context Insens. [x] Y  [] N  ANHEDONIA/CONTEXT INSENSITIVITY: about the same.   Mood Dysregulation [] Y  [x] N  MOOD DYSREGULATION: crying spells noted.   Anxiety and Threat Perception   Rumination [x] Y  [] N  About the same.   Salience-Anxious Av. [] Y  [] N     Threat Dysregulation [] Y  [] N     Consciousness, Cognition and Reality Testing   Cognitive Dyscontrol [] Y  [] N     Inattention [] Y  [] N     Memory Problems [] Y  [] N     Perception Problems [] Y  [] N     Problems in Social Spheres   Home [] Y  [] N     Peers [] Y  [] N     Work [] Y  [] N     Stress [x] Y  [] N  Financial Strain.     Pt did NOT display signs of nor endorse symptoms of overt psychosis or acute mood disorder requiring hospitalization during the encounter. Pt denied violent thoughts or suicidal or homicidal ideation, intent, or plan.       Measurement-Based Care (MBC):     Routine Instruments   PROMIS-ANXIETY Interpretation: T-SCORE 69; MODERATE using 55-60-70 cutoffs. Compared to SEVERE (70+) last time, PROMIS-ANXIETY IMPROVED.   PROMIS-DEPRESSION Interpretation: T-SCORE 70+; SEVERE using 55-60-70 cutoffs. Compared to SEVERE (70+) last time,  "PROMIS-DEPRESSION shows NO significant change.   PSS4 Interpretation: 12/16; HIGH using 6-11 cutoffs. 2 PH, 1 LSE. Compared to HIGH last time, PSS4 shows NO significant change.   Additional Instruments   N/A     Current Evaluation of Mental Status:     Constitutional / General       Vitals:    06/12/23 1418   BP: 128/83   Pulse: 81   Weight: 79.8 kg (175 lb 13.1 oz)   Height: 5' (1.524 m)     casual dress, obese (Class I, BMI 30.0 - 34.9)    Psychiatric / Mental Status Examination  1. Appearance: Dress is informal but appropriate. Motor activity normal.  2. Discourse: Clear speech with normal rate and volume. Associations intact. Orderly.  3. Emotional Expression: Somewhat anxious and depressed mood. Affect is appropriate.  4. Perception and Thinking: No hallucinations. No suicidality, no homicidality, delusions, or paranoia.  5. Sensorium: Grossly intact. Able to focus for interview.  6. Memory and Fund of Knowledge: Intact for content of interview.  7. Insight and Judgment: Intact.             Billing Documentation:     Method of Encounter IN PERSON visit at the clinic   Type of Encounter Follow up visit with me   Counseling;  Psychotherapy Not applicable: Not done or UNDER 16 minutes   Counseling;  Tobacco and/or Nicotine Not applicable: Not done or UNDER 3 minutes   Additional Codes and Modifiers 97656, with modifer 59: administered and scored more than one psychological or neuropsychological tests (see MBC above) (16+ mins)   Time Remaining Chart/Pt 30955: FOLLOW UP VISIT, Rx mgmt, "UNSTABLE chronic illness(es) which require(s) a change in treatment"   Total Mins  (6/12/2023) N/A - Not billing for time        Juno Valdovinos DO  Department of Psychiatry, [facility]        "

## 2023-06-27 ENCOUNTER — OFFICE VISIT (OUTPATIENT)
Dept: PSYCHIATRY | Facility: CLINIC | Age: 53
End: 2023-06-27
Payer: MEDICAID

## 2023-06-27 DIAGNOSIS — F43.21 ADJUSTMENT DISORDER WITH DEPRESSED MOOD: ICD-10-CM

## 2023-06-27 DIAGNOSIS — F41.1 GENERALIZED ANXIETY DISORDER: Primary | ICD-10-CM

## 2023-06-27 PROCEDURE — 99999 PR PBB SHADOW E&M-EST. PATIENT-LVL I: CPT | Mod: PBBFAC,AJ,HB, | Performed by: SOCIAL WORKER

## 2023-06-27 PROCEDURE — 4010F PR ACE/ARB THEARPY RXD/TAKEN: ICD-10-PCS | Mod: AJ,HB,CPTII, | Performed by: SOCIAL WORKER

## 2023-06-27 PROCEDURE — 1159F PR MEDICATION LIST DOCUMENTED IN MEDICAL RECORD: ICD-10-PCS | Mod: AJ,HB,CPTII, | Performed by: SOCIAL WORKER

## 2023-06-27 PROCEDURE — 99211 OFF/OP EST MAY X REQ PHY/QHP: CPT | Mod: PBBFAC,PN | Performed by: SOCIAL WORKER

## 2023-06-27 PROCEDURE — 90834 PSYTX W PT 45 MINUTES: CPT | Mod: AJ,HB,, | Performed by: SOCIAL WORKER

## 2023-06-27 PROCEDURE — 90834 PR PSYCHOTHERAPY W/PATIENT, 45 MIN: ICD-10-PCS | Mod: AJ,HB,, | Performed by: SOCIAL WORKER

## 2023-06-27 PROCEDURE — 99999 PR PBB SHADOW E&M-EST. PATIENT-LVL I: ICD-10-PCS | Mod: PBBFAC,AJ,HB, | Performed by: SOCIAL WORKER

## 2023-06-27 PROCEDURE — 1159F MED LIST DOCD IN RCRD: CPT | Mod: AJ,HB,CPTII, | Performed by: SOCIAL WORKER

## 2023-06-27 PROCEDURE — 4010F ACE/ARB THERAPY RXD/TAKEN: CPT | Mod: AJ,HB,CPTII, | Performed by: SOCIAL WORKER

## 2023-06-30 NOTE — PROGRESS NOTES
Individual Psychotherapy (PhD/LCSW)    6/27/2023    Site:  [facility]         Therapeutic Intervention: Met with patient.  Outpatient - Insight oriented psychotherapy 45 min - CPT code 62133, Outpatient - Behavior modifying psychotherapy 45 min - CPT code 59759, and Outpatient - Supportive psychotherapy 45 min - CPT Code 72850    Chief complaint/reason for encounter: depression and anxiety             Interval history and content of current session: Ct was referred to tx by Dr. Valdovinos to address depression and anxiety. Ct arrived to session and was fully engaged. Ct shared that things have been the same. She reported that she has been feeling more depressed as she looks at her financial situation.  Ct shared that her parents have been supportive and want her to move with them. Ct shared that she does not want to leave her home. SW and ct discussed the idea of ct just considering what that could possibly look like if the situation were dire enough. SW and ct discussed the importance of ct doing self care. She reported that she has accepted the fact that she will have chronic pain in her right leg. She does what she can within her limitations. SW provided ct resources for financial help. Ct to continue in 1:1 sessions.       Treatment plan:  Target symptoms: depression, anxiety   Why chosen therapy is appropriate versus another modality: relevant to diagnosis, patient responds to this modality, evidence based practice  Outcome monitoring methods: self-report  Therapeutic intervention type: insight oriented psychotherapy, behavior modifying psychotherapy, supportive psychotherapy    Risk parameters:  Patient reports no suicidal ideation  Patient reports no homicidal ideation  Patient reports no self-injurious behavior  Patient reports no violent behavior    CSSRS was completed:     Mental Status Exam:  General Appearance:  unremarkable, age appropriate   Speech: normal tone, normal rate, normal pitch, normal volume       Level of Cooperation: cooperative      Thought Processes: normal and logical   Mood: anxious      Thought Content: normal, no suicidality, no homicidality, delusions, or paranoia   Affect: congruent and appropriate   Orientation: Oriented x3   Memory: recent >  intact   Attention Span & Concentration: intact   Fund of General Knowledge: intact and appropriate to age and level of education   Abstract Reasoning: interpretation of similarities was abstract   Judgment & Insight: intact     Language intact       Verbal deficits: None    Patient's response to intervention:  The patient's response to intervention is accepting.    Progress toward goals and other mental status changes:  The patient's progress toward goals is fair .    Diagnosis:     ICD-10-CM ICD-9-CM   1. Generalized anxiety disorder  F41.1 300.02   2. Adjustment disorder with depressed mood  F43.21 309.0       Plan:  individual psychotherapy and ct to follow up with Dr. Valdovinos for psych med mgt  Pt to go to ED or call 911 if symptoms worsen or if she has thoughts of harming self and/or others. Pt verbalized understanding.    Return to clinic: as scheduled    Length of Service (minutes): 45      Each patient to whom he or she provides medical services by telemedicine is: (1) informed of the relationship between the physician and patient and the respective role of any other health care provider with respect to management of the patient; and (2) notified that he or she may decline to receive medical services by telemedicine and may withdraw from such care at any time.

## 2023-07-12 ENCOUNTER — OFFICE VISIT (OUTPATIENT)
Dept: PSYCHIATRY | Facility: CLINIC | Age: 53
End: 2023-07-12
Payer: MEDICAID

## 2023-07-12 VITALS
DIASTOLIC BLOOD PRESSURE: 80 MMHG | BODY MASS INDEX: 34.58 KG/M2 | HEIGHT: 60 IN | HEART RATE: 79 BPM | WEIGHT: 176.13 LBS | SYSTOLIC BLOOD PRESSURE: 113 MMHG

## 2023-07-12 DIAGNOSIS — G47.00 INSOMNIA, UNSPECIFIED TYPE: ICD-10-CM

## 2023-07-12 DIAGNOSIS — F33.2 SEVERE EPISODE OF RECURRENT MAJOR DEPRESSIVE DISORDER, WITHOUT PSYCHOTIC FEATURES: Primary | ICD-10-CM

## 2023-07-12 DIAGNOSIS — F41.1 GENERALIZED ANXIETY DISORDER: ICD-10-CM

## 2023-07-12 PROCEDURE — 3074F PR MOST RECENT SYSTOLIC BLOOD PRESSURE < 130 MM HG: ICD-10-PCS | Mod: AF,HB,CPTII, | Performed by: STUDENT IN AN ORGANIZED HEALTH CARE EDUCATION/TRAINING PROGRAM

## 2023-07-12 PROCEDURE — 96136 PSYCL/NRPSYC TST PHY/QHP 1ST: CPT | Mod: 59,AF,HB, | Performed by: STUDENT IN AN ORGANIZED HEALTH CARE EDUCATION/TRAINING PROGRAM

## 2023-07-12 PROCEDURE — 3008F BODY MASS INDEX DOCD: CPT | Mod: AF,HB,CPTII, | Performed by: STUDENT IN AN ORGANIZED HEALTH CARE EDUCATION/TRAINING PROGRAM

## 2023-07-12 PROCEDURE — 99999 PR PBB SHADOW E&M-EST. PATIENT-LVL III: CPT | Mod: PBBFAC,AF,HB, | Performed by: STUDENT IN AN ORGANIZED HEALTH CARE EDUCATION/TRAINING PROGRAM

## 2023-07-12 PROCEDURE — 1159F PR MEDICATION LIST DOCUMENTED IN MEDICAL RECORD: ICD-10-PCS | Mod: AF,HB,CPTII, | Performed by: STUDENT IN AN ORGANIZED HEALTH CARE EDUCATION/TRAINING PROGRAM

## 2023-07-12 PROCEDURE — 3008F PR BODY MASS INDEX (BMI) DOCUMENTED: ICD-10-PCS | Mod: AF,HB,CPTII, | Performed by: STUDENT IN AN ORGANIZED HEALTH CARE EDUCATION/TRAINING PROGRAM

## 2023-07-12 PROCEDURE — 4010F PR ACE/ARB THEARPY RXD/TAKEN: ICD-10-PCS | Mod: AF,HB,CPTII, | Performed by: STUDENT IN AN ORGANIZED HEALTH CARE EDUCATION/TRAINING PROGRAM

## 2023-07-12 PROCEDURE — 3074F SYST BP LT 130 MM HG: CPT | Mod: AF,HB,CPTII, | Performed by: STUDENT IN AN ORGANIZED HEALTH CARE EDUCATION/TRAINING PROGRAM

## 2023-07-12 PROCEDURE — 1160F RVW MEDS BY RX/DR IN RCRD: CPT | Mod: AF,HB,CPTII, | Performed by: STUDENT IN AN ORGANIZED HEALTH CARE EDUCATION/TRAINING PROGRAM

## 2023-07-12 PROCEDURE — 99214 PR OFFICE/OUTPT VISIT, EST, LEVL IV, 30-39 MIN: ICD-10-PCS | Mod: S$PBB,AF,HB, | Performed by: STUDENT IN AN ORGANIZED HEALTH CARE EDUCATION/TRAINING PROGRAM

## 2023-07-12 PROCEDURE — 3079F PR MOST RECENT DIASTOLIC BLOOD PRESSURE 80-89 MM HG: ICD-10-PCS | Mod: AF,HB,CPTII, | Performed by: STUDENT IN AN ORGANIZED HEALTH CARE EDUCATION/TRAINING PROGRAM

## 2023-07-12 PROCEDURE — 99213 OFFICE O/P EST LOW 20 MIN: CPT | Mod: PBBFAC,PN | Performed by: STUDENT IN AN ORGANIZED HEALTH CARE EDUCATION/TRAINING PROGRAM

## 2023-07-12 PROCEDURE — 96136 PR PSYCH/NEUROPSYCH TEST ADMIN/SCORING, 2+ TESTS, 1ST 30 MIN: ICD-10-PCS | Mod: 59,AF,HB, | Performed by: STUDENT IN AN ORGANIZED HEALTH CARE EDUCATION/TRAINING PROGRAM

## 2023-07-12 PROCEDURE — 99214 OFFICE O/P EST MOD 30 MIN: CPT | Mod: S$PBB,AF,HB, | Performed by: STUDENT IN AN ORGANIZED HEALTH CARE EDUCATION/TRAINING PROGRAM

## 2023-07-12 PROCEDURE — 3079F DIAST BP 80-89 MM HG: CPT | Mod: AF,HB,CPTII, | Performed by: STUDENT IN AN ORGANIZED HEALTH CARE EDUCATION/TRAINING PROGRAM

## 2023-07-12 PROCEDURE — 1160F PR REVIEW ALL MEDS BY PRESCRIBER/CLIN PHARMACIST DOCUMENTED: ICD-10-PCS | Mod: AF,HB,CPTII, | Performed by: STUDENT IN AN ORGANIZED HEALTH CARE EDUCATION/TRAINING PROGRAM

## 2023-07-12 PROCEDURE — 4010F ACE/ARB THERAPY RXD/TAKEN: CPT | Mod: AF,HB,CPTII, | Performed by: STUDENT IN AN ORGANIZED HEALTH CARE EDUCATION/TRAINING PROGRAM

## 2023-07-12 PROCEDURE — 99999 PR PBB SHADOW E&M-EST. PATIENT-LVL III: ICD-10-PCS | Mod: PBBFAC,AF,HB, | Performed by: STUDENT IN AN ORGANIZED HEALTH CARE EDUCATION/TRAINING PROGRAM

## 2023-07-12 PROCEDURE — 1159F MED LIST DOCD IN RCRD: CPT | Mod: AF,HB,CPTII, | Performed by: STUDENT IN AN ORGANIZED HEALTH CARE EDUCATION/TRAINING PROGRAM

## 2023-07-12 RX ORDER — FLUOXETINE HYDROCHLORIDE 20 MG/1
20 CAPSULE ORAL DAILY
Qty: 30 CAPSULE | Refills: 1 | Status: SHIPPED | OUTPATIENT
Start: 2023-07-12 | End: 2023-08-15 | Stop reason: DRUGHIGH

## 2023-07-12 RX ORDER — VILAZODONE HYDROCHLORIDE 10 MG/1
TABLET ORAL
Qty: 30 TABLET | Refills: 0 | Status: SHIPPED | OUTPATIENT
Start: 2023-07-12 | End: 2023-08-15

## 2023-07-12 RX ORDER — TRAZODONE HYDROCHLORIDE 300 MG/1
300 TABLET ORAL NIGHTLY
Qty: 30 TABLET | Refills: 1 | Status: SHIPPED | OUTPATIENT
Start: 2023-07-12 | End: 2023-08-15 | Stop reason: SDUPTHER

## 2023-07-12 NOTE — PROGRESS NOTES
Outpatient Psychiatry Followup Visit (DO/MD/NP)    7/12/2023    Assessment - Plan:     Diagnostic Impression     ICD-10-CM ICD-9-CM   1. Severe episode of recurrent major depressive disorder, without psychotic features  F33.2 296.33   2. Generalized anxiety disorder  F41.1 300.02   3. Insomnia, unspecified type  G47.00 780.52   4. BMI 34.0-34.9,adult  Z68.34 V85.34      7/12/2023 (3) Ongoing treatment of primary symptoms with some favorable progress.     Medication Management   Chart was reviewed. The risks and benefits of medication were discussed with pt. The treatment plan and followup plan were reviewed with pt. Pt understands to contact clinic if symptoms worsen. Pt understands to call 911 or go to nearest ER for suicidal ideation, intent or plan.   RX History CYMBALTA, EFFEXOR, LEXAPRO, TRAZODONE, TRINTELLIX, VIIBRYD and WELLBUTRIN   Current RX Considering LAMICTAL, LATUDA or ABILIFY as adjunctive agents  Considering a TCA as second line treatment  Considering BUSPAR  Start PROZAC  Useful for symptom coverage of anxiety and depression with low risk for weight gain.  Does not share pharmacokinetic profile of previously tried antidepressants  Pt was provided [employer] educational material 7/12/2023.  Adjustments:  5AFO4472: Start 20mg daily (after 1 week washout from VIIBRYD)  Prior to [id number] pt had been taking  TRAZODONE 250mg HS and VIIBRYD 40mg daily  Increase TRAZODONE  To better address depression  Adjustments:  00BFE2853: Increase to 300mg HS  69MYL7073: Increase to 250mg HS  97VBX8594: Increase to 200mg HS  60ZOJ1422: Increase to 150mg HS  05SUO5658: Increase to 100mg HS  99GST6665: Increase to 50mg HS (scheduled)  Prior to evaluation pt had been taking 50mg HS PRN INSOMNIA  Continue VIIBRYD  Adjustments:  19URE6852: Taper to discontinue (30mg x 5d, 20mg x 5d, 10mg x 5d)  19UAW1658: Increase to 40mg daily  37NQT6098: Increase to 20mg daily  94OFC3601: Start 10mg daily  Prior to evaluation pt had been  taking TRAZODONE   Education & Counseling RX administration and adherence   Other Orders Continue individual psychotherapy   Monitor VITAL SIGNS  Instruments: PROMIS (A&D), PSS4   RETURN K (MODERATE RISK): RETURN IN 4 WEEKS (ONE MONTH), and reassess frequency three visits from now  Continue medication management.     Interval History - Review of Systems:     Available documentation has been reviewed, and pertinent elements of the chart have been incorporated into this note where appropriate.   1/18/2023 : first Epic encounter with psychiatry department  6/27/2023 : last Epic encounter with psychiatry department  6/12/2023 : last Epic encounter with writer   Mirtha JENNIFER Terry, a 53 y.o. female, presenting for followup visit.      Problems with disability and workman's comp. Still applying for a new job.    Overall things are about the same.    Keeping therapy appointments.    Discussed increasing TRAZODONE and switching VIIBRYD to PROZAC to better address depression.     Vegetative Functions   Sleep [] Y  [x] N  About the same.   GI/ [] Y  [] N     Appetite [] Y  [] N     Emotion and Mood   Negative Bias-Rumination [x] Y  [] N  About the same.   Anhed./Context Insens. [x] Y  [] N  About the same.   Mood Dysregulation [] Y  [] N     Anxiety and Threat Perception   Rumination [x] Y  [] N  About the same.   Salience-Anxious Av. [] Y  [] N     Threat Dysregulation [] Y  [] N     Consciousness, Cognition and Reality Testing   Cognitive Dyscontrol [] Y  [] N     Inattention [] Y  [] N     Memory Problems [] Y  [] N     Perception Problems [] Y  [] N     Problems in Social Spheres   Home [] Y  [] N     Peers [] Y  [] N     Work [] Y  [] N     Stress [] Y  [] N       Pt did NOT display signs of nor endorse symptoms of overt psychosis or acute mood disorder requiring hospitalization during the encounter. Pt denied violent thoughts or suicidal or homicidal  "ideation, intent, or plan.       Measurement-Based Care (MBC):     Routine Instruments   PROMIS-ANXIETY Interpretation: T-SCORE 70+; SEVERE using 55-60-70 cutoffs. Compared to MODERATE (69) last time, PROMIS-ANXIETY WORSENED.   PROMIS-DEPRESSION Interpretation: T-SCORE 70+; SEVERE using 55-60-70 cutoffs. Compared to SEVERE (70+) last time, PROMIS-DEPRESSION is relatively stable.   PSS4 Interpretation: 14/16; HIGH using 6-11 cutoffs. 2 PH, 2 LSE. Compared to HIGH 12/16 last time, PSS4 is relatively stable.   Additional Instruments   N/A     Current Evaluation of Mental Status:     Constitutional / General       Vitals:    07/12/23 1413   BP: 113/80   Pulse: 79   Weight: 79.9 kg (176 lb 2.4 oz)   Height: 5' (1.524 m)       Psychiatric / Mental Status Examination  1. Appearance: Dress is informal but appropriate. Motor activity normal.  2. Discourse: Clear speech with normal rate and volume. Associations intact. Orderly.  3. Emotional Expression: Anxious and depressed mood. Affect is appropriate.  4. Perception and Thinking: No hallucinations. No suicidality, no homicidality, delusions, or paranoia.  5. Sensorium: Grossly intact. Able to focus for interview.  6. Memory and Fund of Knowledge: Intact for content of interview.  7. Insight and Judgment: Intact.         Auto-populated chart data omitted from this note for brevity.    Billing Documentation:     Method of Encounter IN PERSON visit at the clinic   Type of Encounter Follow up visit with me   Counseling;  Psychotherapy Not applicable: Not done or UNDER 16 minutes   Counseling;  Tobacco and/or Nicotine Not applicable: Not done or UNDER 3 minutes   Additional Codes and Modifiers 45026, with modifer 59: administered and scored more than one psychological or neuropsychological tests (see MBC above) (16+ mins)   Time Remaining Chart/Pt 01396: FOLLOW UP VISIT, Rx mgmt, "Multiple STABLE chronic illnesses"   Total Mins  (7/12/2023) N/A - Not billing for time      "   Juno Valdovinos, DO  Department of Psychiatry, [facility]

## 2023-07-12 NOTE — PATIENT INSTRUCTIONS
Increase TRAZODONE to 300mg NIGHTLY     TAPER TO DISCONTINUE VIIBRYD with 10mg tabs: Take 3 tabs daily (30mg total) for 5 days, then 2 tabs daily (20mg total) for 5 days, then 1 tab (10mg total) daily for 5 days, then discontinue. Take with food.    Wait a week after stopping VIIBRYD then start PROZAC 20mg daily    Keep therapy appointments

## 2023-07-19 DIAGNOSIS — N95.1 MENOPAUSAL SYNDROME: Primary | ICD-10-CM

## 2023-08-08 ENCOUNTER — OFFICE VISIT (OUTPATIENT)
Dept: PSYCHIATRY | Facility: CLINIC | Age: 53
End: 2023-08-08
Payer: MEDICAID

## 2023-08-08 DIAGNOSIS — F33.1 MODERATE EPISODE OF RECURRENT MAJOR DEPRESSIVE DISORDER: Primary | ICD-10-CM

## 2023-08-08 DIAGNOSIS — F41.1 GENERALIZED ANXIETY DISORDER: ICD-10-CM

## 2023-08-08 PROCEDURE — 99211 OFF/OP EST MAY X REQ PHY/QHP: CPT | Mod: PBBFAC,PN | Performed by: SOCIAL WORKER

## 2023-08-08 PROCEDURE — 99999 PR PBB SHADOW E&M-EST. PATIENT-LVL I: CPT | Mod: PBBFAC,AJ,HB, | Performed by: SOCIAL WORKER

## 2023-08-08 PROCEDURE — 1159F MED LIST DOCD IN RCRD: CPT | Mod: AJ,HB,CPTII, | Performed by: SOCIAL WORKER

## 2023-08-08 PROCEDURE — 4010F ACE/ARB THERAPY RXD/TAKEN: CPT | Mod: AJ,HB,CPTII, | Performed by: SOCIAL WORKER

## 2023-08-08 PROCEDURE — 4010F PR ACE/ARB THEARPY RXD/TAKEN: ICD-10-PCS | Mod: AJ,HB,CPTII, | Performed by: SOCIAL WORKER

## 2023-08-08 PROCEDURE — 90837 PSYTX W PT 60 MINUTES: CPT | Mod: AJ,HB,, | Performed by: SOCIAL WORKER

## 2023-08-08 PROCEDURE — 99999 PR PBB SHADOW E&M-EST. PATIENT-LVL I: ICD-10-PCS | Mod: PBBFAC,AJ,HB, | Performed by: SOCIAL WORKER

## 2023-08-08 PROCEDURE — 1159F PR MEDICATION LIST DOCUMENTED IN MEDICAL RECORD: ICD-10-PCS | Mod: AJ,HB,CPTII, | Performed by: SOCIAL WORKER

## 2023-08-08 PROCEDURE — 90837 PR PSYCHOTHERAPY W/PATIENT, 60 MIN: ICD-10-PCS | Mod: AJ,HB,, | Performed by: SOCIAL WORKER

## 2023-08-09 NOTE — PROGRESS NOTES
Individual Psychotherapy (PhD/LCSW)    8/8/2023    Site:  [place]         Therapeutic Intervention: Met with patient.  Outpatient - Insight oriented psychotherapy 60 min - CPT code 52522, Outpatient - Behavior modifying psychotherapy 60 min - CPT code 27122, and Outpatient - Supportive psychotherapy 60 min - CPT Code 20303    Chief complaint/reason for encounter: depression and anxiety             Interval history and content of current session:  Ct was referred to tx by Dr. Valdovinos to address depression and anxiety. Ct arrived to session and was fully engaged. Ct shared that things have been the same.  Client continue to processed how her incident at work changed her entire life.  Client has physical limitations and is having difficulty finding work.  She has stress regarding her financial situation.  Client shared about the negative impact that her depression has on her life.   and client processed the importance of client recognizing the things that she is trying to do within her limitations to improve her situation.   offered encouragement and support to client.  Client shared that she will continue to focus on doing what she can within her limitations.  Client to continue in one-to-one sessions.      Treatment plan:  Target symptoms: depression, anxiety   Why chosen therapy is appropriate versus another modality: relevant to diagnosis, patient responds to this modality, evidence based practice  Outcome monitoring methods: self-report  Therapeutic intervention type: insight oriented psychotherapy, behavior modifying psychotherapy, supportive psychotherapy    Risk parameters:  Patient reports no suicidal ideation  Patient reports no homicidal ideation  Patient reports no self-injurious behavior  Patient reports no violent behavior    CSSRS was completed:     Mental Status Exam:  General Appearance:  unremarkable, age appropriate   Speech: normal tone, normal rate, normal pitch, normal  volume      Level of Cooperation: cooperative      Thought Processes: normal and logical   Mood: euthymic      Thought Content: normal, no suicidality, no homicidality, delusions, or paranoia   Affect: congruent and appropriate   Orientation: Oriented x3   Memory: recent >  intact   Attention Span & Concentration: intact   Fund of General Knowledge: intact and appropriate to age and level of education   Abstract Reasoning: interpretation of similarities was abstract   Judgment & Insight: intact     Language intact       Verbal deficits: None    Patient's response to intervention:  The patient's response to intervention is accepting.    Progress toward goals and other mental status changes:  The patient's progress toward goals is fair .    Diagnosis:     ICD-10-CM ICD-9-CM   1. Moderate episode of recurrent major depressive disorder  F33.1 296.32   2. Generalized anxiety disorder  F41.1 300.02       Plan:  individual psychotherapy and ct to follow up with Dr. Valdovinos for psych med mgt  Pt to go to ED or call 911 if symptoms worsen or if she has thoughts of harming self and/or others. Pt verbalized understanding.    Return to clinic: as scheduled    Length of Service (minutes): 60      A portion of this note was created using [employer] voice recognition software that occasionally misinterprets phrases or words.    Each patient to whom he or she provides medical services by telemedicine is: (1) informed of the relationship between the physician and patient and the respective role of any other health care provider with respect to management of the patient; and (2) notified that he or she may decline to receive medical services by telemedicine and may withdraw from such care at any time.

## 2023-08-15 ENCOUNTER — OFFICE VISIT (OUTPATIENT)
Dept: PSYCHIATRY | Facility: CLINIC | Age: 53
End: 2023-08-15
Payer: MEDICAID

## 2023-08-15 VITALS
DIASTOLIC BLOOD PRESSURE: 67 MMHG | HEIGHT: 60 IN | SYSTOLIC BLOOD PRESSURE: 97 MMHG | WEIGHT: 169.75 LBS | BODY MASS INDEX: 33.33 KG/M2 | HEART RATE: 86 BPM

## 2023-08-15 DIAGNOSIS — F41.1 GENERALIZED ANXIETY DISORDER: ICD-10-CM

## 2023-08-15 DIAGNOSIS — G47.00 INSOMNIA, UNSPECIFIED TYPE: ICD-10-CM

## 2023-08-15 DIAGNOSIS — F33.2 SEVERE EPISODE OF RECURRENT MAJOR DEPRESSIVE DISORDER, WITHOUT PSYCHOTIC FEATURES: Primary | ICD-10-CM

## 2023-08-15 PROCEDURE — 3078F DIAST BP <80 MM HG: CPT | Mod: AF,HB,CPTII, | Performed by: STUDENT IN AN ORGANIZED HEALTH CARE EDUCATION/TRAINING PROGRAM

## 2023-08-15 PROCEDURE — 99999 PR PBB SHADOW E&M-EST. PATIENT-LVL III: ICD-10-PCS | Mod: PBBFAC,AF,HB, | Performed by: STUDENT IN AN ORGANIZED HEALTH CARE EDUCATION/TRAINING PROGRAM

## 2023-08-15 PROCEDURE — 1160F PR REVIEW ALL MEDS BY PRESCRIBER/CLIN PHARMACIST DOCUMENTED: ICD-10-PCS | Mod: AF,HB,CPTII, | Performed by: STUDENT IN AN ORGANIZED HEALTH CARE EDUCATION/TRAINING PROGRAM

## 2023-08-15 PROCEDURE — 99999 PR PBB SHADOW E&M-EST. PATIENT-LVL III: CPT | Mod: PBBFAC,AF,HB, | Performed by: STUDENT IN AN ORGANIZED HEALTH CARE EDUCATION/TRAINING PROGRAM

## 2023-08-15 PROCEDURE — 1159F PR MEDICATION LIST DOCUMENTED IN MEDICAL RECORD: ICD-10-PCS | Mod: AF,HB,CPTII, | Performed by: STUDENT IN AN ORGANIZED HEALTH CARE EDUCATION/TRAINING PROGRAM

## 2023-08-15 PROCEDURE — 3008F PR BODY MASS INDEX (BMI) DOCUMENTED: ICD-10-PCS | Mod: AF,HB,CPTII, | Performed by: STUDENT IN AN ORGANIZED HEALTH CARE EDUCATION/TRAINING PROGRAM

## 2023-08-15 PROCEDURE — 1159F MED LIST DOCD IN RCRD: CPT | Mod: AF,HB,CPTII, | Performed by: STUDENT IN AN ORGANIZED HEALTH CARE EDUCATION/TRAINING PROGRAM

## 2023-08-15 PROCEDURE — 4010F ACE/ARB THERAPY RXD/TAKEN: CPT | Mod: AF,HB,CPTII, | Performed by: STUDENT IN AN ORGANIZED HEALTH CARE EDUCATION/TRAINING PROGRAM

## 2023-08-15 PROCEDURE — 1160F RVW MEDS BY RX/DR IN RCRD: CPT | Mod: AF,HB,CPTII, | Performed by: STUDENT IN AN ORGANIZED HEALTH CARE EDUCATION/TRAINING PROGRAM

## 2023-08-15 PROCEDURE — 3078F PR MOST RECENT DIASTOLIC BLOOD PRESSURE < 80 MM HG: ICD-10-PCS | Mod: AF,HB,CPTII, | Performed by: STUDENT IN AN ORGANIZED HEALTH CARE EDUCATION/TRAINING PROGRAM

## 2023-08-15 PROCEDURE — 3074F PR MOST RECENT SYSTOLIC BLOOD PRESSURE < 130 MM HG: ICD-10-PCS | Mod: AF,HB,CPTII, | Performed by: STUDENT IN AN ORGANIZED HEALTH CARE EDUCATION/TRAINING PROGRAM

## 2023-08-15 PROCEDURE — 96136 PR PSYCH/NEUROPSYCH TEST ADMIN/SCORING, 2+ TESTS, 1ST 30 MIN: ICD-10-PCS | Mod: 59,AF,HB, | Performed by: STUDENT IN AN ORGANIZED HEALTH CARE EDUCATION/TRAINING PROGRAM

## 2023-08-15 PROCEDURE — 3008F BODY MASS INDEX DOCD: CPT | Mod: AF,HB,CPTII, | Performed by: STUDENT IN AN ORGANIZED HEALTH CARE EDUCATION/TRAINING PROGRAM

## 2023-08-15 PROCEDURE — 99213 OFFICE O/P EST LOW 20 MIN: CPT | Mod: PBBFAC,PN | Performed by: STUDENT IN AN ORGANIZED HEALTH CARE EDUCATION/TRAINING PROGRAM

## 2023-08-15 PROCEDURE — 99214 PR OFFICE/OUTPT VISIT, EST, LEVL IV, 30-39 MIN: ICD-10-PCS | Mod: S$PBB,AF,HB, | Performed by: STUDENT IN AN ORGANIZED HEALTH CARE EDUCATION/TRAINING PROGRAM

## 2023-08-15 PROCEDURE — 96136 PSYCL/NRPSYC TST PHY/QHP 1ST: CPT | Mod: 59,AF,HB, | Performed by: STUDENT IN AN ORGANIZED HEALTH CARE EDUCATION/TRAINING PROGRAM

## 2023-08-15 PROCEDURE — 4010F PR ACE/ARB THEARPY RXD/TAKEN: ICD-10-PCS | Mod: AF,HB,CPTII, | Performed by: STUDENT IN AN ORGANIZED HEALTH CARE EDUCATION/TRAINING PROGRAM

## 2023-08-15 PROCEDURE — 3074F SYST BP LT 130 MM HG: CPT | Mod: AF,HB,CPTII, | Performed by: STUDENT IN AN ORGANIZED HEALTH CARE EDUCATION/TRAINING PROGRAM

## 2023-08-15 PROCEDURE — 99214 OFFICE O/P EST MOD 30 MIN: CPT | Mod: S$PBB,AF,HB, | Performed by: STUDENT IN AN ORGANIZED HEALTH CARE EDUCATION/TRAINING PROGRAM

## 2023-08-15 RX ORDER — TRAZODONE HYDROCHLORIDE 300 MG/1
300 TABLET ORAL NIGHTLY
Qty: 30 TABLET | Refills: 1 | Status: SHIPPED | OUTPATIENT
Start: 2023-08-15 | End: 2023-10-24 | Stop reason: SDUPTHER

## 2023-08-15 RX ORDER — FLUOXETINE HYDROCHLORIDE 40 MG/1
40 CAPSULE ORAL DAILY
Qty: 30 CAPSULE | Refills: 1 | Status: SHIPPED | OUTPATIENT
Start: 2023-08-15 | End: 2023-09-14 | Stop reason: SDUPTHER

## 2023-08-15 NOTE — PROGRESS NOTES
Outpatient Psychiatry Followup Visit (DO/MD/NP)    8/15/2023  Assessment & Plan    Assessment - Plan:     Impression   8/15/2023 (3) Ongoing treatment of primary symptoms with some favorable progress.     ICD-10-CM ICD-9-CM   1. Severe episode of recurrent major depressive disorder, without psychotic features  F33.2 296.33   2. Generalized anxiety disorder  F41.1 300.02   3. Insomnia, unspecified type  G47.00 780.52   4. BMI 33.0-33.9,adult  Z68.33 V85.33        Plan of Care & Medication Management    Chart was reviewed. The risks and benefits of medication were discussed with pt. The treatment plan and followup plan were reviewed with pt. Pt understands to contact clinic if symptoms worsen. Pt understands to call 911 or go to nearest ER for suicidal ideation, intent or plan.   RX History CYMBALTA, EFFEXOR, LEXAPRO, TRAZODONE, TRINTELLIX, VIIBRYD and WELLBUTRIN   Current RX Considering LAMICTAL, LATUDA or ABILIFY as adjunctive agents  Considering a TCA as second line treatment  Considering BUSPAR  Increase PROZAC  Useful for symptom coverage of anxiety and depression with low risk for weight gain.  Does not share pharmacokinetic profile of previously tried antidepressants  Pt was provided [employer] educational material 7/12/2023.  Adjustments:  [id number]: Increase to 40mg daily  5JXZ4451: Start 20mg daily (after 1 week washout from VIIBRYD)  Prior to [id number] pt had been taking  TRAZODONE 250mg HS and VIIBRYD 40mg daily  Continue TRAZODONE  To better address depression  Adjustments:  [id number]: Increase to 300mg HS  31NCL7338: Increase to 250mg HS  82LGE8149: Increase to 200mg HS  17VPN4976: Increase to 150mg HS  48DSV8303: Increase to 100mg HS  03QJE6145: Increase to 50mg HS (scheduled)  Prior to evaluation pt had been taking 50mg HS PRN INSOMNIA   Education & Counseling RX administration and adherence   Other Orders Referral to IOP  Continue individual psychotherapy   Monitor VITAL SIGNS  Instruments: PROMIS (A&D),  PSS4   RETURN L: RETURN IN 4 WEEKS (ONE MONTH), and reassess frequency two visits from now  Continue medication management.     Subjective    Interval History - Review of Systems (ROS):     Available documentation has been reviewed, and pertinent elements of the chart have been incorporated into this note where appropriate.   1/18/2023 : first Epic encounter with psychiatry department  8/8/2023 : last Epic encounter with psychiatry department  7/12/2023 : last Epic encounter with writer   Mirtha Stewart, a 53 y.o. female, presenting for followup visit.      Tolerated switch from VIIBRYD to PROZAC. Discussed increasing further. Not feeling worse. Happy that lost some weight. Exercising.    BP a bit low.     Vegetative Functions   Sleep [x] Y  [] N  About the same.   GI/ [] Y  [] N     Appetite [] Y  [] N     Emotion and Mood   Negative Bias [x] Y  [] N     Hedonic Capacity [x] Y  [] N  Anticipatory pleasure IMPROVED.  Consummatory pleasure REDUCED.   Mood Dysregulation [x] Y  [] N  MOOD DYSREGULATION: fewer crying spells.   Anxiety and Threat Perception   Rumination [x] Y  [] N  About the same.   Salience-Anxious Av. [] Y  [] N     Threat Dysregulation [] Y  [] N     Consciousness, Cognition and Reality Testing   Cognitive Dyscontrol [] Y  [] N     Inattention [] Y  [] N     Memory Problems [] Y  [] N     Perception Problems [] Y  [] N     Problems in Social Spheres   Home [] Y  [] N     Peers [] Y  [x] N  Socializing more.   Work [] Y  [] N     Stress [] Y  [] N       Pt did NOT display signs of nor endorse symptoms of overt psychosis or acute mood disorder requiring hospitalization during the encounter. Pt denied violent thoughts or suicidal or homicidal ideation, intent, or plan.     Objective    Measurement-Based Care (MBC):     Routine Instruments   PROMIS-ANXIETY Interpretation: T-SCORE 70+; SEVERE using 55-60-70 cutoffs. Compared to  "SEVERE last time, PROMIS-ANXIETY is relatively stable.   PROMIS-DEPRESSION Interpretation: T-SCORE 70+; SEVERE using 55-60-70 cutoffs. Compared to SEVERE last time, PROMIS-DEPRESSION is relatively stable.   PSS4 Interpretation: 13/16; HIGH using 6-11 cutoffs. 2 PH, 1 LSE. Compared to HIGH last time, PSS4 is relatively stable.   Additional Instruments   N/A     Current Evaluation of Mental Status:     Constitutional / General       Vitals:    08/15/23 1009   BP: 97/67   Pulse: 86   Weight: 77 kg (169 lb 12.1 oz)   Height: 5' (1.524 m)       Psychiatric / Mental Status Examination  1. Appearance: Dress is informal but appropriate. Motor activity normal.  2. Discourse: Clear speech with normal rate and volume. Associations intact. Orderly.  3. Emotional Expression: Anxious and depressed mood. Affect is appropriate.  4. Perception and Thinking: No hallucinations. No suicidality, no homicidality, delusions, or paranoia.  5. Sensorium: Grossly intact. Able to focus for interview.  6. Memory and Fund of Knowledge: Intact for content of interview.  7. Insight and Judgment: Intact.         Auto-populated chart data omitted from this note for brevity.      Billing Documentation:     Method of Encounter IN PERSON visit at the clinic   Type of Encounter Follow up visit with me   Counseling;  Psychotherapy    Counseling;  Tobacco and/or Nicotine    Additional Codes and Modifiers 57552, with modifer 59: administered and scored more than one psychological or neuropsychological tests (see MBC above) (16+ mins)   Time Remaining Chart/Pt 94917: FOLLOW UP VISIT, Rx mgmt, "Multiple STABLE chronic illnesses"   Total Mins  (8/15/2023) N/A - Not billing for time        Juno Valdovinos DO  Department of Psychiatry, [facility]        "

## 2023-08-16 ENCOUNTER — LAB VISIT (OUTPATIENT)
Dept: LAB | Facility: HOSPITAL | Age: 53
End: 2023-08-16
Attending: FAMILY MEDICINE
Payer: MEDICAID

## 2023-08-16 DIAGNOSIS — I10 HYPERTENSION, ESSENTIAL: ICD-10-CM

## 2023-08-16 DIAGNOSIS — N95.1 MENOPAUSAL SYNDROME: ICD-10-CM

## 2023-08-16 DIAGNOSIS — E78.5 HYPERLIPIDEMIA, UNSPECIFIED HYPERLIPIDEMIA TYPE: ICD-10-CM

## 2023-08-16 LAB
ALBUMIN SERPL BCP-MCNC: 4.2 G/DL (ref 3.5–5.2)
ALP SERPL-CCNC: 51 U/L (ref 55–135)
ALT SERPL W/O P-5'-P-CCNC: 31 U/L (ref 10–44)
ANION GAP SERPL CALC-SCNC: 6 MMOL/L (ref 8–16)
ANISOCYTOSIS BLD QL SMEAR: SLIGHT
AST SERPL-CCNC: 26 U/L (ref 10–40)
BASOPHILS # BLD AUTO: 0.03 K/UL (ref 0–0.2)
BASOPHILS NFR BLD: 0.6 % (ref 0–1.9)
BILIRUB SERPL-MCNC: 0.9 MG/DL (ref 0.1–1)
BUN SERPL-MCNC: 26 MG/DL (ref 6–20)
CALCIUM SERPL-MCNC: 9.1 MG/DL (ref 8.7–10.5)
CHLORIDE SERPL-SCNC: 100 MMOL/L (ref 95–110)
CHOLEST SERPL-MCNC: 135 MG/DL (ref 120–199)
CHOLEST/HDLC SERPL: 3.6 {RATIO} (ref 2–5)
CO2 SERPL-SCNC: 29 MMOL/L (ref 23–29)
CREAT SERPL-MCNC: 0.8 MG/DL (ref 0.5–1.4)
DACRYOCYTES BLD QL SMEAR: ABNORMAL
DIFFERENTIAL METHOD: ABNORMAL
EOSINOPHIL # BLD AUTO: 0.2 K/UL (ref 0–0.5)
EOSINOPHIL NFR BLD: 4.5 % (ref 0–8)
ERYTHROCYTE [DISTWIDTH] IN BLOOD BY AUTOMATED COUNT: 18.1 % (ref 11.5–14.5)
EST. GFR  (NO RACE VARIABLE): >60 ML/MIN/1.73 M^2
GLUCOSE SERPL-MCNC: 96 MG/DL (ref 70–110)
HCT VFR BLD AUTO: 36.7 % (ref 37–48.5)
HDLC SERPL-MCNC: 38 MG/DL (ref 40–75)
HDLC SERPL: 28.1 % (ref 20–50)
HGB BLD-MCNC: 11.2 G/DL (ref 12–16)
IMM GRANULOCYTES # BLD AUTO: 0.01 K/UL (ref 0–0.04)
IMM GRANULOCYTES NFR BLD AUTO: 0.2 % (ref 0–0.5)
LDLC SERPL CALC-MCNC: 85.8 MG/DL (ref 63–159)
LYMPHOCYTES # BLD AUTO: 1.6 K/UL (ref 1–4.8)
LYMPHOCYTES NFR BLD: 32.1 % (ref 18–48)
MCH RBC QN AUTO: 19 PG (ref 27–31)
MCHC RBC AUTO-ENTMCNC: 30.5 G/DL (ref 32–36)
MCV RBC AUTO: 62 FL (ref 82–98)
MONOCYTES # BLD AUTO: 0.4 K/UL (ref 0.3–1)
MONOCYTES NFR BLD: 7.5 % (ref 4–15)
NEUTROPHILS # BLD AUTO: 2.8 K/UL (ref 1.8–7.7)
NEUTROPHILS NFR BLD: 55.1 % (ref 38–73)
NONHDLC SERPL-MCNC: 97 MG/DL
NRBC BLD-RTO: 0 /100 WBC
OVALOCYTES BLD QL SMEAR: ABNORMAL
PLATELET # BLD AUTO: 227 K/UL (ref 150–450)
PLATELET BLD QL SMEAR: ABNORMAL
PMV BLD AUTO: 10.7 FL (ref 9.2–12.9)
POIKILOCYTOSIS BLD QL SMEAR: SLIGHT
POTASSIUM SERPL-SCNC: 4.4 MMOL/L (ref 3.5–5.1)
PROT SERPL-MCNC: 7.6 G/DL (ref 6–8.4)
RBC # BLD AUTO: 5.88 M/UL (ref 4–5.4)
SCHISTOCYTES BLD QL SMEAR: PRESENT
SODIUM SERPL-SCNC: 135 MMOL/L (ref 136–145)
TRIGL SERPL-MCNC: 56 MG/DL (ref 30–150)
WBC # BLD AUTO: 5.07 K/UL (ref 3.9–12.7)

## 2023-08-16 PROCEDURE — 80053 COMPREHEN METABOLIC PANEL: CPT | Performed by: FAMILY MEDICINE

## 2023-08-16 PROCEDURE — 83001 ASSAY OF GONADOTROPIN (FSH): CPT | Performed by: FAMILY MEDICINE

## 2023-08-16 PROCEDURE — 82670 ASSAY OF TOTAL ESTRADIOL: CPT | Performed by: FAMILY MEDICINE

## 2023-08-16 PROCEDURE — 80061 LIPID PANEL: CPT | Performed by: FAMILY MEDICINE

## 2023-08-16 PROCEDURE — 83002 ASSAY OF GONADOTROPIN (LH): CPT | Performed by: FAMILY MEDICINE

## 2023-08-16 PROCEDURE — 85025 COMPLETE CBC W/AUTO DIFF WBC: CPT | Performed by: FAMILY MEDICINE

## 2023-08-16 PROCEDURE — 36415 COLL VENOUS BLD VENIPUNCTURE: CPT | Performed by: FAMILY MEDICINE

## 2023-08-17 LAB
ESTRADIOL SERPL-MCNC: 5.4 PG/ML
FSH SERPL-ACNC: 80.4 MIU/ML
LH SERPL-ACNC: 49.5 MIU/ML

## 2023-08-18 ENCOUNTER — OFFICE VISIT (OUTPATIENT)
Dept: FAMILY MEDICINE | Facility: CLINIC | Age: 53
End: 2023-08-18
Payer: MEDICAID

## 2023-08-18 VITALS
WEIGHT: 169.56 LBS | DIASTOLIC BLOOD PRESSURE: 66 MMHG | BODY MASS INDEX: 33.29 KG/M2 | HEART RATE: 67 BPM | HEIGHT: 60 IN | OXYGEN SATURATION: 97 % | SYSTOLIC BLOOD PRESSURE: 98 MMHG | TEMPERATURE: 98 F

## 2023-08-18 DIAGNOSIS — E78.5 HYPERLIPIDEMIA, UNSPECIFIED HYPERLIPIDEMIA TYPE: Primary | ICD-10-CM

## 2023-08-18 DIAGNOSIS — Z91.89 AT HIGH RISK FOR BREAST CANCER: ICD-10-CM

## 2023-08-18 DIAGNOSIS — N95.1 MENOPAUSAL SYNDROME: ICD-10-CM

## 2023-08-18 DIAGNOSIS — D64.9 ANEMIA, UNSPECIFIED TYPE: ICD-10-CM

## 2023-08-18 DIAGNOSIS — Z96.651 S/P TKR (TOTAL KNEE REPLACEMENT), RIGHT: ICD-10-CM

## 2023-08-18 DIAGNOSIS — K21.9 GASTROESOPHAGEAL REFLUX DISEASE, UNSPECIFIED WHETHER ESOPHAGITIS PRESENT: ICD-10-CM

## 2023-08-18 DIAGNOSIS — F32.A DEPRESSION, UNSPECIFIED DEPRESSION TYPE: ICD-10-CM

## 2023-08-18 DIAGNOSIS — Z79.1 NSAID LONG-TERM USE: ICD-10-CM

## 2023-08-18 DIAGNOSIS — D56.9 THALASSEMIA, UNSPECIFIED TYPE: ICD-10-CM

## 2023-08-18 DIAGNOSIS — I10 HYPERTENSION, ESSENTIAL: ICD-10-CM

## 2023-08-18 DIAGNOSIS — G47.00 INSOMNIA, UNSPECIFIED TYPE: ICD-10-CM

## 2023-08-18 DIAGNOSIS — E55.9 VITAMIN D INSUFFICIENCY: ICD-10-CM

## 2023-08-18 PROCEDURE — 3074F PR MOST RECENT SYSTOLIC BLOOD PRESSURE < 130 MM HG: ICD-10-PCS | Mod: CPTII,S$GLB,, | Performed by: FAMILY MEDICINE

## 2023-08-18 PROCEDURE — 1160F PR REVIEW ALL MEDS BY PRESCRIBER/CLIN PHARMACIST DOCUMENTED: ICD-10-PCS | Mod: CPTII,S$GLB,, | Performed by: FAMILY MEDICINE

## 2023-08-18 PROCEDURE — 3078F PR MOST RECENT DIASTOLIC BLOOD PRESSURE < 80 MM HG: ICD-10-PCS | Mod: CPTII,S$GLB,, | Performed by: FAMILY MEDICINE

## 2023-08-18 PROCEDURE — 3074F SYST BP LT 130 MM HG: CPT | Mod: CPTII,S$GLB,, | Performed by: FAMILY MEDICINE

## 2023-08-18 PROCEDURE — 3008F PR BODY MASS INDEX (BMI) DOCUMENTED: ICD-10-PCS | Mod: CPTII,S$GLB,, | Performed by: FAMILY MEDICINE

## 2023-08-18 PROCEDURE — 4010F ACE/ARB THERAPY RXD/TAKEN: CPT | Mod: CPTII,S$GLB,, | Performed by: FAMILY MEDICINE

## 2023-08-18 PROCEDURE — 99214 OFFICE O/P EST MOD 30 MIN: CPT | Mod: S$GLB,,, | Performed by: FAMILY MEDICINE

## 2023-08-18 PROCEDURE — 3008F BODY MASS INDEX DOCD: CPT | Mod: CPTII,S$GLB,, | Performed by: FAMILY MEDICINE

## 2023-08-18 PROCEDURE — 4010F PR ACE/ARB THEARPY RXD/TAKEN: ICD-10-PCS | Mod: CPTII,S$GLB,, | Performed by: FAMILY MEDICINE

## 2023-08-18 PROCEDURE — 3078F DIAST BP <80 MM HG: CPT | Mod: CPTII,S$GLB,, | Performed by: FAMILY MEDICINE

## 2023-08-18 PROCEDURE — 99214 PR OFFICE/OUTPT VISIT, EST, LEVL IV, 30-39 MIN: ICD-10-PCS | Mod: S$GLB,,, | Performed by: FAMILY MEDICINE

## 2023-08-18 PROCEDURE — 1159F PR MEDICATION LIST DOCUMENTED IN MEDICAL RECORD: ICD-10-PCS | Mod: CPTII,S$GLB,, | Performed by: FAMILY MEDICINE

## 2023-08-18 PROCEDURE — 1160F RVW MEDS BY RX/DR IN RCRD: CPT | Mod: CPTII,S$GLB,, | Performed by: FAMILY MEDICINE

## 2023-08-18 PROCEDURE — 1159F MED LIST DOCD IN RCRD: CPT | Mod: CPTII,S$GLB,, | Performed by: FAMILY MEDICINE

## 2023-08-18 RX ORDER — ROSUVASTATIN CALCIUM 20 MG/1
20 TABLET, COATED ORAL DAILY
Qty: 90 TABLET | Refills: 1 | Status: SHIPPED | OUTPATIENT
Start: 2023-08-18 | End: 2024-02-08

## 2023-08-18 RX ORDER — MONTELUKAST SODIUM 10 MG/1
10 TABLET ORAL NIGHTLY
Qty: 90 TABLET | Refills: 1 | Status: SHIPPED | OUTPATIENT
Start: 2023-08-18 | End: 2024-02-20

## 2023-08-18 RX ORDER — HYDROCHLOROTHIAZIDE 25 MG/1
25 TABLET ORAL DAILY
Qty: 90 TABLET | Refills: 1 | Status: SHIPPED | OUTPATIENT
Start: 2023-08-18 | End: 2024-04-01 | Stop reason: SDUPTHER

## 2023-08-18 RX ORDER — POTASSIUM CHLORIDE 20 MEQ/1
20 TABLET, EXTENDED RELEASE ORAL DAILY
Qty: 90 TABLET | Refills: 1 | Status: SHIPPED | OUTPATIENT
Start: 2023-08-18 | End: 2024-04-01 | Stop reason: SDUPTHER

## 2023-08-20 PROBLEM — Z91.89 AT HIGH RISK FOR BREAST CANCER: Status: ACTIVE | Noted: 2023-08-20

## 2023-08-20 PROBLEM — Z79.1 NSAID LONG-TERM USE: Status: ACTIVE | Noted: 2023-08-20

## 2023-08-20 PROBLEM — Z96.651 S/P TKR (TOTAL KNEE REPLACEMENT), RIGHT: Status: ACTIVE | Noted: 2023-08-20

## 2023-08-20 PROBLEM — F32.A DEPRESSION: Status: ACTIVE | Noted: 2023-08-20

## 2023-08-20 PROBLEM — K21.9 GASTROESOPHAGEAL REFLUX DISEASE: Status: ACTIVE | Noted: 2023-08-20

## 2023-08-20 PROBLEM — D56.9 THALASSEMIA: Status: ACTIVE | Noted: 2023-08-20

## 2023-08-20 NOTE — PROGRESS NOTES
Subjective:       Patient ID: Mirtha Stewart is a 53 y.o. female.    Chief Complaint: Follow-up    BMI 33.1.  Down 6 lb dieting.  Blood pressure is under control now.  Holding her antihypertensive for couple of days.  Was getting lightheaded.  Hyperlipidemia cholesterol 130 HDL 38 and LDL is 86.  Insomnia trazodone 300 mg HS.  Seeing psychiatry.  Depression Prozac 40 mg daily.  Being increased.  Gastroesophageal reflux disease is doing okay on medication.  Using nonsteroidal anti-inflammatory.  Still has knee pain.  Had the right knee replaced.  But pain ongoing.  Steroid and nerve block without relief.  Menopausal status post hysterectomy.  FSH is 80 LH is 49 estradiol has dropped.  Discussed hormone replacement.  But I know that she is considered to be at high-risk for breast cancer 25%.  So should not use estrogen.  Anemia hemoglobin 11.2 MCV 62 has thalassemia.  Also has vitamin-D deficiency.  CBC and CMP are normal.  There is a family history of breast cancer in her mother.    Physical examination.  Vital signs noted.  No acute distress.  Neck without bruit.  Chest clear.  Heart regular rate rhythm.  Extremities right knee slightly painful.  No pedal edema.        Objective:        Assessment:       1. Hyperlipidemia, unspecified hyperlipidemia type    2. Hypertension, essential    3. BMI 33.0-33.9,adult    4. Insomnia, unspecified type    5. Depression, unspecified depression type    6. Gastroesophageal reflux disease, unspecified whether esophagitis present    7. NSAID long-term use    8. S/P TKR (total knee replacement), right    9. Menopausal syndrome    10. Anemia, unspecified type    11. At high risk for breast cancer    12. Thalassemia, unspecified type    13. Vitamin D insufficiency        Plan:       Hyperlipidemia, unspecified hyperlipidemia type    Hypertension, essential    BMI 33.0-33.9,adult    Insomnia, unspecified type    Depression, unspecified depression type    Gastroesophageal reflux  disease, unspecified whether esophagitis present    NSAID long-term use    S/P TKR (total knee replacement), right    Menopausal syndrome  -     TESTOSTERONE; Future; Expected date: 11/18/2023    Anemia, unspecified type    At high risk for breast cancer    Thalassemia, unspecified type    Vitamin D insufficiency  -     Vitamin D; Future; Expected date: 11/18/2023    Other orders  -     rosuvastatin (CRESTOR) 20 MG tablet; Take 1 tablet (20 mg total) by mouth once daily.  Dispense: 90 tablet; Refill: 1  -     montelukast (SINGULAIR) 10 mg tablet; Take 1 tablet (10 mg total) by mouth every evening.  Dispense: 90 tablet; Refill: 1  -     potassium chloride SA (K-DUR,KLOR-CON) 20 MEQ tablet; Take 1 tablet (20 mEq total) by mouth once daily.  Dispense: 90 tablet; Refill: 1  -     hydroCHLOROthiazide (HYDRODIURIL) 25 MG tablet; Take 1 tablet (25 mg total) by mouth once daily.  Dispense: 90 tablet; Refill: 1      Regular breast cancer screening.  Recommend against any hormones.  Check a testosterone level that she would like check due to weight gain.  Diet weight reduction.  Vitamin-D 5000 per day.  Check level in 3 months.  Continue whole follow-up blood pressure levels.

## 2023-09-14 ENCOUNTER — PATIENT MESSAGE (OUTPATIENT)
Dept: PSYCHIATRY | Facility: CLINIC | Age: 53
End: 2023-09-14
Payer: MEDICAID

## 2023-09-14 ENCOUNTER — OFFICE VISIT (OUTPATIENT)
Dept: PSYCHIATRY | Facility: CLINIC | Age: 53
End: 2023-09-14
Payer: MEDICAID

## 2023-09-14 VITALS
WEIGHT: 167.75 LBS | BODY MASS INDEX: 32.93 KG/M2 | SYSTOLIC BLOOD PRESSURE: 115 MMHG | HEART RATE: 71 BPM | HEIGHT: 60 IN | DIASTOLIC BLOOD PRESSURE: 74 MMHG

## 2023-09-14 DIAGNOSIS — F41.1 GENERALIZED ANXIETY DISORDER: ICD-10-CM

## 2023-09-14 DIAGNOSIS — G47.00 INSOMNIA, UNSPECIFIED TYPE: ICD-10-CM

## 2023-09-14 DIAGNOSIS — F33.2 SEVERE EPISODE OF RECURRENT MAJOR DEPRESSIVE DISORDER, WITHOUT PSYCHOTIC FEATURES: Primary | ICD-10-CM

## 2023-09-14 PROCEDURE — 99214 OFFICE O/P EST MOD 30 MIN: CPT | Mod: S$PBB,AF,HB, | Performed by: STUDENT IN AN ORGANIZED HEALTH CARE EDUCATION/TRAINING PROGRAM

## 2023-09-14 PROCEDURE — 3078F PR MOST RECENT DIASTOLIC BLOOD PRESSURE < 80 MM HG: ICD-10-PCS | Mod: AF,HB,CPTII, | Performed by: STUDENT IN AN ORGANIZED HEALTH CARE EDUCATION/TRAINING PROGRAM

## 2023-09-14 PROCEDURE — 1160F RVW MEDS BY RX/DR IN RCRD: CPT | Mod: AF,HB,CPTII, | Performed by: STUDENT IN AN ORGANIZED HEALTH CARE EDUCATION/TRAINING PROGRAM

## 2023-09-14 PROCEDURE — 4010F ACE/ARB THERAPY RXD/TAKEN: CPT | Mod: AF,HB,CPTII, | Performed by: STUDENT IN AN ORGANIZED HEALTH CARE EDUCATION/TRAINING PROGRAM

## 2023-09-14 PROCEDURE — 4010F ACE/ARB THERAPY RXD/TAKEN: CPT | Mod: AJ,HB,CPTII, | Performed by: SOCIAL WORKER

## 2023-09-14 PROCEDURE — 99999 PR PBB SHADOW E&M-EST. PATIENT-LVL I: ICD-10-PCS | Mod: PBBFAC,AJ,HB, | Performed by: SOCIAL WORKER

## 2023-09-14 PROCEDURE — 96136 PR PSYCH/NEUROPSYCH TEST ADMIN/SCORING, 2+ TESTS, 1ST 30 MIN: ICD-10-PCS | Mod: 59,AF,HB, | Performed by: STUDENT IN AN ORGANIZED HEALTH CARE EDUCATION/TRAINING PROGRAM

## 2023-09-14 PROCEDURE — 4010F PR ACE/ARB THEARPY RXD/TAKEN: ICD-10-PCS | Mod: AF,HB,CPTII, | Performed by: STUDENT IN AN ORGANIZED HEALTH CARE EDUCATION/TRAINING PROGRAM

## 2023-09-14 PROCEDURE — 3078F DIAST BP <80 MM HG: CPT | Mod: AF,HB,CPTII, | Performed by: STUDENT IN AN ORGANIZED HEALTH CARE EDUCATION/TRAINING PROGRAM

## 2023-09-14 PROCEDURE — 96136 PSYCL/NRPSYC TST PHY/QHP 1ST: CPT | Mod: 59,AF,HB, | Performed by: STUDENT IN AN ORGANIZED HEALTH CARE EDUCATION/TRAINING PROGRAM

## 2023-09-14 PROCEDURE — 99999 PR PBB SHADOW E&M-EST. PATIENT-LVL III: ICD-10-PCS | Mod: PBBFAC,AF,HB, | Performed by: STUDENT IN AN ORGANIZED HEALTH CARE EDUCATION/TRAINING PROGRAM

## 2023-09-14 PROCEDURE — 1159F PR MEDICATION LIST DOCUMENTED IN MEDICAL RECORD: ICD-10-PCS | Mod: AJ,HB,CPTII, | Performed by: SOCIAL WORKER

## 2023-09-14 PROCEDURE — 1159F MED LIST DOCD IN RCRD: CPT | Mod: AJ,HB,CPTII, | Performed by: SOCIAL WORKER

## 2023-09-14 PROCEDURE — 99999 PR PBB SHADOW E&M-EST. PATIENT-LVL I: CPT | Mod: PBBFAC,AJ,HB, | Performed by: SOCIAL WORKER

## 2023-09-14 PROCEDURE — 90837 PR PSYCHOTHERAPY W/PATIENT, 60 MIN: ICD-10-PCS | Mod: AJ,HB,, | Performed by: SOCIAL WORKER

## 2023-09-14 PROCEDURE — 90837 PSYTX W PT 60 MINUTES: CPT | Mod: AJ,HB,, | Performed by: SOCIAL WORKER

## 2023-09-14 PROCEDURE — 99211 OFF/OP EST MAY X REQ PHY/QHP: CPT | Mod: PBBFAC,27,PN | Performed by: SOCIAL WORKER

## 2023-09-14 PROCEDURE — 1159F PR MEDICATION LIST DOCUMENTED IN MEDICAL RECORD: ICD-10-PCS | Mod: AF,HB,CPTII, | Performed by: STUDENT IN AN ORGANIZED HEALTH CARE EDUCATION/TRAINING PROGRAM

## 2023-09-14 PROCEDURE — 99999 PR PBB SHADOW E&M-EST. PATIENT-LVL III: CPT | Mod: PBBFAC,AF,HB, | Performed by: STUDENT IN AN ORGANIZED HEALTH CARE EDUCATION/TRAINING PROGRAM

## 2023-09-14 PROCEDURE — 99214 PR OFFICE/OUTPT VISIT, EST, LEVL IV, 30-39 MIN: ICD-10-PCS | Mod: S$PBB,AF,HB, | Performed by: STUDENT IN AN ORGANIZED HEALTH CARE EDUCATION/TRAINING PROGRAM

## 2023-09-14 PROCEDURE — 3008F PR BODY MASS INDEX (BMI) DOCUMENTED: ICD-10-PCS | Mod: AF,HB,CPTII, | Performed by: STUDENT IN AN ORGANIZED HEALTH CARE EDUCATION/TRAINING PROGRAM

## 2023-09-14 PROCEDURE — 1159F MED LIST DOCD IN RCRD: CPT | Mod: AF,HB,CPTII, | Performed by: STUDENT IN AN ORGANIZED HEALTH CARE EDUCATION/TRAINING PROGRAM

## 2023-09-14 PROCEDURE — 99213 OFFICE O/P EST LOW 20 MIN: CPT | Mod: PBBFAC,PN | Performed by: STUDENT IN AN ORGANIZED HEALTH CARE EDUCATION/TRAINING PROGRAM

## 2023-09-14 PROCEDURE — 3008F BODY MASS INDEX DOCD: CPT | Mod: AF,HB,CPTII, | Performed by: STUDENT IN AN ORGANIZED HEALTH CARE EDUCATION/TRAINING PROGRAM

## 2023-09-14 PROCEDURE — 1160F PR REVIEW ALL MEDS BY PRESCRIBER/CLIN PHARMACIST DOCUMENTED: ICD-10-PCS | Mod: AF,HB,CPTII, | Performed by: STUDENT IN AN ORGANIZED HEALTH CARE EDUCATION/TRAINING PROGRAM

## 2023-09-14 PROCEDURE — 3074F SYST BP LT 130 MM HG: CPT | Mod: AF,HB,CPTII, | Performed by: STUDENT IN AN ORGANIZED HEALTH CARE EDUCATION/TRAINING PROGRAM

## 2023-09-14 PROCEDURE — 3074F PR MOST RECENT SYSTOLIC BLOOD PRESSURE < 130 MM HG: ICD-10-PCS | Mod: AF,HB,CPTII, | Performed by: STUDENT IN AN ORGANIZED HEALTH CARE EDUCATION/TRAINING PROGRAM

## 2023-09-14 PROCEDURE — 4010F PR ACE/ARB THEARPY RXD/TAKEN: ICD-10-PCS | Mod: AJ,HB,CPTII, | Performed by: SOCIAL WORKER

## 2023-09-14 RX ORDER — FLUOXETINE HYDROCHLORIDE 20 MG/1
CAPSULE ORAL
Qty: 30 CAPSULE | Refills: 1 | Status: SHIPPED | OUTPATIENT
Start: 2023-09-14 | End: 2023-10-24 | Stop reason: SDUPTHER

## 2023-09-14 RX ORDER — FLUOXETINE HYDROCHLORIDE 40 MG/1
CAPSULE ORAL
Qty: 30 CAPSULE | Refills: 1 | Status: SHIPPED | OUTPATIENT
Start: 2023-09-14 | End: 2023-10-24 | Stop reason: SDUPTHER

## 2023-09-14 NOTE — PROGRESS NOTES
Outpatient Psychiatry Followup Visit (DO/MD/NP)    9/14/2023  Assessment & Plan    Assessment - Plan:     Impression   9/14/2023 (3) Ongoing treatment of primary symptoms with some favorable progress.     ICD-10-CM ICD-9-CM   1. Severe episode of recurrent major depressive disorder, without psychotic features  F33.2 296.33   2. Generalized anxiety disorder  F41.1 300.02   3. Insomnia, unspecified type  G47.00 780.52   4. BMI 32.0-32.9,adult  Z68.32 V85.32        Plan of Care & Medication Management    Chart was reviewed. The risks and benefits of medication were discussed with pt. The treatment plan and followup plan were reviewed with pt. Pt understands to contact clinic if symptoms worsen. Pt understands to call 911 or go to nearest ER for suicidal ideation, intent or plan.   RX History CYMBALTA, EFFEXOR (didn't work), LEXAPRO, PROZAC, TRAZODONE, TRINTELLIX (didn't work), VIIBRYD (didn't work) and WELLBUTRIN   Current RX Considering LAMICTAL as adjunctive agents  Considering a TCA as second line treatment  Considering BUSPAR  Increase PROZAC  Useful for symptom coverage of anxiety and depression with low risk for weight gain.  Does not share pharmacokinetic profile of previously tried antidepressants  Pt was provided [employer] educational material 7/12/2023.  Adjustments:  [id number]: Increase to 60mg daily  53YQX3718: Increase to 40mg daily  7OGQ1247: Start 20mg daily (after 1 week washout from VIIBRYD)  Prior to [id number] pt had been taking  TRAZODONE 250mg HS and VIIBRYD 40mg daily  Continue TRAZODONE  Adjustments:  32ECY5110: Increase to 300mg HS  92QJR8012: Increase to 250mg HS  35SIS9150: Increase to 200mg HS  39NKW1016: Increase to 150mg HS  24HAB3677: Increase to 100mg HS  34NFK9954: Increase to 50mg HS (scheduled)  Prior to evaluation pt had been taking 50mg HS PRN INSOMNIA   Education & Counseling RX administration and adherence   Other Orders Continue individual psychotherapy   Monitor VITAL  SIGNS  Instruments: PROMIS (A&D), PSS4   RETURN M: RETURN IN 4 WEEKS (ONE MONTH), and reassess frequency next visit  Continue medication management.     Subjective    Interval History - Review of Systems (ROS):     Available documentation has been reviewed, and pertinent elements of the chart have been incorporated into this note where appropriate.   1/18/2023 : first Epic encounter with psychiatry department  8/15/2023 : last Epic encounter with psychiatry department  8/15/2023 : last Epic encounter with writer   Mirtha G Terry, a 53 y.o. female, presenting for followup visit.      Since last visit, reports overall about the same.    Lost 2 more pounds. Exercising. Menopause. Job search continues.    Keeping therapy appts.    BP better.    Discussed increasing PROZAC to 60mg/d, reassess in a month to determine need for augmentation with LAMICTAL.     Vegetative Functions   Sleep [x] Y  [] N  About the same.   GI/ [] Y  [] N     Appetite [] Y  [] N     Emotion and Mood   Negative Bias [x] Y  [] N  About the same.   Hedonic Capacity [] Y  [] N     Mood Dysregulation [] Y  [] N     Anxiety and Threat Perception   CSTC: Rumination [x] Y  [] N  About the same.   CSTC: Salience/Apprehen. [] Y  [] N     Amygdala: Panic/Phobic [] Y  [] N     Consciousness, Cognition and Reality Testing   Cognitive Dyscontrol [] Y  [] N     Inattention [] Y  [] N     Memory Problems [] Y  [] N     Perception Problems [] Y  [] N     Problems in Social Spheres   Home [] Y  [] N     Peers [] Y  [] N     Work [] Y  [] N     Stress [] Y  [] N       Pt did NOT display signs of nor endorse symptoms of overt psychosis or acute mood disorder requiring hospitalization during the encounter. Pt denied violent thoughts or suicidal or homicidal ideation, intent, or plan.     Objective    Measurement-Based Care (MBC):     Routine Instruments   PROMIS-ANXIETY Interpretation: 4a raw  score 16-20, T-SCORE 70+; SEVERE using 55-60-70 cutoffs. Compared to SEVERE (70+) last time, PROMIS-ANXIETY is about the same.   PROMIS-DEPRESSION Interpretation: 4a raw score 17-20, T-SCORE 70+; SEVERE using 55-60-70 cutoffs. Compared to SEVERE (70+) last time, PROMIS-DEPRESSION is about the same.   PSS4 Interpretation: 14/16; HIGH using 6-11 cutoffs. 2 PH, 1 LSE. Compared to HIGH 13/16 last time, PSS4 is about the same.   Additional Instruments   N/A     Current Evaluation of Mental Status:     Constitutional / General       Vitals:    09/14/23 1500   BP: 115/74   Pulse: 71   Weight: 76.1 kg (167 lb 12.3 oz)   Height: 5' (1.524 m)       Psychiatric / Mental Status Examination  1. Appearance: Dress is informal but appropriate. Motor activity normal.  2. Discourse: Clear speech with normal rate and volume. Associations intact. Orderly.  3. Emotional Expression: Anxious and depressed mood. Affect is appropriate.  4. Perception and Thinking: No hallucinations. No suicidality, no homicidality, delusions, or paranoia.  5. Sensorium: Grossly intact. Able to focus for interview.  6. Memory and Fund of Knowledge: Intact for content of interview.  7. Insight and Judgment: Intact.         Auto-populated Chart Data:     Medications  Outpatient Encounter Medications as of 9/14/2023   Medication Sig Dispense Refill    cyclobenzaprine (FLEXERIL) 10 MG tablet Take 10 mg by mouth nightly.      diclofenac (FLECTOR) 1.3 % PT12 APPLY 1 PATCH TOPICALLY ONCE DAILY (MAY WEAR UP TO 12HOURS.)      diclofenac sodium (SOLARAZE) 3 % gel Apply 1 application topically 2 (two) times daily.      FLUoxetine 40 MG capsule Take 40mg cap with 20mg cap (60mg total) daily. 30 capsule 1    hydroCHLOROthiazide (HYDRODIURIL) 25 MG tablet Take 1 tablet (25 mg total) by mouth once daily. 90 tablet 1    lidocaine (LIDODERM) 5 % APPLY 1 PATCH TOPICALLY ONCE DAILY (MAY WEAR UP TO 12HOURS.)      meloxicam (MOBIC) 15 MG tablet Take 15 mg by mouth once daily.       montelukast (SINGULAIR) 10 mg tablet Take 1 tablet (10 mg total) by mouth every evening. 90 tablet 1    pantoprazole (PROTONIX) 20 MG tablet Take 40 mg by mouth once daily.      potassium chloride SA (K-DUR,KLOR-CON) 20 MEQ tablet Take 1 tablet (20 mEq total) by mouth once daily. 90 tablet 1    rosuvastatin (CRESTOR) 20 MG tablet Take 1 tablet (20 mg total) by mouth once daily. 90 tablet 1    traZODone (DESYREL) 300 MG tablet Take 1 tablet (300 mg total) by mouth every evening. 30 tablet 1    [DISCONTINUED] FLUoxetine 40 MG capsule Take 1 capsule (40 mg total) by mouth once daily. 30 capsule 1    FLUoxetine 20 MG capsule Take 40mg cap with 20mg cap (60mg total) daily. 30 capsule 1    [DISCONTINUED] hydroCHLOROthiazide (HYDRODIURIL) 25 MG tablet Take 1 tablet (25 mg total) by mouth once daily. 90 tablet 0    [DISCONTINUED] irbesartan (AVAPRO) 300 MG tablet Take 1 tablet (300 mg total) by mouth every evening. (Patient not taking: Reported on 8/18/2023) 90 tablet 0    [DISCONTINUED] montelukast (SINGULAIR) 10 mg tablet TAKE 1 TABLET BY MOUTH ONCE DAILY IN THE EVENING 90 tablet 3    [DISCONTINUED] potassium chloride SA (K-DUR,KLOR-CON) 20 MEQ tablet Take 1 tablet (20 mEq total) by mouth once daily. 90 tablet 0    [DISCONTINUED] rosuvastatin (CRESTOR) 20 MG tablet Take 1 tablet (20 mg total) by mouth once daily. 90 tablet 1     No facility-administered encounter medications on file as of 9/14/2023.      The chart was reviewed for recent diagnostic procedures and investigations, and pertinent results are noted below.    Wt Readings from Last 2 Encounters:   09/14/23 76.1 kg (167 lb 12.3 oz)   08/18/23 76.9 kg (169 lb 8.5 oz)     BP Readings from Last 1 Encounters:   09/14/23 115/74     Pulse Readings from Last 1 Encounters:   09/14/23 71        Blood Counts, Electrolytes & Glucose: (i.e. WBC, ANC, Hemoglobin, Hematocrit, MCV, Platelets)  Lab Results   Component Value Date    WBC 5.07 08/16/2023    GRAN 2.8 08/16/2023  "   GRAN 55.1 08/16/2023    HGB 11.2 (L) 08/16/2023    HCT 36.7 (L) 08/16/2023    MCV 62 (L) 08/16/2023     08/16/2023     (L) 08/16/2023    K 4.4 08/16/2023    CALCIUM 9.1 08/16/2023    CO2 29 08/16/2023    ANIONGAP 6 (L) 08/16/2023    GLU 96 08/16/2023       Renal, Liver, Pancreas, Thyroid, Parathyroid, Prolactin, CPK, Lipids & Vitamin Levels: (i.e. Cr, BUN, Anion Gap, GFR, Urine Specific Gravity, Urine Protein, Microalburnin, AST, ALT, GGT, Alk Phos,Total Bili, Total Protein, Albumin, Ammonia, INR, Amylase, Lipase, TSH, Total T3, Total T4, Free T4 PTH, Prolactin, CPK, Cholesterol, Triglycerides, LDH, HDL, Vitamin B12, Folate, Vitamin D)  Lab Results   Component Value Date    CREATININE 0.8 08/16/2023    BUN 26 (H) 08/16/2023    EGFRNORACEVR >60.0 08/16/2023    AST 26 08/16/2023    ALT 31 08/16/2023    ALKPHOS 51 (L) 08/16/2023    BILITOT 0.9 08/16/2023    ALBUMIN 4.2 08/16/2023    TSH 2.710 09/22/2020    CHOL 135 08/16/2023    TRIG 56 08/16/2023    LDLCALC 85.8 08/16/2023    HDL 38 (L) 08/16/2023       Infection Diseases, Pregnancy Screenings & Drug Levels: (i.e. Hepatitis Panel, HIV, Syphilis, Urine & Blood Pregnancy Screens, beta hCG, Lithium, Valproic Acid, Carbamazepine, Lamotrigine, Phenytoin, Phenobarbital, Clozapine, Norclozapine, Clozapine + Norclozapine)   No results found for: "HAV", "HEPAIGM", "HEPBIGM", "HEPBCAB", "HBEAG", "HEPCAB", "RAPIDHIV", "HIV1X2", "DAJ74SLFC", "UT0TXNMW", "ABSOLUTECD4", "RPR", "PREGTESTUR", "PREGSERUM", "HCG", "HCGQUANT", "LITHIUM", "VALPROATE", "CBMZ", "LAMOTRIGINE", "PHENYTOIN", "PHENOBARB", "CLOZAPINE", "NORCLOZAP", "CLOZNORCLOZ"    Addiction: (i.e. Urine Toxicology, Blood Alcohol, PETH, EtG, EtS, CDT, Buprenorphine, Norbuprenorphine)  No results found for: "PCDSOALCOHOL", "PCDSOBENZOD", "BARBITURATES", "PCDSCOMETHA", "OPIATESCREEN", "COCAINEMETAB", "AMPHETAMINES", "MARIJUANATHC", "PCDSOPHENCYN", "PCDSUBUPRE", "PCDSUFENTANY", "PCDSUOXYCOD", "PCDSUTRAMA", " [id number]", "PETH", "ALCOHOLMEDIC", "THEYLGLUCU", "ETHYLSULF", "CDT", "BUPRENORPH", "NORBUPRENOR"    No results found for this or any previous visit.         Billing Documentation:     Method of Encounter IN PERSON visit at the clinic   Type of Encounter Follow up visit with me   Counseling;  Psychotherapy    Counseling;  Tobacco and/or Nicotine    Additional Codes and Modifiers 92781, with modifer 59: administered and scored more than one psychological or neuropsychological tests (see MBC above) (16+ mins)   Time Remaining Chart/Pt 11376: FOLLOW UP VISIT, Rx mgmt, "Multiple STABLE chronic illnesses"   Total Mins  (9/14/2023) N/A - Not billing for time        Juno Valdovinos DO  Department of Psychiatry, [facility]        "

## 2023-09-19 ENCOUNTER — PATIENT MESSAGE (OUTPATIENT)
Dept: PSYCHIATRY | Facility: CLINIC | Age: 53
End: 2023-09-19
Payer: MEDICAID

## 2023-09-19 NOTE — PROGRESS NOTES
Individual Psychotherapy (PhD/LCSW)    9/14/2023    Site:  [facility]         Therapeutic Intervention: Met with patient.  Outpatient - Insight oriented psychotherapy 60 min - CPT code 23974, Outpatient - Behavior modifying psychotherapy 60 min - CPT code 40945, and Outpatient - Supportive psychotherapy 60 min - CPT Code 88156    Chief complaint/reason for encounter: depression and anxiety             Interval history and content of current session: Ct was referred to tx by Dr. Valdovinos to address depression and anxiety. Ct arrived to session and was fully engaged. Ct shared that things have been the same.  Client continue to processed how her incident at work changed her entire life.  Client has physical limitations and is having difficulty finding work.  She has stress regarding her financial situation.  Client shared about the negative impact that her depression has on her life.  Client shared that she continues to seek employment but has not been look.   and client focused on acceptance and things that client can do to move forward.  Client reports that she helps her mom out with her great niece and client also reported that she tries to work out to build up muscle and feel better about herself.   encouraged client to continue to try to do as much as she could to make herself feel productive and to feel better about herself.   also provided client with employment resources.   also encouraged client to follow-up on applications that she is completing and to make a list of all the places she applied so that she can keep track of was accepted her application are who has not.  Client verbalized agreement.  Client to continue in one-to-one sessions.      Treatment plan:  Target symptoms: depression, anxiety   Why chosen therapy is appropriate versus another modality: relevant to diagnosis, patient responds to this modality, evidence based practice  Outcome monitoring  methods: self-report  Therapeutic intervention type: insight oriented psychotherapy, behavior modifying psychotherapy, supportive psychotherapy    Risk parameters:  Patient reports no suicidal ideation  Patient reports no homicidal ideation  Patient reports no self-injurious behavior  Patient reports no violent behavior    CSSRS was completed:     Mental Status Exam:  General Appearance:  unremarkable, age appropriate   Speech: normal tone, normal rate, normal pitch, normal volume      Level of Cooperation: cooperative      Thought Processes: normal and logical   Mood: steady      Thought Content: normal, no suicidality, no homicidality, delusions, or paranoia   Affect: congruent and appropriate   Orientation: Oriented x3   Memory: recent >  intact   Attention Span & Concentration: intact   Fund of General Knowledge: intact and appropriate to age and level of education   Abstract Reasoning: interpretation of similarities was abstract   Judgment & Insight: intact     Language intact       Verbal deficits: None    Patient's response to intervention:  The patient's response to intervention is accepting.    Progress toward goals and other mental status changes:  The patient's progress toward goals is fair .    Diagnosis:     ICD-10-CM ICD-9-CM   1. Severe episode of recurrent major depressive disorder, without psychotic features  F33.2 296.33   2. Generalized anxiety disorder  F41.1 300.02       Plan:  individual psychotherapy and ct to follow up with Dr. Valdovinos for psych med mgt  Pt to go to ED or call 911 if symptoms worsen or if she has thoughts of harming self and/or others. Pt verbalized understanding.    Return to clinic: as scheduled    Length of Service (minutes): 60      A portion of this note was created using [employer] voice recognition software that occasionally misinterprets phrases or words.    Each patient to whom he or she provides medical services by telemedicine is: (1) informed of the relationship  between the physician and patient and the respective role of any other health care provider with respect to management of the patient; and (2) notified that he or she may decline to receive medical services by telemedicine and may withdraw from such care at any time.

## 2023-09-28 ENCOUNTER — OFFICE VISIT (OUTPATIENT)
Dept: PSYCHIATRY | Facility: CLINIC | Age: 53
End: 2023-09-28
Payer: MEDICAID

## 2023-09-28 DIAGNOSIS — F41.1 GENERALIZED ANXIETY DISORDER: ICD-10-CM

## 2023-09-28 DIAGNOSIS — F33.1 MODERATE EPISODE OF RECURRENT MAJOR DEPRESSIVE DISORDER: Primary | ICD-10-CM

## 2023-09-28 PROCEDURE — 90834 PSYTX W PT 45 MINUTES: CPT | Mod: AJ,HB,, | Performed by: SOCIAL WORKER

## 2023-09-28 PROCEDURE — 4010F ACE/ARB THERAPY RXD/TAKEN: CPT | Mod: AJ,HB,CPTII, | Performed by: SOCIAL WORKER

## 2023-09-28 PROCEDURE — 4010F PR ACE/ARB THEARPY RXD/TAKEN: ICD-10-PCS | Mod: AJ,HB,CPTII, | Performed by: SOCIAL WORKER

## 2023-09-28 PROCEDURE — 99999 PR PBB SHADOW E&M-EST. PATIENT-LVL I: ICD-10-PCS | Mod: PBBFAC,AJ,HB, | Performed by: SOCIAL WORKER

## 2023-09-28 PROCEDURE — 99211 OFF/OP EST MAY X REQ PHY/QHP: CPT | Mod: PBBFAC,PN | Performed by: SOCIAL WORKER

## 2023-09-28 PROCEDURE — 1159F MED LIST DOCD IN RCRD: CPT | Mod: AJ,HB,CPTII, | Performed by: SOCIAL WORKER

## 2023-09-28 PROCEDURE — 1159F PR MEDICATION LIST DOCUMENTED IN MEDICAL RECORD: ICD-10-PCS | Mod: AJ,HB,CPTII, | Performed by: SOCIAL WORKER

## 2023-09-28 PROCEDURE — 99999 PR PBB SHADOW E&M-EST. PATIENT-LVL I: CPT | Mod: PBBFAC,AJ,HB, | Performed by: SOCIAL WORKER

## 2023-09-28 PROCEDURE — 90834 PR PSYCHOTHERAPY W/PATIENT, 45 MIN: ICD-10-PCS | Mod: AJ,HB,, | Performed by: SOCIAL WORKER

## 2023-10-02 NOTE — PROGRESS NOTES
Individual Psychotherapy (PhD/LCSW)    9/28/2023    Site:  [facility]         Therapeutic Intervention: Met with patient.  Outpatient - Insight oriented psychotherapy 45 min - CPT code 45729, Outpatient - Behavior modifying psychotherapy 45 min - CPT code 54719, and Outpatient - Supportive psychotherapy 45 min - CPT Code 62247    Chief complaint/reason for encounter: depression and anxiety             Interval history and content of current session: Ct was referred to tx by Dr. Valdovinos to address depression and anxiety. Ct arrived to session and was fully engaged. Ct shared that things have been the same.  She reported that she is hoping to one day be employed. She confirmed confirmation of resources that sW sent. Ct shared that she continues to go to the gym and workout as best as she can. She reported that she still takes care of her niece a couple of days per week. Ct shared about her relationship with her youngest daughter and how this relationship is stressful at times due to decisions that ct's daughter makes that don't align with ct's beliefs. SW and ct processed the importance of ct being supportive while being honest with her daughter about her thoughts. Ct was reluctant. Sw suggested to the ct that she finds things to do in addition to working out to help her focus, take care of herself, and get her closer to her goals. Ct verbalized understanding. Ct to continue in 1:1 sessions.       Treatment plan:  Target symptoms: depression, anxiety   Why chosen therapy is appropriate versus another modality: relevant to diagnosis, patient responds to this modality, evidence based practice  Outcome monitoring methods: self-report  Therapeutic intervention type: insight oriented psychotherapy, behavior modifying psychotherapy, supportive psychotherapy    Risk parameters:  Patient reports no suicidal ideation  Patient reports no homicidal ideation  Patient reports no self-injurious behavior  Patient reports no violent  behavior    CSSRS was completed: No risk    Mental Status Exam:  General Appearance:  unremarkable, age appropriate   Speech: normal tone, normal rate, normal pitch, normal volume      Level of Cooperation: cooperative      Thought Processes: normal and logical   Mood: steady      Thought Content: normal, no suicidality, no homicidality, delusions, or paranoia   Affect: congruent and appropriate   Orientation: Oriented x3   Memory: recent >  intact   Attention Span & Concentration: intact   Fund of General Knowledge: intact and appropriate to age and level of education   Abstract Reasoning: interpretation of similarities was abstract   Judgment & Insight: intact     Language intact       Verbal deficits: None    Patient's response to intervention:  The patient's response to intervention is accepting.    Progress toward goals and other mental status changes:  The patient's progress toward goals is fair .    Diagnosis:     ICD-10-CM ICD-9-CM   1. Moderate episode of recurrent major depressive disorder  F33.1 296.32   2. Generalized anxiety disorder  F41.1 300.02       Plan:  individual psychotherapy and ct to follow up with Dr. Valdovinos for psych med mgt  Pt to go to ED or call 911 if symptoms worsen or if she has thoughts of harming self and/or others. Pt verbalized understanding.    Return to clinic: as scheduled    Length of Service (minutes): 45      A portion of this note was created using [employer] voice recognition software that occasionally misinterprets phrases or words.    Each patient to whom he or she provides medical services by telemedicine is: (1) informed of the relationship between the physician and patient and the respective role of any other health care provider with respect to management of the patient; and (2) notified that he or she may decline to receive medical services by telemedicine and may withdraw from such care at any time.

## 2023-10-23 ENCOUNTER — PATIENT MESSAGE (OUTPATIENT)
Dept: PSYCHIATRY | Facility: CLINIC | Age: 53
End: 2023-10-23
Payer: MEDICAID

## 2023-10-24 ENCOUNTER — OFFICE VISIT (OUTPATIENT)
Dept: PSYCHIATRY | Facility: CLINIC | Age: 53
End: 2023-10-24
Payer: MEDICAID

## 2023-10-24 VITALS
SYSTOLIC BLOOD PRESSURE: 142 MMHG | WEIGHT: 166.88 LBS | DIASTOLIC BLOOD PRESSURE: 92 MMHG | HEIGHT: 60 IN | HEART RATE: 74 BPM | BODY MASS INDEX: 32.76 KG/M2

## 2023-10-24 DIAGNOSIS — F41.1 GENERALIZED ANXIETY DISORDER: ICD-10-CM

## 2023-10-24 DIAGNOSIS — G47.00 INSOMNIA, UNSPECIFIED TYPE: ICD-10-CM

## 2023-10-24 DIAGNOSIS — F33.1 MODERATE EPISODE OF RECURRENT MAJOR DEPRESSIVE DISORDER: Primary | ICD-10-CM

## 2023-10-24 DIAGNOSIS — F33.2 SEVERE EPISODE OF RECURRENT MAJOR DEPRESSIVE DISORDER, WITHOUT PSYCHOTIC FEATURES: Primary | ICD-10-CM

## 2023-10-24 PROCEDURE — 99211 OFF/OP EST MAY X REQ PHY/QHP: CPT | Mod: PBBFAC,27,PN | Performed by: SOCIAL WORKER

## 2023-10-24 PROCEDURE — 3080F DIAST BP >= 90 MM HG: CPT | Mod: AF,HB,CPTII, | Performed by: STUDENT IN AN ORGANIZED HEALTH CARE EDUCATION/TRAINING PROGRAM

## 2023-10-24 PROCEDURE — 1159F MED LIST DOCD IN RCRD: CPT | Mod: AF,HB,CPTII, | Performed by: STUDENT IN AN ORGANIZED HEALTH CARE EDUCATION/TRAINING PROGRAM

## 2023-10-24 PROCEDURE — 1160F PR REVIEW ALL MEDS BY PRESCRIBER/CLIN PHARMACIST DOCUMENTED: ICD-10-PCS | Mod: AF,HB,CPTII, | Performed by: STUDENT IN AN ORGANIZED HEALTH CARE EDUCATION/TRAINING PROGRAM

## 2023-10-24 PROCEDURE — 3077F SYST BP >= 140 MM HG: CPT | Mod: AF,HB,CPTII, | Performed by: STUDENT IN AN ORGANIZED HEALTH CARE EDUCATION/TRAINING PROGRAM

## 2023-10-24 PROCEDURE — 99999 PR PBB SHADOW E&M-EST. PATIENT-LVL III: CPT | Mod: PBBFAC,AF,HB, | Performed by: STUDENT IN AN ORGANIZED HEALTH CARE EDUCATION/TRAINING PROGRAM

## 2023-10-24 PROCEDURE — 4010F ACE/ARB THERAPY RXD/TAKEN: CPT | Mod: AJ,HB,CPTII, | Performed by: SOCIAL WORKER

## 2023-10-24 PROCEDURE — 3077F PR MOST RECENT SYSTOLIC BLOOD PRESSURE >= 140 MM HG: ICD-10-PCS | Mod: AF,HB,CPTII, | Performed by: STUDENT IN AN ORGANIZED HEALTH CARE EDUCATION/TRAINING PROGRAM

## 2023-10-24 PROCEDURE — 90834 PSYTX W PT 45 MINUTES: CPT | Mod: AJ,HB,, | Performed by: SOCIAL WORKER

## 2023-10-24 PROCEDURE — 99999 PR PBB SHADOW E&M-EST. PATIENT-LVL I: CPT | Mod: PBBFAC,AJ,HB, | Performed by: SOCIAL WORKER

## 2023-10-24 PROCEDURE — 99999 PR PBB SHADOW E&M-EST. PATIENT-LVL I: ICD-10-PCS | Mod: PBBFAC,AJ,HB, | Performed by: SOCIAL WORKER

## 2023-10-24 PROCEDURE — 3008F BODY MASS INDEX DOCD: CPT | Mod: AF,HB,CPTII, | Performed by: STUDENT IN AN ORGANIZED HEALTH CARE EDUCATION/TRAINING PROGRAM

## 2023-10-24 PROCEDURE — 96136 PSYCL/NRPSYC TST PHY/QHP 1ST: CPT | Mod: 59,AF,HB, | Performed by: STUDENT IN AN ORGANIZED HEALTH CARE EDUCATION/TRAINING PROGRAM

## 2023-10-24 PROCEDURE — 1159F MED LIST DOCD IN RCRD: CPT | Mod: AJ,HB,CPTII, | Performed by: SOCIAL WORKER

## 2023-10-24 PROCEDURE — 99213 OFFICE O/P EST LOW 20 MIN: CPT | Mod: PBBFAC,PN | Performed by: STUDENT IN AN ORGANIZED HEALTH CARE EDUCATION/TRAINING PROGRAM

## 2023-10-24 PROCEDURE — 4010F PR ACE/ARB THEARPY RXD/TAKEN: ICD-10-PCS | Mod: AF,HB,CPTII, | Performed by: STUDENT IN AN ORGANIZED HEALTH CARE EDUCATION/TRAINING PROGRAM

## 2023-10-24 PROCEDURE — 1159F PR MEDICATION LIST DOCUMENTED IN MEDICAL RECORD: ICD-10-PCS | Mod: AJ,HB,CPTII, | Performed by: SOCIAL WORKER

## 2023-10-24 PROCEDURE — 3008F PR BODY MASS INDEX (BMI) DOCUMENTED: ICD-10-PCS | Mod: AF,HB,CPTII, | Performed by: STUDENT IN AN ORGANIZED HEALTH CARE EDUCATION/TRAINING PROGRAM

## 2023-10-24 PROCEDURE — 99214 OFFICE O/P EST MOD 30 MIN: CPT | Mod: S$PBB,AF,HB, | Performed by: STUDENT IN AN ORGANIZED HEALTH CARE EDUCATION/TRAINING PROGRAM

## 2023-10-24 PROCEDURE — 3080F PR MOST RECENT DIASTOLIC BLOOD PRESSURE >= 90 MM HG: ICD-10-PCS | Mod: AF,HB,CPTII, | Performed by: STUDENT IN AN ORGANIZED HEALTH CARE EDUCATION/TRAINING PROGRAM

## 2023-10-24 PROCEDURE — 99214 PR OFFICE/OUTPT VISIT, EST, LEVL IV, 30-39 MIN: ICD-10-PCS | Mod: S$PBB,AF,HB, | Performed by: STUDENT IN AN ORGANIZED HEALTH CARE EDUCATION/TRAINING PROGRAM

## 2023-10-24 PROCEDURE — 90834 PR PSYCHOTHERAPY W/PATIENT, 45 MIN: ICD-10-PCS | Mod: AJ,HB,, | Performed by: SOCIAL WORKER

## 2023-10-24 PROCEDURE — 1160F RVW MEDS BY RX/DR IN RCRD: CPT | Mod: AF,HB,CPTII, | Performed by: STUDENT IN AN ORGANIZED HEALTH CARE EDUCATION/TRAINING PROGRAM

## 2023-10-24 PROCEDURE — 96136 PR PSYCH/NEUROPSYCH TEST ADMIN/SCORING, 2+ TESTS, 1ST 30 MIN: ICD-10-PCS | Mod: 59,AF,HB, | Performed by: STUDENT IN AN ORGANIZED HEALTH CARE EDUCATION/TRAINING PROGRAM

## 2023-10-24 PROCEDURE — 4010F ACE/ARB THERAPY RXD/TAKEN: CPT | Mod: AF,HB,CPTII, | Performed by: STUDENT IN AN ORGANIZED HEALTH CARE EDUCATION/TRAINING PROGRAM

## 2023-10-24 PROCEDURE — 1159F PR MEDICATION LIST DOCUMENTED IN MEDICAL RECORD: ICD-10-PCS | Mod: AF,HB,CPTII, | Performed by: STUDENT IN AN ORGANIZED HEALTH CARE EDUCATION/TRAINING PROGRAM

## 2023-10-24 PROCEDURE — 4010F PR ACE/ARB THEARPY RXD/TAKEN: ICD-10-PCS | Mod: AJ,HB,CPTII, | Performed by: SOCIAL WORKER

## 2023-10-24 PROCEDURE — 99999 PR PBB SHADOW E&M-EST. PATIENT-LVL III: ICD-10-PCS | Mod: PBBFAC,AF,HB, | Performed by: STUDENT IN AN ORGANIZED HEALTH CARE EDUCATION/TRAINING PROGRAM

## 2023-10-24 RX ORDER — FLUOXETINE HYDROCHLORIDE 40 MG/1
CAPSULE ORAL
Qty: 30 CAPSULE | Refills: 1 | Status: SHIPPED | OUTPATIENT
Start: 2023-10-24 | End: 2024-02-20 | Stop reason: SDUPTHER

## 2023-10-24 RX ORDER — FLUOXETINE HYDROCHLORIDE 20 MG/1
CAPSULE ORAL
Qty: 30 CAPSULE | Refills: 1 | Status: SHIPPED | OUTPATIENT
Start: 2023-10-24 | End: 2024-02-20 | Stop reason: SDUPTHER

## 2023-10-24 RX ORDER — TRAZODONE HYDROCHLORIDE 300 MG/1
300 TABLET ORAL NIGHTLY
Qty: 30 TABLET | Refills: 1 | Status: SHIPPED | OUTPATIENT
Start: 2023-10-24 | End: 2023-12-12 | Stop reason: DRUGHIGH

## 2023-10-24 NOTE — PROGRESS NOTES
"    Outpatient Psychiatry Followup Visit (DO/MD/NP)    10/24/2023  Assessment & Plan    Assessment - Plan:     Impression   10/24/2023 (3) Ongoing treatment of primary symptoms with some favorable progress.     ICD-10-CM ICD-9-CM   1. Severe episode of recurrent major depressive disorder, without psychotic features  F33.2 296.33   2. Generalized anxiety disorder  F41.1 300.02   3. Insomnia, unspecified type  G47.00 780.52   4. BMI 32.0-32.9,adult  Z68.32 V85.32        Plan of Care & Medication Management    Chart was reviewed. The risks and benefits of medication were discussed with pt. The treatment plan and followup plan were reviewed with pt. Pt understands to contact clinic if symptoms worsen. Pt understands to call 911 or go to nearest ER for suicidal ideation, intent or plan.   RX History CYMBALTA, EFFEXOR (didn't work), LEXAPRO, PROZAC,   TRAZODONE, TRINTELLIX (didn't work), VIIBRYD (didn't work) and WELLBUTRIN   Current RX 10/24/2023: DDAS 36714; RX "UNCOMMON OR MULTIPLE ANTIDEPRESSANTS", RE 23%  Considering a TCA as second line treatment: PAMELOR, ELAVIL, DESIPRAMINE  Considering augmentation with antipsychotic medication ABILIFY, SEROQUEL, LATUDA or GEODON  Considering BUSPAR or LAMICTAL as an adjunctive agent  Continue PROZAC  Useful for symptom coverage of anxiety and depression with low risk for weight gain.  Does not share pharmacokinetic profile of previously tried antidepressants  Pt was provided [facility]I educational material 7/12/2023.  Adjustments:  [id number]: Increase to 60mg daily  14ZYX5439: Increase to 40mg daily  9TGV7792: Start 20mg daily (after 1 week washout from VIIBRYD)  Prior to [id number] pt had been taking  TRAZODONE 250mg HS and VIIBRYD 40mg daily  Continue TRAZODONE  Adjustments:  75OYF0138: Increase to 300mg HS  92TCI9745: Increase to 250mg HS  19ZUB8895: Increase to 200mg HS  47PEJ5793: Increase to 150mg HS  38NWD7483: Increase to 100mg HS  78ELH8555: Increase to 50mg HS " "(scheduled)  Prior to evaluation pt had been taking 50mg HS PRN INSOMNIA   Education & Counseling RX administration and adherence   Other Orders Pharmacogenomics panel (serum) - See CPIC Guidelines Below  Considering the following medications:  CITALOPRAM is CPIC level A (1A evidence with actionable PGx for gene [id number])  DESIPRAMINE is CPIC level B (1A evidence with actionable PGx for gene CYP2D6)  ELAVIL is CPIC level A (1A evidence for genes CYP2C9 and CYP2D6)  PAMELOR is CPIC level A (1A evidence with actionable PGx for gene CYP2D6)  PAXIL is CPIC level A (1A evidence with informative PGx for gene CYP2D6)  ZOLOFT is CPIC level A/B (1A evidence for genes WYO0D60 and CYP2B6)   Monitor VITAL SIGNS  Instruments: PROMIS (A&D), PSS4   RETURN K: RETURN IN 4 WEEKS (ONE MONTH), and reassess frequency within three visits from now  Continue medication management.     Data-Driven Antidepressant Selection (DDAS)   RX "UNCOMMON OR MULTIPLE ANTIDEPRESSANTS"   RE 23%   Out of 16,700 possible treatments, this case was matched to one treatment option using data from over 10 million treatments. "RX" is the best antidepressant medication (or medications) for people with a similar history as the patient in this case. "RE" refers to the percentage of patients with a similar history to this patient who saw an improvement in at least 50% of their depression symptoms with this particular "RX." This "[facility]" report is retrievable on hi.[facility]/ad/getID using report number [id number]       Subjective    Interval History - Review of Systems (ROS):     Available documentation has been reviewed, and pertinent elements of the chart have been incorporated into this note where appropriate.   1/18/2023 : first Epic encounter with this clinic  9/28/2023 : last Epic encounter with this clinic  9/14/2023 : last Epic encounter with this writer   Mirtha Stewart, a 53 y.o. female, presenting for followup visit.  "     Since last visit, reports overall about the same.    BP a bit high. Happier. 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 Brighter this visit.    Does not notice much of a difference with PROZAC dose.    Sleeping about the same.    Discussed GENESIGHT, collected sample. Will be covered by insurance due per IC guidelines.       Pt did NOT display signs of nor endorse symptoms of overt psychosis or acute mood disorder requiring hospitalization during the encounter. Pt denied violent thoughts or suicidal or homicidal ideation, intent, or plan.         Objective    Measurement-Based Care (MBC):     Routine Instruments   PROMIS-ANXIETY Interpretation: 4a raw score 16, T-SCORE 71.2; SEVERE using 55-60-70 cutoffs. Compared to SEVERE (70+) last time, PROMIS-ANXIETY shows NO significant change.   PROMIS-DEPRESSION Interpretation: 4a raw score 18, T-SCORE 73.3; SEVERE using 55-60-70 cutoffs. Compared to SEVERE (70+) last time, PROMIS-DEPRESSION shows NO significant change.   PSS4 Interpretation: 14/16; HIGH using 6-11 cutoffs. 2 PH, 1 LSE. Last PSS4 score was 14. This PSS4 score change of less than 4 points indicates the score is probably stable.   Additional Instruments   N/A     Current Evaluation of Mental Status:     Constitutional / General       Vitals:    10/24/23 1503   BP: (!) 142/92   Pulse: 74   Weight: 75.7 kg (166 lb 14.2 oz)   Height: 5' (1.524 m)       Psychiatric / Mental Status Examination  1. Appearance: Dress is informal but appropriate. Motor activity normal.  2. Discourse: Clear speech with normal rate and volume. Associations intact. Orderly.  3. Emotional Expression: Somewhat anxious and depressed mood. Affect is appropriate.  4. Perception and Thinking: No hallucinations. No suicidality, no homicidality, delusions, or paranoia.  5. Sensorium: Grossly intact. Able to focus for interview.  6. Memory and Fund of Knowledge: Intact for content of interview.  7.  "Insight and Judgment: Intact.               Billing Documentation:     Method of Encounter IN PERSON visit at the clinic   Type of Encounter Follow up visit with me   Counseling;  Psychotherapy    Counseling;  Tobacco and/or Nicotine    Additional Codes and Modifiers 73125, with modifer 59: administered and scored more than one psychological or neuropsychological tests (see MBC above) (16+ mins)   Time Remaining Chart/Pt 43648: FOLLOW UP VISIT, Rx mgmt, "Multiple STABLE chronic illnesses"   Total Mins  (10/24/2023) N/A - Not billing for time        Juno Valdovinos DO  Department of Psychiatry, [facility]        "

## 2023-10-25 NOTE — PROGRESS NOTES
Individual Psychotherapy (PhD/LCSW)    10/24/2023    Site:  [place]         Therapeutic Intervention: Met with patient.  Outpatient - Insight oriented psychotherapy 45 min - CPT code 68897, Outpatient - Behavior modifying psychotherapy 45 min - CPT code 92053, and Outpatient - Supportive psychotherapy 45 min - CPT Code 43994    Chief complaint/reason for encounter: depression and anxiety             Interval history and content of current session:  Ct was referred to tx by Dr. Valdovinos to address depression and anxiety. Ct arrived to session and was fully engaged. 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 SW encouraged ct to look in to multiple avenues to accomplish her dream and to not limit herself to 1 solution. Ct verbalized understanding. Ct shared that she continues to help her neighbor out at times with rides to the store. She continues to workout within her limitations to help with movement and mood. SW provided ct with resources for small businesses. Ct to continue in 1:1 sessions.       Treatment plan:  Target symptoms: depression, anxiety   Why chosen therapy is appropriate versus another modality: relevant to diagnosis, patient responds to this modality, evidence based practice  Outcome monitoring methods: self-report  Therapeutic intervention type: insight oriented psychotherapy, behavior modifying psychotherapy, supportive psychotherapy    Risk parameters:  Patient reports no suicidal ideation  Patient reports no homicidal ideation  Patient reports no self-injurious behavior  Patient reports no violent behavior    CSSRS was completed: No risk    Mental Status Exam:  General Appearance:  unremarkable, age appropriate   Speech: normal tone, normal rate, normal pitch, normal volume      Level of Cooperation: cooperative      Thought Processes: normal  and logical   Mood: steady      Thought Content: normal, no suicidality, no homicidality, delusions, or paranoia   Affect: congruent and appropriate   Orientation: Oriented x3   Memory: recent >  intact   Attention Span & Concentration: intact   Fund of General Knowledge: intact and appropriate to age and level of education   Abstract Reasoning: interpretation of similarities was abstract   Judgment & Insight: fair, intact     Language intact       Verbal deficits: None    Patient's response to intervention:  The patient's response to intervention is accepting.    Progress toward goals and other mental status changes:  The patient's progress toward goals is fair , some improvement .    Diagnosis:     ICD-10-CM ICD-9-CM   1. Moderate episode of recurrent major depressive disorder  F33.1 296.32   2. Generalized anxiety disorder  F41.1 300.02       Plan:  individual psychotherapy and ct to follow up with Dr. Valdovinos for psych med mgt  Pt to go to ED or call 911 if symptoms worsen or if she has thoughts of harming self and/or others. Pt verbalized understanding.    Return to clinic: as scheduled    Length of Service (minutes): 45      A portion of this note was created using [employer] voice recognition software that occasionally misinterprets phrases or words.    Each patient to whom he or she provides medical services by telemedicine is: (1) informed of the relationship between the physician and patient and the respective role of any other health care provider with respect to management of the patient; and (2) notified that he or she may decline to receive medical services by telemedicine and may withdraw from such care at any time.

## 2023-10-27 ENCOUNTER — DOCUMENTATION ONLY (OUTPATIENT)
Dept: PSYCHIATRY | Facility: CLINIC | Age: 53
End: 2023-10-27
Payer: MEDICAID

## 2023-10-27 NOTE — PROGRESS NOTES
"    Pharmacogenomics Note (Psychiatry)    10/27/2023    Source:     Patient's GeneSight report was reviewed. Genomic indicators updated on Epic.     Content:     PD   HTR2A ([id number]) (-1438A>G) G/G - Increased risk of SSRI ADEs (42%)  SLC6A4 L/S - Reduced response to SSRIs; may respond better to REMERON or PAMELOR   PK   XIG2U10 INTERMEDIATE METABOLIZER  CYP2C9 INTERMEDIATE METABOLIZER  UGT1A4 ULTRARAPID METABOLIZER   OTHER   COMT: YULIA/MET (GA)  MTHFR C677T ([id number]) C/C: (Normal Activity) GeneSight: "This individual is homozygous for the C allele of the C677T polymorphism in the MTHFR gene. This genotype is associated with normal folic acid metabolism, normal serum folate levels, and normal homocysteine levels.     Response:     No gene-drug interactions reported for prescribed TRAZODONE.  Prescribed PROZAC has MODERATE GENE-DRUG INTERACTIONS  Clinical Considerations:  1: Serum level may be too high, lower doses may be required.   SLC6A4 suggests reduced response.  Considering these gene-drug interactions, would consider the following medication options:  REMERON  When choosing a TCA, could use either PAMELOR or DESIPRAMINE.  Could augment with any of the following:  ABILIFY  SEROQUEL  LATUDA  GEODON  BUSPAR  Certain medications under consideration should be avoided:  PAXIL  ZOLOFT  CELEXELENI GARCIA  Will discuss these results with patient next visit        Juno Valdovinos,   Department of Psychiatry        "

## 2023-11-15 ENCOUNTER — HOSPITAL ENCOUNTER (EMERGENCY)
Facility: HOSPITAL | Age: 53
Discharge: HOME | End: 2023-11-15
Attending: EMERGENCY MEDICINE
Payer: MEDICAID

## 2023-11-15 ENCOUNTER — TELEPHONE (OUTPATIENT)
Dept: FAMILY MEDICINE | Facility: CLINIC | Age: 53
End: 2023-11-15
Payer: MEDICAID

## 2023-11-15 VITALS
TEMPERATURE: 98 F | BODY MASS INDEX: 30.43 KG/M2 | SYSTOLIC BLOOD PRESSURE: 139 MMHG | HEART RATE: 82 BPM | RESPIRATION RATE: 20 BRPM | DIASTOLIC BLOOD PRESSURE: 86 MMHG | WEIGHT: 155 LBS | HEIGHT: 60 IN | OXYGEN SATURATION: 99 %

## 2023-11-15 DIAGNOSIS — L02.91 ABSCESS: Primary | ICD-10-CM

## 2023-11-15 PROCEDURE — 99284 EMERGENCY DEPT VISIT MOD MDM: CPT | Mod: 25

## 2023-11-15 PROCEDURE — 87070 CULTURE OTHR SPECIMN AEROBIC: CPT | Performed by: EMERGENCY MEDICINE

## 2023-11-15 PROCEDURE — 63600175 PHARM REV CODE 636 W HCPCS: Performed by: EMERGENCY MEDICINE

## 2023-11-15 PROCEDURE — 10160 PNXR ASPIR ABSC HMTMA BULLA: CPT

## 2023-11-15 PROCEDURE — 25000003 PHARM REV CODE 250: Performed by: EMERGENCY MEDICINE

## 2023-11-15 PROCEDURE — 87186 SC STD MICRODIL/AGAR DIL: CPT | Performed by: EMERGENCY MEDICINE

## 2023-11-15 PROCEDURE — 87147 CULTURE TYPE IMMUNOLOGIC: CPT | Performed by: EMERGENCY MEDICINE

## 2023-11-15 PROCEDURE — 87077 CULTURE AEROBIC IDENTIFY: CPT | Performed by: EMERGENCY MEDICINE

## 2023-11-15 PROCEDURE — 96372 THER/PROPH/DIAG INJ SC/IM: CPT | Performed by: EMERGENCY MEDICINE

## 2023-11-15 RX ORDER — CEFTRIAXONE 1 G/1
1 INJECTION, POWDER, FOR SOLUTION INTRAMUSCULAR; INTRAVENOUS
Status: COMPLETED | OUTPATIENT
Start: 2023-11-15 | End: 2023-11-15

## 2023-11-15 RX ORDER — CLINDAMYCIN HYDROCHLORIDE 150 MG/1
300 CAPSULE ORAL 4 TIMES DAILY
Qty: 56 CAPSULE | Refills: 0 | Status: SHIPPED | OUTPATIENT
Start: 2023-11-15 | End: 2023-11-22

## 2023-11-15 RX ORDER — CLINDAMYCIN PHOSPHATE 150 MG/ML
600 INJECTION, SOLUTION INTRAVENOUS
Status: DISCONTINUED | OUTPATIENT
Start: 2023-11-15 | End: 2023-11-15

## 2023-11-15 RX ORDER — ACETAMINOPHEN 500 MG
1000 TABLET ORAL
Status: COMPLETED | OUTPATIENT
Start: 2023-11-15 | End: 2023-11-15

## 2023-11-15 RX ORDER — CLINDAMYCIN HYDROCHLORIDE 150 MG/1
600 CAPSULE ORAL
Status: COMPLETED | OUTPATIENT
Start: 2023-11-15 | End: 2023-11-15

## 2023-11-15 RX ADMIN — CLINDAMYCIN HYDROCHLORIDE 600 MG: 150 CAPSULE ORAL at 05:11

## 2023-11-15 RX ADMIN — ACETAMINOPHEN 1000 MG: 500 TABLET ORAL at 05:11

## 2023-11-15 RX ADMIN — CEFTRIAXONE SODIUM 1 G: 1 INJECTION, POWDER, FOR SOLUTION INTRAMUSCULAR; INTRAVENOUS at 05:11

## 2023-11-15 NOTE — ED PROVIDER NOTES
Encounter Date: 11/15/2023       History     Chief Complaint   Patient presents with    Abscess     Had abscess to right nare; states popped it and now red and painful     HPI 53-year-old woman who presents emergency department complaining of a pimple that she popped over the weekend on her right nose and now has had worsening pain redness and swelling to the right side of her nose radiating to the right face and right ear.  No associated fever, no antibiotic use.  Review of patient's allergies indicates:   Allergen Reactions    Lexapro [escitalopram] Nausea Only     Dizziness, humming in ears, shakes     Past Medical History:   Diagnosis Date    PUD (peptic ulcer disease)     teenager     Past Surgical History:   Procedure Laterality Date     SECTION      hysterctomy      KNEE SURGERY      *3     Family History   Problem Relation Age of Onset    Breast cancer Mother      Social History     Tobacco Use    Smoking status: Never    Smokeless tobacco: Never   Substance Use Topics    Alcohol use: Yes     Comment: OCC    Drug use: Not Currently     Review of Systems   Constitutional:  Negative for fever.   HENT:  Positive for ear pain. Negative for sore throat.    Respiratory:  Negative for shortness of breath.    Cardiovascular:  Negative for chest pain.   Gastrointestinal:  Negative for nausea.   Genitourinary:  Negative for dysuria.   Musculoskeletal:  Negative for back pain.   Skin:  Positive for rash.   Neurological:  Negative for weakness.   Hematological:  Does not bruise/bleed easily.       Physical Exam     Initial Vitals [11/15/23 1649]   BP Pulse Resp Temp SpO2   139/86 82 20 98.3 °F (36.8 °C) 99 %      MAP       --         Physical Exam    Nursing note and vitals reviewed.  Constitutional: She appears well-developed and well-nourished. No distress.   HENT:   Head: Normocephalic and atraumatic.   There is swelling with induration and erythema to the right side of her nasolabial fold and nose.  No  facial swelling or erythema.  External ears with normal auditory canals and tympanic membranes.  No trismus.   Eyes: EOM are normal. Pupils are equal, round, and reactive to light.   Neck: Neck supple.   Pulmonary/Chest: No respiratory distress.   Musculoskeletal:         General: Normal range of motion.      Cervical back: Neck supple.     Neurological: She is alert and oriented to person, place, and time.   Skin: Skin is warm and dry.         ED Course   Abscess Aspiration    Date/Time: 11/15/2023 4:36 PM    Performed by: Michael Chase MD  Authorized by: Michael Chase MD    A time out verifies correct patient, procedure, equipment, support staff and site/side marked as required:   Procedure Details:     Site prepped with:  Chlorhexadine    Location of Abscess #1:  Right nose    Size of needle #1:  18    Aspirated amount #1 (mL):  0.2  Material send for:  Aerobic Culture  Post-procedure:     Patient tolerance:  Patient tolerated the procedure well with no immediate complications    Labs Reviewed   CULTURE, AEROBIC  (SPECIFY SOURCE)          Imaging Results    None          Medications   clindamycin capsule 600 mg (600 mg Oral Given 11/15/23 1738)   cefTRIAXone injection 1 g (1 g Intramuscular Given 11/15/23 1738)   acetaminophen tablet 1,000 mg (1,000 mg Oral Given 11/15/23 1737)     Medical Decision Making  This is an emergent evaluation of a patient with complaints of a skin infection.    I decided to obtain and review the old medical record.    Clinically the patient has an abscess on her right nose.  There is no facial erythema, no fever.. The patient's abscess has been aspirated and purulence expressed.  She received Rocephin IM and clindamycin orally in the emergency department The patient will be placed on antibiotics- clindamycin.  The patient has no signs of systemic symptoms or significant cellulitis to warrant admission at this time.  I've given the patient specific return  precautions.    Wound care has been discussed.      The results and physical exam findings were reviewed with the patient. Pt agrees with assessment, disposition and treatment plan and has no further questions or complaints at this time.    Michael Chase M.D. 5:57 PM 11/15/2023            Risk  OTC drugs.  Prescription drug management.                               Clinical Impression:   Final diagnoses:  [L02.91] Abscess (Primary)        ED Disposition Condition    Discharge Stable          ED Prescriptions       Medication Sig Dispense Start Date End Date Auth. Provider    clindamycin (CLEOCIN) 150 MG capsule Take 2 capsules (300 mg total) by mouth 4 (four) times daily. for 7 days 56 capsule 11/15/2023 11/22/2023 Michael Chase MD          Follow-up Information       Follow up With Specialties Details Why Contact Info Additional Information    [facility]  As needed, If symptoms worsen [address] [place] [address] [address] [place] [address] 1st floor             Michael Chase MD  11/15/23 1158

## 2023-11-16 ENCOUNTER — TELEPHONE (OUTPATIENT)
Dept: FAMILY MEDICINE | Facility: CLINIC | Age: 53
End: 2023-11-16

## 2023-11-16 NOTE — TELEPHONE ENCOUNTER
----- Message from Brayden Connors sent at 11/15/2023 10:10 AM CST -----  Type:  Sooner Appointment Request    Caller is requesting a sooner appointment.  Caller declined first available appointment listed below.  Caller will not accept being placed on the waitlist and is requesting a message be sent to doctor.    Name of Caller:  pt  When is the first available appointment?  1/11-said she need to be seen sooner--please call and advise  Symptoms:  pimple on nose/infected  Would the patient rather a call back or a response via [facility]? call  Best Call Back Number:  [phone] (home)     Additional Information:  thank you

## 2023-11-16 NOTE — TELEPHONE ENCOUNTER
----- Message from Purvi Yu sent at 11/16/2023  2:34 PM CST -----  Contact: PT  Type:  Patient Returning Call    Who Called:  PT  Who Left Message for Patient:  Dany  Does the patient know what this is regarding?:  yes  Best Call Back Number:  [phone]  Additional Information:

## 2023-11-19 LAB — BACTERIA SPEC AEROBE CULT: ABNORMAL

## 2023-11-20 ENCOUNTER — TELEPHONE (OUTPATIENT)
Dept: FAMILY MEDICINE | Facility: CLINIC | Age: 53
End: 2023-11-20
Payer: MEDICAID

## 2023-11-28 ENCOUNTER — OFFICE VISIT (OUTPATIENT)
Dept: PSYCHIATRY | Facility: CLINIC | Age: 53
End: 2023-11-28
Payer: MEDICAID

## 2023-11-28 VITALS
SYSTOLIC BLOOD PRESSURE: 126 MMHG | HEIGHT: 60 IN | WEIGHT: 170 LBS | HEART RATE: 75 BPM | BODY MASS INDEX: 33.38 KG/M2 | DIASTOLIC BLOOD PRESSURE: 86 MMHG

## 2023-11-28 DIAGNOSIS — G47.00 INSOMNIA, UNSPECIFIED TYPE: ICD-10-CM

## 2023-11-28 DIAGNOSIS — F33.2 SEVERE EPISODE OF RECURRENT MAJOR DEPRESSIVE DISORDER, WITHOUT PSYCHOTIC FEATURES: Primary | ICD-10-CM

## 2023-11-28 DIAGNOSIS — F41.1 GENERALIZED ANXIETY DISORDER: ICD-10-CM

## 2023-11-28 PROCEDURE — 3079F DIAST BP 80-89 MM HG: CPT | Mod: AF,HB,CPTII, | Performed by: STUDENT IN AN ORGANIZED HEALTH CARE EDUCATION/TRAINING PROGRAM

## 2023-11-28 PROCEDURE — 3074F SYST BP LT 130 MM HG: CPT | Mod: AF,HB,CPTII, | Performed by: STUDENT IN AN ORGANIZED HEALTH CARE EDUCATION/TRAINING PROGRAM

## 2023-11-28 PROCEDURE — 99999 PR PBB SHADOW E&M-EST. PATIENT-LVL III: CPT | Mod: PBBFAC,AF,HB, | Performed by: STUDENT IN AN ORGANIZED HEALTH CARE EDUCATION/TRAINING PROGRAM

## 2023-11-28 PROCEDURE — 1160F RVW MEDS BY RX/DR IN RCRD: CPT | Mod: AF,HB,CPTII, | Performed by: STUDENT IN AN ORGANIZED HEALTH CARE EDUCATION/TRAINING PROGRAM

## 2023-11-28 PROCEDURE — 99213 OFFICE O/P EST LOW 20 MIN: CPT | Mod: PBBFAC,PN | Performed by: STUDENT IN AN ORGANIZED HEALTH CARE EDUCATION/TRAINING PROGRAM

## 2023-11-28 PROCEDURE — 1160F PR REVIEW ALL MEDS BY PRESCRIBER/CLIN PHARMACIST DOCUMENTED: ICD-10-PCS | Mod: AF,HB,CPTII, | Performed by: STUDENT IN AN ORGANIZED HEALTH CARE EDUCATION/TRAINING PROGRAM

## 2023-11-28 PROCEDURE — 1159F MED LIST DOCD IN RCRD: CPT | Mod: AF,HB,CPTII, | Performed by: STUDENT IN AN ORGANIZED HEALTH CARE EDUCATION/TRAINING PROGRAM

## 2023-11-28 PROCEDURE — 4010F ACE/ARB THERAPY RXD/TAKEN: CPT | Mod: AF,HB,CPTII, | Performed by: STUDENT IN AN ORGANIZED HEALTH CARE EDUCATION/TRAINING PROGRAM

## 2023-11-28 PROCEDURE — 3008F BODY MASS INDEX DOCD: CPT | Mod: AF,HB,CPTII, | Performed by: STUDENT IN AN ORGANIZED HEALTH CARE EDUCATION/TRAINING PROGRAM

## 2023-11-28 PROCEDURE — 99214 PR OFFICE/OUTPT VISIT, EST, LEVL IV, 30-39 MIN: ICD-10-PCS | Mod: S$PBB,AF,HB, | Performed by: STUDENT IN AN ORGANIZED HEALTH CARE EDUCATION/TRAINING PROGRAM

## 2023-11-28 PROCEDURE — 96136 PR PSYCH/NEUROPSYCH TEST ADMIN/SCORING, 2+ TESTS, 1ST 30 MIN: ICD-10-PCS | Mod: 59,AF,HB, | Performed by: STUDENT IN AN ORGANIZED HEALTH CARE EDUCATION/TRAINING PROGRAM

## 2023-11-28 PROCEDURE — 3008F PR BODY MASS INDEX (BMI) DOCUMENTED: ICD-10-PCS | Mod: AF,HB,CPTII, | Performed by: STUDENT IN AN ORGANIZED HEALTH CARE EDUCATION/TRAINING PROGRAM

## 2023-11-28 PROCEDURE — 96136 PSYCL/NRPSYC TST PHY/QHP 1ST: CPT | Mod: 59,AF,HB, | Performed by: STUDENT IN AN ORGANIZED HEALTH CARE EDUCATION/TRAINING PROGRAM

## 2023-11-28 PROCEDURE — 3079F PR MOST RECENT DIASTOLIC BLOOD PRESSURE 80-89 MM HG: ICD-10-PCS | Mod: AF,HB,CPTII, | Performed by: STUDENT IN AN ORGANIZED HEALTH CARE EDUCATION/TRAINING PROGRAM

## 2023-11-28 PROCEDURE — 3074F PR MOST RECENT SYSTOLIC BLOOD PRESSURE < 130 MM HG: ICD-10-PCS | Mod: AF,HB,CPTII, | Performed by: STUDENT IN AN ORGANIZED HEALTH CARE EDUCATION/TRAINING PROGRAM

## 2023-11-28 PROCEDURE — 99999 PR PBB SHADOW E&M-EST. PATIENT-LVL III: ICD-10-PCS | Mod: PBBFAC,AF,HB, | Performed by: STUDENT IN AN ORGANIZED HEALTH CARE EDUCATION/TRAINING PROGRAM

## 2023-11-28 PROCEDURE — 4010F PR ACE/ARB THEARPY RXD/TAKEN: ICD-10-PCS | Mod: AF,HB,CPTII, | Performed by: STUDENT IN AN ORGANIZED HEALTH CARE EDUCATION/TRAINING PROGRAM

## 2023-11-28 PROCEDURE — 1159F PR MEDICATION LIST DOCUMENTED IN MEDICAL RECORD: ICD-10-PCS | Mod: AF,HB,CPTII, | Performed by: STUDENT IN AN ORGANIZED HEALTH CARE EDUCATION/TRAINING PROGRAM

## 2023-11-28 PROCEDURE — 99214 OFFICE O/P EST MOD 30 MIN: CPT | Mod: S$PBB,AF,HB, | Performed by: STUDENT IN AN ORGANIZED HEALTH CARE EDUCATION/TRAINING PROGRAM

## 2023-11-28 RX ORDER — BUSPIRONE HYDROCHLORIDE 5 MG/1
5 TABLET ORAL 2 TIMES DAILY
Qty: 60 TABLET | Refills: 1 | Status: SHIPPED | OUTPATIENT
Start: 2023-11-28 | End: 2023-12-12 | Stop reason: ALTCHOICE

## 2023-11-28 NOTE — PATIENT INSTRUCTIONS
Start BUSPAR 5mg twice per day, try to separate doses by about 12 hours  Stop taking and reach out to the clinic for worsening of insomnia, confusion, agitation or restlessness, tremors, rising blood pressure, sweating or rapid heart rate.    Continue other medications as prescribed

## 2023-11-28 NOTE — PROGRESS NOTES
"    Outpatient Psychiatry Followup Visit (DO/MD/NP, etc.)    11/28/2023  Assessment & Plan    Assessment - Plan:     Impression     ICD-10-CM ICD-9-CM   1. Severe episode of recurrent major depressive disorder, without psychotic features  F33.2 296.33   2. Generalized anxiety disorder  F41.1 300.02   3. Insomnia, unspecified type  G47.00 780.52   4. BMI 33.0-33.9,adult  Z68.33 V85.33        Plan of Care & Medication Management    Chart was reviewed. The risks and benefits of medication were discussed with pt. The treatment plan and followup plan were reviewed with pt. Pt understands to contact clinic if symptoms worsen. Pt understands to call 911 or go to nearest ER for suicidal ideation, intent or plan.   RX History CYMBALTA, EFFEXOR (didn't work), LEXAPRO (nausea, dizzy, tinnitus), PROZAC (dry mouth),   TRAZODONE, TRINTELLIX (didn't work), VIIBRYD (didn't work) and WELLBUTRIN (NO response at 300mg)   Current RX 10/24/2023: DDAS 78526; RX "UNCOMMON OR MULTIPLE ANTIDEPRESSANTS", RE 23%  PG on chart  28NOV2023: Decided on starting augmentation with BUSPAR, reassess in 2 weeks. Plans B and C: Depending on response, will consider ABILIFY as alternative. If both fail, considering switching PROZAC to PAMELOR  Start BUSPAR  Pt was provided [employer] educational material 11/28/2023.  11/28/2023: Discussed standard serotonin syndrome contingency plan: Stop taking and reach out to the clinic for worsening of insomnia, confusion, agitation or restlessness, tremors, rising blood pressure, sweating or rapid heart rate.  Adjustments:  28NOV2023: Start 5mg BID  Continue PROZAC  Adjustments:  95QMT1302: Increase to 60mg daily  32WVD5806: Increase to 40mg daily  6YJH3661: Start 20mg daily (after 1 week washout from VIIBRYD)  Prior to 12JUL2023 pt had been taking  TRAZODONE 250mg HS and VIIBRYD 40mg daily  Continue TRAZODONE  Adjustments:  58IML7340: Increase to 300mg HS  13NBY6394: Increase to 250mg HS  80ISG8738: Increase to 200mg " "HS  24HQP0077: Increase to 150mg HS  74RWI0374: Increase to 100mg HS  21YRB4607: Increase to 50mg HS (scheduled)  Prior to evaluation pt had been taking 50mg HS PRN INSOMNIA   Education & Counseling RX administration and adherence   Other Orders Continue individual psychotherapy   Monitor VITAL SIGNS  Instruments: PROMIS (A&D), PSS4   RETURN F: RETURN IN 2 WEEKS, and reassess frequency next visit  Continue medication management.     Subjective    Interval History:     Available documentation has been reviewed, and pertinent elements of the chart have been incorporated into this note where appropriate. Last [facility] encounter with writer was on 10/24/2023   Mirtha Stewart, a 53 y.o. female, presenting for followup visit.      Since last visit, reports overall about the same.  Declines IOP  Exercising  Extensive discussion of [employer] testing results and medication options  Cognitive problems, so would NOT increase TRAZODONE  PD and PK for PROZAC, so would NOT increase PROZAC  Discussed starting BUSPAR, with plans B and C (see above)  Pt also taking "Hydroxy Elite" supplement for fat loss - Garcinia increases risk for serotonin syndrome  Discussed warning signs, contingency plan for serotonin syndrome       Pt did NOT display signs of nor endorse symptoms of overt psychosis or acute mood disorder requiring hospitalization during the encounter. Pt denied violent thoughts or suicidal or homicidal ideation, intent, or plan.         Objective    Measurement-Based Care (MBC):     Routine Instruments   PROMIS-ANXIETY Interpretation: 4a raw score 18, T-SCORE 75.4; SEVERE using 55-60-70 cutoffs. Last T-SCORE was 71.2. This PROMIS T-score change of 5 points or fewer indicates the score is probably stable.   PROMIS-DEPRESSION Interpretation: 4a raw score 20, T-SCORE 79.4; SEVERE using 55-60-70 cutoffs. Last T-SCORE was 73.3. This PROMIS T-score worsening of more than 5 points is a WORSENING by more than a half standard " "deviation.   PSS4 Interpretation: 14/16; HIGH using 6-11 cutoffs. 2 PH, 2 LSE. Last PSS4 score was 14. This PSS4 score change of less than 4 points indicates the score is probably stable.   Additional Instruments   N/A     Current Evaluation of Mental Status:     Constitutional / General       Vitals:    11/28/23 1506   BP: 126/86   Pulse: 75   Weight: 77.1 kg (169 lb 15.6 oz)   Height: 5' (1.524 m)       Psychiatric / Mental Status Examination  1. Appearance: Dress is informal but appropriate. Motor activity normal.  2. Discourse: Clear speech with normal rate and volume. Associations intact. Orderly.  3. Emotional Expression: Anxious and depressed mood. Affect is appropriate.  4. Perception and Thinking: No hallucinations. No suicidality, no homicidality, delusions, or paranoia.  5. Sensorium: Grossly intact. Able to focus for interview.  6. Memory and Fund of Knowledge: Intact for content of interview.  7. Insight and Judgment: Intact.               Billing Documentation:     Method of Encounter IN PERSON visit at the clinic   Type of Encounter Follow up visit with me   Counseling;  Psychotherapy    Counseling;  Tobacco and/or Nicotine    Additional Codes and Modifiers 57948, with modifer 59: administered and scored more than one psychological or neuropsychological tests (see MBC above) (16+ mins)   Time Remaining Chart/Pt 31997: FOLLOW UP VISIT, Rx mgmt, "Multiple STABLE chronic illnesses"   Total Mins  (11/28/2023) N/A - Not billing for time        Juno Valdovinos DO  Department of Psychiatry, [facility]        "

## 2023-12-12 ENCOUNTER — OFFICE VISIT (OUTPATIENT)
Dept: PSYCHIATRY | Facility: CLINIC | Age: 53
End: 2023-12-12
Payer: MEDICAID

## 2023-12-12 VITALS
BODY MASS INDEX: 33.02 KG/M2 | HEART RATE: 73 BPM | WEIGHT: 168.19 LBS | HEIGHT: 60 IN | DIASTOLIC BLOOD PRESSURE: 79 MMHG | SYSTOLIC BLOOD PRESSURE: 129 MMHG

## 2023-12-12 DIAGNOSIS — F33.2 SEVERE EPISODE OF RECURRENT MAJOR DEPRESSIVE DISORDER, WITHOUT PSYCHOTIC FEATURES: Primary | ICD-10-CM

## 2023-12-12 DIAGNOSIS — F33.1 MODERATE EPISODE OF RECURRENT MAJOR DEPRESSIVE DISORDER: Primary | ICD-10-CM

## 2023-12-12 DIAGNOSIS — F41.1 GENERALIZED ANXIETY DISORDER: ICD-10-CM

## 2023-12-12 DIAGNOSIS — G47.00 INSOMNIA, UNSPECIFIED TYPE: ICD-10-CM

## 2023-12-12 PROCEDURE — 99211 OFF/OP EST MAY X REQ PHY/QHP: CPT | Mod: PBBFAC,27,PN | Performed by: SOCIAL WORKER

## 2023-12-12 PROCEDURE — 3008F BODY MASS INDEX DOCD: CPT | Mod: AF,HB,CPTII, | Performed by: STUDENT IN AN ORGANIZED HEALTH CARE EDUCATION/TRAINING PROGRAM

## 2023-12-12 PROCEDURE — 99214 OFFICE O/P EST MOD 30 MIN: CPT | Mod: S$PBB,AF,HB, | Performed by: STUDENT IN AN ORGANIZED HEALTH CARE EDUCATION/TRAINING PROGRAM

## 2023-12-12 PROCEDURE — 1159F PR MEDICATION LIST DOCUMENTED IN MEDICAL RECORD: ICD-10-PCS | Mod: AF,HB,CPTII, | Performed by: STUDENT IN AN ORGANIZED HEALTH CARE EDUCATION/TRAINING PROGRAM

## 2023-12-12 PROCEDURE — 1160F RVW MEDS BY RX/DR IN RCRD: CPT | Mod: AF,HB,CPTII, | Performed by: STUDENT IN AN ORGANIZED HEALTH CARE EDUCATION/TRAINING PROGRAM

## 2023-12-12 PROCEDURE — 3078F PR MOST RECENT DIASTOLIC BLOOD PRESSURE < 80 MM HG: ICD-10-PCS | Mod: AF,HB,CPTII, | Performed by: STUDENT IN AN ORGANIZED HEALTH CARE EDUCATION/TRAINING PROGRAM

## 2023-12-12 PROCEDURE — 96136 PSYCL/NRPSYC TST PHY/QHP 1ST: CPT | Mod: 59,AF,HB, | Performed by: STUDENT IN AN ORGANIZED HEALTH CARE EDUCATION/TRAINING PROGRAM

## 2023-12-12 PROCEDURE — 99999 PR PBB SHADOW E&M-EST. PATIENT-LVL I: ICD-10-PCS | Mod: PBBFAC,AJ,HB, | Performed by: SOCIAL WORKER

## 2023-12-12 PROCEDURE — 3074F PR MOST RECENT SYSTOLIC BLOOD PRESSURE < 130 MM HG: ICD-10-PCS | Mod: AF,HB,CPTII, | Performed by: STUDENT IN AN ORGANIZED HEALTH CARE EDUCATION/TRAINING PROGRAM

## 2023-12-12 PROCEDURE — 99999 PR PBB SHADOW E&M-EST. PATIENT-LVL I: CPT | Mod: PBBFAC,AJ,HB, | Performed by: SOCIAL WORKER

## 2023-12-12 PROCEDURE — 3074F SYST BP LT 130 MM HG: CPT | Mod: AF,HB,CPTII, | Performed by: STUDENT IN AN ORGANIZED HEALTH CARE EDUCATION/TRAINING PROGRAM

## 2023-12-12 PROCEDURE — 96136 PR PSYCH/NEUROPSYCH TEST ADMIN/SCORING, 2+ TESTS, 1ST 30 MIN: ICD-10-PCS | Mod: 59,AF,HB, | Performed by: STUDENT IN AN ORGANIZED HEALTH CARE EDUCATION/TRAINING PROGRAM

## 2023-12-12 PROCEDURE — 4010F PR ACE/ARB THEARPY RXD/TAKEN: ICD-10-PCS | Mod: AF,HB,CPTII, | Performed by: STUDENT IN AN ORGANIZED HEALTH CARE EDUCATION/TRAINING PROGRAM

## 2023-12-12 PROCEDURE — 1160F PR REVIEW ALL MEDS BY PRESCRIBER/CLIN PHARMACIST DOCUMENTED: ICD-10-PCS | Mod: AF,HB,CPTII, | Performed by: STUDENT IN AN ORGANIZED HEALTH CARE EDUCATION/TRAINING PROGRAM

## 2023-12-12 PROCEDURE — 90837 PR PSYCHOTHERAPY W/PATIENT, 60 MIN: ICD-10-PCS | Mod: AJ,HB,, | Performed by: SOCIAL WORKER

## 2023-12-12 PROCEDURE — 4010F ACE/ARB THERAPY RXD/TAKEN: CPT | Mod: AJ,HB,CPTII, | Performed by: SOCIAL WORKER

## 2023-12-12 PROCEDURE — 99999 PR PBB SHADOW E&M-EST. PATIENT-LVL III: ICD-10-PCS | Mod: PBBFAC,AF,HB, | Performed by: STUDENT IN AN ORGANIZED HEALTH CARE EDUCATION/TRAINING PROGRAM

## 2023-12-12 PROCEDURE — 90837 PSYTX W PT 60 MINUTES: CPT | Mod: AJ,HB,, | Performed by: SOCIAL WORKER

## 2023-12-12 PROCEDURE — 4010F ACE/ARB THERAPY RXD/TAKEN: CPT | Mod: AF,HB,CPTII, | Performed by: STUDENT IN AN ORGANIZED HEALTH CARE EDUCATION/TRAINING PROGRAM

## 2023-12-12 PROCEDURE — 99213 OFFICE O/P EST LOW 20 MIN: CPT | Mod: PBBFAC,PN | Performed by: STUDENT IN AN ORGANIZED HEALTH CARE EDUCATION/TRAINING PROGRAM

## 2023-12-12 PROCEDURE — 1159F MED LIST DOCD IN RCRD: CPT | Mod: AF,HB,CPTII, | Performed by: STUDENT IN AN ORGANIZED HEALTH CARE EDUCATION/TRAINING PROGRAM

## 2023-12-12 PROCEDURE — 4010F PR ACE/ARB THEARPY RXD/TAKEN: ICD-10-PCS | Mod: AJ,HB,CPTII, | Performed by: SOCIAL WORKER

## 2023-12-12 PROCEDURE — 1159F PR MEDICATION LIST DOCUMENTED IN MEDICAL RECORD: ICD-10-PCS | Mod: AJ,HB,CPTII, | Performed by: SOCIAL WORKER

## 2023-12-12 PROCEDURE — 3078F DIAST BP <80 MM HG: CPT | Mod: AF,HB,CPTII, | Performed by: STUDENT IN AN ORGANIZED HEALTH CARE EDUCATION/TRAINING PROGRAM

## 2023-12-12 PROCEDURE — 99214 PR OFFICE/OUTPT VISIT, EST, LEVL IV, 30-39 MIN: ICD-10-PCS | Mod: S$PBB,AF,HB, | Performed by: STUDENT IN AN ORGANIZED HEALTH CARE EDUCATION/TRAINING PROGRAM

## 2023-12-12 PROCEDURE — 3008F PR BODY MASS INDEX (BMI) DOCUMENTED: ICD-10-PCS | Mod: AF,HB,CPTII, | Performed by: STUDENT IN AN ORGANIZED HEALTH CARE EDUCATION/TRAINING PROGRAM

## 2023-12-12 PROCEDURE — 99999 PR PBB SHADOW E&M-EST. PATIENT-LVL III: CPT | Mod: PBBFAC,AF,HB, | Performed by: STUDENT IN AN ORGANIZED HEALTH CARE EDUCATION/TRAINING PROGRAM

## 2023-12-12 PROCEDURE — 1159F MED LIST DOCD IN RCRD: CPT | Mod: AJ,HB,CPTII, | Performed by: SOCIAL WORKER

## 2023-12-12 RX ORDER — TRAZODONE HYDROCHLORIDE 150 MG/1
TABLET ORAL
Qty: 30 TABLET | Refills: 1 | Status: SHIPPED | OUTPATIENT
Start: 2023-12-12 | End: 2024-02-20

## 2023-12-12 RX ORDER — TRAZODONE HYDROCHLORIDE 100 MG/1
TABLET ORAL
Qty: 30 TABLET | Refills: 1 | Status: SHIPPED | OUTPATIENT
Start: 2023-12-12 | End: 2024-02-20

## 2023-12-12 RX ORDER — TRAZODONE HYDROCHLORIDE 50 MG/1
TABLET ORAL
Qty: 30 TABLET | Refills: 1 | Status: SHIPPED | OUTPATIENT
Start: 2023-12-12 | End: 2024-02-20

## 2023-12-12 RX ORDER — ARIPIPRAZOLE 2 MG/1
2 TABLET ORAL DAILY
Qty: 30 TABLET | Refills: 1 | Status: SHIPPED | OUTPATIENT
Start: 2023-12-12 | End: 2024-01-11 | Stop reason: SINTOL

## 2023-12-12 NOTE — PROGRESS NOTES
"    Outpatient Psychiatry Followup Visit (DO/MD/NP, etc.)    12/12/2023  Assessment & Plan    Assessment - Plan:     Impression     ICD-10-CM ICD-9-CM   1. Severe episode of recurrent major depressive disorder, without psychotic features  F33.2 296.33   2. Generalized anxiety disorder  F41.1 300.02   3. Insomnia, unspecified type  G47.00 780.52   4. BMI 32.0-32.9,adult  Z68.32 V85.32        Plan of Care & Medication Management    Chart was reviewed. The risks and benefits of medication were discussed with pt. The treatment plan and followup plan were reviewed with pt. Pt understands to contact clinic if symptoms worsen. Pt understands to call 911 or go to nearest ER for suicidal ideation, intent or plan.   RX History CYMBALTA, BUSPAR (NO response), EFFEXOR (didn't work), LEXAPRO (nausea, dizzy, tinnitus), PROZAC (dry mouth), TRAZODONE, TRINTELLIX (didn't work), VIIBRYD (didn't work) and WELLBUTRIN (NO response at 300mg)   Current RX 10/24/2023: DDAS 20336; RX "UNCOMMON OR MULTIPLE ANTIDEPRESSANTS", RE 23%  PG on chart  28NOV2023: Decided on starting augmentation with BUSPAR, reassess in 2 weeks. Plans B and C: Depending on response, will consider ABILIFY as alternative. If both fail, considering switching PROZAC to PAMELOR  Start ABILIFY  Pt was provided [employer] educational material 12/12/2023.  12/12/2023: Discussed standard serotonin syndrome contingency plan: Stop taking and reach out to the clinic for worsening of insomnia, confusion, agitation or restlessness, tremors, rising blood pressure, sweating or rapid heart rate.  Metabolic monitoring:  AUG2023 lipids acceptable  12DEC2023 BMI 32.8  A1C needed  Adjustments:  34DUA0468: Start 2mg daily  Discontinue BUSPAR  Adjustments:  77IXE9461: Discontinue (ineffective)  28NOV2023: Start 5mg BID  Continue PROZAC  Adjustments:  85RSU6404: Increase to 60mg daily  08TPI0772: Increase to 40mg daily  0TPP3971: Start 20mg daily (after 1 week washout from VIIBRYD)  Prior to " 14IDT9199 pt had been taking  TRAZODONE 250mg HS and VIIBRYD 40mg daily  Adjust TRAZODONE  Monitor for daytime sedation  Adjustments:  65CHW6497: Adjust to 250mg HS, 50mg daily  13CRQ9425: Increase to 300mg HS  78BXX7104: Increase to 250mg HS  73UEF3433: Increase to 200mg HS  00TQX3543: Increase to 150mg HS  68HTX9388: Increase to 100mg HS  05WEO4707: Increase to 50mg HS (scheduled)  Prior to evaluation pt had been taking 50mg HS PRN INSOMNIA   Education & Counseling RX administration and adherence   Other Orders Continue individual psychotherapy   Monitor VITAL SIGNS  Instruments: PROMIS (A&D), PSS4   RETURN J: RETURN IN 3 WEEKS, and reassess frequency next visit  Continue medication management.     Subjective    Interval History:     Available documentation has been reviewed, and pertinent elements of the chart have been incorporated into this note where appropriate. Last Epic encounter with writer was on 11/28/2023   Mirtha Stewart, a 53 y.o. female, presenting for followup visit.      Since last visit, reports overall about the same.    Friends living with patient, causing stress.    NO difference since starting BUSPAR. Exercising.    BP is good.    Discussed replacing BUSPAR with ABILIFY.  Discussed adjusting TRAZODONE to BID to better address anxiety, but monitor for daytime sedation.    Declines IOP.       Pt did NOT display signs of nor endorse symptoms of overt psychosis or acute mood disorder requiring hospitalization during the encounter. Pt denied violent thoughts or suicidal or homicidal ideation, intent, or plan.         Objective    Measurement-Based Care (MBC):     Routine Instruments   PROMIS-ANXIETY Interpretation: 4a raw score 19, T-SCORE 77.9; SEVERE using 55-60-70 cutoffs. Last T-SCORE was 75.4. This PROMIS T-score change of 5 points or fewer indicates the score is probably stable.   PROMIS-DEPRESSION Interpretation: 4a raw score 20, T-SCORE 79.4; SEVERE using 55-60-70 cutoffs. Last T-SCORE  "was 79.4. This PROMIS T-score change of 5 points or fewer indicates the score is probably stable.   PSS4 Interpretation: 12/16; HIGH using 6-11 cutoffs. 1 PH, 2 LSE. Last PSS4 score was 14. This PSS4 score change of less than 4 points indicates the score is probably stable.   Additional Instruments   N/A     Current Evaluation of Mental Status:     Constitutional / General       Vitals:    12/12/23 1504   BP: 129/79   Pulse: 73   Weight: 76.3 kg (168 lb 3.4 oz)   Height: 5' (1.524 m)       Psychiatric / Mental Status Examination  1. Appearance: Dress is informal but appropriate. Motor activity normal.  2. Discourse: Clear speech with normal rate and volume. Associations intact. Orderly.  3. Emotional Expression: Anxious and depressed mood. Affect is appropriate.  4. Perception and Thinking: No hallucinations. No suicidality, no homicidality, delusions, or paranoia.  5. Sensorium: Grossly intact. Able to focus for interview.  6. Memory and Fund of Knowledge: Intact for content of interview.  7. Insight and Judgment: Intact.         Auto-populated chart data omitted from this note for brevity.      Billing Documentation:     Method of Encounter IN PERSON visit at the clinic   Type of Encounter Follow up visit with me   Counseling;  Psychotherapy    Counseling;  Tobacco and/or Nicotine    Additional Codes and Modifiers 96617, with modifer 59: administered and scored more than one psychological or neuropsychological tests (see MBC above) (16+ mins)   Time Remaining Chart/Pt 51090: FOLLOW UP VISIT, Rx mgmt, "Multiple STABLE chronic illnesses"   Total Mins  (12/12/2023) N/A - Not billing for time        Juno Valdovinos DO  Department of Psychiatry, [facility]        "

## 2023-12-12 NOTE — PATIENT INSTRUCTIONS
Discontinue BUSPAR  Start ABILIFY 2mg daily  Adjust TRAZODONE to 50mg in the morning and 250mg in the evening  Continue other medications as prescribed   Keep therapy appointments     Stop taking and reach out to the clinic for worsening of insomnia, confusion, agitation or restlessness, tremors, rising blood pressure, sweating or rapid heart rate.

## 2023-12-13 NOTE — PROGRESS NOTES
Individual Psychotherapy (PhD/LCSW)    12/12/2023    Site:  [employer]         Therapeutic Intervention: Met with patient.  Outpatient - Insight oriented psychotherapy 60 min - CPT code 45168, Outpatient - Behavior modifying psychotherapy 60 min - CPT code 11300, and Outpatient - Supportive psychotherapy 60 min - CPT Code 70769    Chief complaint/reason for encounter: depression and anxiety             Interval history and content of current session: Ct was referred to tx by Dr. Valdovinos to address depression and anxiety. Ct arrived to session and was fully engaged.  Ct shared that things have been okay. She reported that she continues to help her neighbor and now she has allowed her neighbor's granddaughter move in with her. Ct shared that she feels sorry for the granddaguhter who had a rough life. Ct shared that it has been a challenge because the granddaughter is 19 and is engaging in risky behaviors. Ct believes that the granddaugher has DD. SW encouraged ct to set boundaries and recognize that she can help but it is not her responsibility to fix this issue. SW encourasged ct that if she feels that the granddaughter needs help but has not support and can not take care of herself that she should call adult protection. Ct verbalized understanding. SW and ct processed how ct is going thought a lot herself. Ct shared that she has not been to the gym in 2 weeks. SW and ct processed the importance of ct going to the gym and how it helps her with her stress.  Ct to continue in 1:1 sessions.       Treatment plan:  Target symptoms: depression, anxiety   Why chosen therapy is appropriate versus another modality: relevant to diagnosis, patient responds to this modality, evidence based practice  Outcome monitoring methods: self-report  Therapeutic intervention type: insight oriented psychotherapy, behavior modifying psychotherapy, supportive psychotherapy    Risk parameters:  Patient reports no suicidal ideation  Patient reports  no homicidal ideation  Patient reports no self-injurious behavior  Patient reports no violent behavior    CSSRS was completed:     Mental Status Exam:  General Appearance:  unremarkable, age appropriate   Speech: normal tone, normal rate, normal pitch, normal volume      Level of Cooperation: cooperative      Thought Processes: normal and logical   Mood: steady      Thought Content: normal, no suicidality, no homicidality, delusions, or paranoia   Affect: congruent and appropriate   Orientation: Oriented x3   Memory: recent >  intact   Attention Span & Concentration: intact   Fund of General Knowledge: intact and appropriate to age and level of education   Abstract Reasoning: interpretation of similarities was abstract   Judgment & Insight: fair, intact     Language intact       Verbal deficits: None    Patient's response to intervention:  The patient's response to intervention is accepting.    Progress toward goals and other mental status changes:  The patient's progress toward goals is fair .    Diagnosis:     ICD-10-CM ICD-9-CM   1. Moderate episode of recurrent major depressive disorder  F33.1 296.32   2. Generalized anxiety disorder  F41.1 300.02       Plan:  individual psychotherapy and ct to follow up with Dr. Valdovinos for psych med mgt  Pt to go to ED or call 911 if symptoms worsen or if she has thoughts of harming self and/or others. Pt verbalized understanding.    Return to clinic: as scheduled    Length of Service (minutes): 60      A portion of this note was created using [employer] voice recognition software that occasionally misinterprets phrases or words.    Each patient to whom he or she provides medical services by telemedicine is: (1) informed of the relationship between the physician and patient and the respective role of any other health care provider with respect to management of the patient; and (2) notified that he or she may decline to receive medical services by telemedicine and may withdraw from  such care at any time.

## 2024-01-05 ENCOUNTER — PATIENT MESSAGE (OUTPATIENT)
Dept: ADMINISTRATIVE | Facility: HOSPITAL | Age: 54
End: 2024-01-05
Payer: MEDICAID

## 2024-01-11 ENCOUNTER — OFFICE VISIT (OUTPATIENT)
Dept: PSYCHIATRY | Facility: CLINIC | Age: 54
End: 2024-01-11
Payer: MEDICAID

## 2024-01-11 VITALS
HEIGHT: 60 IN | DIASTOLIC BLOOD PRESSURE: 82 MMHG | HEART RATE: 70 BPM | BODY MASS INDEX: 32.55 KG/M2 | WEIGHT: 165.81 LBS | SYSTOLIC BLOOD PRESSURE: 124 MMHG

## 2024-01-11 DIAGNOSIS — F33.1 MODERATE EPISODE OF RECURRENT MAJOR DEPRESSIVE DISORDER: Primary | ICD-10-CM

## 2024-01-11 DIAGNOSIS — F41.1 GENERALIZED ANXIETY DISORDER: ICD-10-CM

## 2024-01-11 DIAGNOSIS — G47.00 INSOMNIA, UNSPECIFIED TYPE: ICD-10-CM

## 2024-01-11 DIAGNOSIS — F43.21 ADJUSTMENT DISORDER WITH DEPRESSED MOOD: ICD-10-CM

## 2024-01-11 DIAGNOSIS — F33.2 SEVERE EPISODE OF RECURRENT MAJOR DEPRESSIVE DISORDER, WITHOUT PSYCHOTIC FEATURES: Primary | ICD-10-CM

## 2024-01-11 PROCEDURE — 1159F MED LIST DOCD IN RCRD: CPT | Mod: AJ,HB,CPTII, | Performed by: SOCIAL WORKER

## 2024-01-11 PROCEDURE — 3008F BODY MASS INDEX DOCD: CPT | Mod: AF,HB,CPTII, | Performed by: STUDENT IN AN ORGANIZED HEALTH CARE EDUCATION/TRAINING PROGRAM

## 2024-01-11 PROCEDURE — 1160F RVW MEDS BY RX/DR IN RCRD: CPT | Mod: AF,HB,CPTII, | Performed by: STUDENT IN AN ORGANIZED HEALTH CARE EDUCATION/TRAINING PROGRAM

## 2024-01-11 PROCEDURE — 1159F MED LIST DOCD IN RCRD: CPT | Mod: AF,HB,CPTII, | Performed by: STUDENT IN AN ORGANIZED HEALTH CARE EDUCATION/TRAINING PROGRAM

## 2024-01-11 PROCEDURE — 99211 OFF/OP EST MAY X REQ PHY/QHP: CPT | Mod: PBBFAC,27,PN | Performed by: SOCIAL WORKER

## 2024-01-11 PROCEDURE — 90834 PSYTX W PT 45 MINUTES: CPT | Mod: AJ,HB,, | Performed by: SOCIAL WORKER

## 2024-01-11 PROCEDURE — 99999 PR PBB SHADOW E&M-EST. PATIENT-LVL III: CPT | Mod: PBBFAC,AF,HB, | Performed by: STUDENT IN AN ORGANIZED HEALTH CARE EDUCATION/TRAINING PROGRAM

## 2024-01-11 PROCEDURE — 96136 PSYCL/NRPSYC TST PHY/QHP 1ST: CPT | Mod: 59,AF,HB, | Performed by: STUDENT IN AN ORGANIZED HEALTH CARE EDUCATION/TRAINING PROGRAM

## 2024-01-11 PROCEDURE — 3079F DIAST BP 80-89 MM HG: CPT | Mod: AF,HB,CPTII, | Performed by: STUDENT IN AN ORGANIZED HEALTH CARE EDUCATION/TRAINING PROGRAM

## 2024-01-11 PROCEDURE — 99999 PR PBB SHADOW E&M-EST. PATIENT-LVL I: CPT | Mod: PBBFAC,AJ,HB, | Performed by: SOCIAL WORKER

## 2024-01-11 PROCEDURE — 3074F SYST BP LT 130 MM HG: CPT | Mod: AF,HB,CPTII, | Performed by: STUDENT IN AN ORGANIZED HEALTH CARE EDUCATION/TRAINING PROGRAM

## 2024-01-11 PROCEDURE — 99213 OFFICE O/P EST LOW 20 MIN: CPT | Mod: PBBFAC,PN | Performed by: STUDENT IN AN ORGANIZED HEALTH CARE EDUCATION/TRAINING PROGRAM

## 2024-01-11 PROCEDURE — 99214 OFFICE O/P EST MOD 30 MIN: CPT | Mod: S$PBB,AF,HB, | Performed by: STUDENT IN AN ORGANIZED HEALTH CARE EDUCATION/TRAINING PROGRAM

## 2024-01-11 RX ORDER — LURASIDONE HYDROCHLORIDE 20 MG/1
20 TABLET, FILM COATED ORAL DAILY
Qty: 30 TABLET | Refills: 1 | Status: SHIPPED | OUTPATIENT
Start: 2024-01-11 | End: 2024-02-20 | Stop reason: DRUGHIGH

## 2024-01-11 NOTE — PATIENT INSTRUCTIONS
Replace ABILIFY with LATUDA 20mg daily  Continue other medications as prescribed   Keep therapy appointments

## 2024-01-11 NOTE — PROGRESS NOTES
"    Outpatient Psychiatry Followup Visit (DO/MD/NP, etc.)    1/11/2024  Assessment & Plan    Assessment - Plan:     Impression     ICD-10-CM ICD-9-CM   1. Severe episode of recurrent major depressive disorder, without psychotic features  F33.2 296.33   2. Generalized anxiety disorder  F41.1 300.02   3. Insomnia, unspecified type  G47.00 780.52   4. BMI 32.0-32.9,adult  Z68.32 V85.32        Plan of Care & Medication Management    Chart was reviewed. The risks and benefits of medication were discussed with pt. The treatment plan and followup plan were reviewed with pt. Pt understands to contact clinic if symptoms worsen. Pt understands to call 911 or go to nearest ER for suicidal ideation, intent or plan.   RX History ABILIFY (affected driving), CYMBALTA, BUSPAR (NO response), EFFEXOR (didn't work), LEXAPRO (nausea, dizzy, tinnitus), PROZAC (dry mouth), TRAZODONE, TRINTELLIX (didn't work), VIIBRYD (didn't work) and WELLBUTRIN (NO response at 300mg)    Current RX 10/24/2023: DDAS 30840; RX "UNCOMMON OR MULTIPLE ANTIDEPRESSANTS", RE 23%  PG on chart  28NOV2023: Decided on starting augmentation with BUSPAR, reassess in 2 weeks. Plans B and C: Depending on response, will consider ABILIFY as alternative. If both fail, considering switching PROZAC to PAMELOR  Discontinue ABILIFY - Replace with LATUDA  Pt was provided [facility] educational material 12/12/2023.  12/12/2023: Discussed standard serotonin syndrome contingency plan: Stop taking and reach out to the clinic for worsening of insomnia, confusion, agitation or restlessness, tremors, rising blood pressure, sweating or rapid heart rate.  Metabolic monitoring:  AUG2023 lipids acceptable  12DEC2023 BMI 32.8  A1C needed  Adjustments:  12DEC2023: Start 2mg daily  Start LATUDA  Pt was provided [facility] educational material 1/11/2024.  1/11/2024, 12/12/2023: Discussed standard serotonin syndrome contingency plan: Stop taking and reach out to the clinic for worsening of insomnia, " confusion, agitation or restlessness, tremors, rising blood pressure, sweating or rapid heart rate.  Metabolic monitoring:  AUG2023 lipids acceptable  41DCH3038 BMI 32.8  A1C needed  Adjustments:  11JAN2024: Start 20mg daily with 350+ Cals  Prior to JAN2024 pt attempted a trial of ABILIFY  Continue PROZAC  Adjustments:  90YCB0618: Increase to 60mg daily  59BLZ5456: Increase to 40mg daily  3FMC8361: Start 20mg daily (after 1 week washout from VIIBRYD)  Prior to 75QFA5778 pt had been taking  TRAZODONE 250mg HS and VIIBRYD 40mg daily  Continue TRAZODONE  Monitor for daytime sedation  Adjustments:  50DGN0004: Adjust to 250mg HS, 50mg daily  67CSV0648: Increase to 300mg HS  44CGR8431: Increase to 250mg HS  12PKP6173: Increase to 200mg HS  47CJQ1034: Increase to 150mg HS  82VHI1423: Increase to 100mg HS  35FOX3339: Increase to 50mg HS (scheduled)  Prior to evaluation pt had been taking 50mg HS PRN INSOMNIA   Education & Counseling RX administration and adherence   Other Orders Continue individual psychotherapy   Monitor VITAL SIGNS  Instruments: PROMIS (A&D), PSS4   RETURN H: RETURN IN 3 WEEKS, and reassess frequency two visits from now  Continue medication management     Subjective    Interval History:     Available documentation has been reviewed, and pertinent elements of the chart have been incorporated into this note where appropriate. Last Epic encounter with writer was on 12/12/2023   Mirtha Stewart, a 53 y.o. female, presenting for followup visit.      Since last visit, reports overall about the same.    Stopped taking ABILIFY because difficulty driving and leaning. Hit curbs. Was feeling a bit better but stopped taking ABILIFY.    Declines medication for anxiety.    Sleeping fine.    Depression still about the same.    Keeping therapy appts    Discussed replacing ABILIFY with LATUDA       Pt did NOT display signs of nor endorse symptoms of overt psychosis or acute mood disorder requiring hospitalization during  the encounter. Pt denied violent thoughts or suicidal or homicidal ideation, intent, or plan.         Objective    Measurement-Based Care (MBC):     Routine Instruments   PROMIS-ANXIETY Interpretation: 4a raw score 19, T-SCORE 77.9; SEVERE using 55-60-70 cutoffs. Last T-SCORE was 77.9. This PROMIS T-score change of 5 points or fewer indicates the score is probably stable.   PROMIS-DEPRESSION Interpretation: 4a raw score 18, T-SCORE 73.3; SEVERE using 55-60-70 cutoffs. Last T-SCORE was 79.4. This PROMIS T-score improvement of more than 5 points is an IMPROVEMENT by more than a half standard deviation.   PSS4 Interpretation: 14/16; HIGH using 6-11 cutoffs. 2 PH, 1 LSE. Last PSS4 score was 12. This PSS4 score change of less than 4 points indicates the score is probably stable.   Additional Instruments   N/A     Current Evaluation of Mental Status:     Constitutional / General       Vitals:    01/11/24 1457   BP: 124/82   Pulse: 70   Weight: 75.2 kg (165 lb 12.6 oz)   Height: 5' (1.524 m)       Psychiatric / Mental Status Examination  1. Appearance: Dress is informal but appropriate. Motor activity normal.  2. Discourse: Clear speech with normal rate and volume. Associations intact. Orderly.  3. Emotional Expression: Anxious and depressed mood. Affect is appropriate.  4. Perception and Thinking: No hallucinations. No suicidality, no homicidality, delusions, or paranoia.  5. Sensorium: Grossly intact. Able to focus for interview.  6. Memory and Fund of Knowledge: Intact for content of interview.  7. Insight and Judgment: Intact.               Billing Documentation:     Method of Encounter IN PERSON visit at the clinic   Type of Encounter Follow up visit with me   Counseling;  Psychotherapy    Counseling;  Tobacco and/or Nicotine    Additional Codes and Modifiers 87385, with modifer 59: administered and scored more than one psychological or neuropsychological tests (see MBC above) (16+ mins)   Time Remaining Chart/Pt  "34116: FOLLOW UP VISIT, Rx mgmt, "Multiple STABLE chronic illnesses"   Total Mins  (1/11/2024) N/A - Not billing for time        Juno Valdovinos DO  Department of Psychiatry, [facility]        "

## 2024-01-12 NOTE — PROGRESS NOTES
Individual Psychotherapy (PhD/LCSW)    1/11/2024    Site:  [facility]         Therapeutic Intervention: Met with patient.  Outpatient - Insight oriented psychotherapy 45min - CPT code 10012, Outpatient - Behavior modifying psychotherapy 45 min - CPT code 35040, and Outpatient - Supportive psychotherapy 45 min - CPT Code 46316    Chief complaint/reason for encounter: depression and anxiety             Interval history and content of current session: Ct was referred to tx by Dr. Valdovinos to address depression and anxiety. Ct arrived to session and was fully engaged.  Ct shared that things have been okay. She reported that not much has changed since her last visit. Ct shared that she continues to help her neighbor out. She shared that she has a final evaluation regarding her physical issues in a couple of weeks. She shared how at that time she will have more insight as to what her next step would be. Ct shared about the financial instability and how stressful it is. SW and ct reviewed Maslow's Hierarchy of needs and how financial stability is part of our human need for safety. Ct shared that contributes to her depression and anxiety. SW and ct processed the importance of ct recognizing her strengths and recognizing that she is doing the best she can within her situation. Ct to continue in 1:1 sessions.       Treatment plan:  Target symptoms: depression, anxiety , adjustment  Why chosen therapy is appropriate versus another modality: relevant to diagnosis, patient responds to this modality, evidence based practice  Outcome monitoring methods: self-report  Therapeutic intervention type: insight oriented psychotherapy, behavior modifying psychotherapy, supportive psychotherapy    Risk parameters:  Patient reports no suicidal ideation  Patient reports no homicidal ideation  Patient reports no self-injurious behavior  Patient reports no violent behavior    CSSRS was completed:     Mental Status Exam:  General Appearance:   unremarkable, age appropriate   Speech: normal tone, normal rate, normal pitch, normal volume      Level of Cooperation: cooperative      Thought Processes: normal and logical   Mood: steady      Thought Content: normal, no suicidality, no homicidality, delusions, or paranoia   Affect: congruent and appropriate   Orientation: Oriented x3   Memory: recent >  intact   Attention Span & Concentration: intact   Fund of General Knowledge: intact and appropriate to age and level of education   Abstract Reasoning: interpretation of similarities was abstract   Judgment & Insight: fair, intact     Language intact       Verbal deficits: None    Patient's response to intervention:  The patient's response to intervention is accepting.    Progress toward goals and other mental status changes:  The patient's progress toward goals is fair , stable .    Diagnosis:     ICD-10-CM ICD-9-CM   1. Moderate episode of recurrent major depressive disorder  F33.1 296.32   2. Generalized anxiety disorder  F41.1 300.02   3. Adjustment disorder with depressed mood  F43.21 309.0       Plan:  individual psychotherapy and ct to follow up with Dr. Valdovinos for psych med mgt  Pt to go to ED or call 911 if symptoms worsen or if she has thoughts of harming self and/or others. Pt verbalized understanding.    Return to clinic: as scheduled    Length of Service (minutes): 45      A portion of this note was created using Droid system master voice recognition software that occasionally misinterprets phrases or words.    Each patient to whom he or she provides medical services by telemedicine is: (1) informed of the relationship between the physician and patient and the respective role of any other health care provider with respect to management of the patient; and (2) notified that he or she may decline to receive medical services by telemedicine and may withdraw from such care at any time.

## 2024-01-29 ENCOUNTER — HOSPITAL ENCOUNTER (EMERGENCY)
Facility: HOSPITAL | Age: 54
Discharge: HOME | End: 2024-01-29
Attending: EMERGENCY MEDICINE
Payer: MEDICAID

## 2024-01-29 VITALS
HEART RATE: 64 BPM | HEIGHT: 60 IN | RESPIRATION RATE: 17 BRPM | DIASTOLIC BLOOD PRESSURE: 75 MMHG | BODY MASS INDEX: 32.39 KG/M2 | WEIGHT: 165 LBS | TEMPERATURE: 98 F | SYSTOLIC BLOOD PRESSURE: 132 MMHG | OXYGEN SATURATION: 98 %

## 2024-01-29 DIAGNOSIS — S62.111A TRIQUETRAL CHIP FRACTURE, RIGHT, CLOSED, INITIAL ENCOUNTER: Primary | ICD-10-CM
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
## 2024-01-29 DIAGNOSIS — S39.012A LUMBAR STRAIN, INITIAL ENCOUNTER: ICD-10-CM

## 2024-01-29 PROCEDURE — 63600175 PHARM REV CODE 636 W HCPCS: Performed by: EMERGENCY MEDICINE

## 2024-01-29 PROCEDURE — 96372 THER/PROPH/DIAG INJ SC/IM: CPT | Performed by: EMERGENCY MEDICINE

## 2024-01-29 PROCEDURE — 29125 APPL SHORT ARM SPLINT STATIC: CPT | Mod: RT

## 2024-01-29 PROCEDURE — 99284 EMERGENCY DEPT VISIT MOD MDM: CPT | Mod: 25

## 2024-01-29 RX ORDER — HYDROCODONE BITARTRATE AND ACETAMINOPHEN 5; 325 MG/1; MG/1
1 TABLET ORAL EVERY 6 HOURS PRN
Qty: 12 TABLET | Refills: 0 | Status: SHIPPED | OUTPATIENT
Start: 2024-01-29 | End: 2024-02-08

## 2024-01-29 RX ORDER — KETOROLAC TROMETHAMINE 30 MG/ML
30 INJECTION, SOLUTION INTRAMUSCULAR; INTRAVENOUS
Status: COMPLETED | OUTPATIENT
Start: 2024-01-29 | End: 2024-01-29

## 2024-01-29 RX ADMIN — KETOROLAC TROMETHAMINE 30 MG: 30 INJECTION, SOLUTION INTRAMUSCULAR; INTRAVENOUS at 12:01

## 2024-01-29 NOTE — ED PROVIDER NOTES
Encounter Date: 2024       History     Chief Complaint   Patient presents with    Wrist Injury     Right wrist pain after fall yesterday     Chief complaint: Fall    HPI:  53-year-old female sustained a fall with outstretched hands behind her yesterday.  She reports predominantly left wrist pain and swelling and lumbar midline pain.  She has mild left wrist and forearm pain.  She denies any head, neck, chest or abdominal pain.  Past medical history is significant for peptic ulcer disease and anxiety.      Review of patient's allergies indicates:   Allergen Reactions    Lexapro [escitalopram] Nausea Only     Dizziness, humming in ears, shakes     Past Medical History:   Diagnosis Date    PUD (peptic ulcer disease)     teenager     Past Surgical History:   Procedure Laterality Date     SECTION      hysterctomy      KNEE SURGERY      *3     Family History   Problem Relation Age of Onset    Breast cancer Mother      Social History     Tobacco Use    Smoking status: Never    Smokeless tobacco: Never   Substance Use Topics    Alcohol use: Yes     Comment: OCC    Drug use: Not Currently     Review of Systems    Physical Exam     Initial Vitals [24 1120]   BP Pulse Resp Temp SpO2   132/75 64 17 97.6 °F (36.4 °C) 98 %      MAP       --         Physical Exam    ED Course   Procedures  Labs Reviewed - No data to display       Imaging Results              X-Ray Wrist Complete Right (Final result)  Result time 24 13:49:07      Final result by Ke Nelson MD (24 13:49:07)                   Impression:      Possible nondisplaced dorsal triquetral fracture.      Electronically signed by: Ke Nelson MD  Date:    2024  Time:    13:49               Narrative:    EXAMINATION:  XR WRIST COMPLETE 3 VIEWS RIGHT    CLINICAL HISTORY:  Unspecified fall, initial encounter    TECHNIQUE:  PA, lateral, and oblique views of the right wrist were  performed.    COMPARISON:  None    FINDINGS:  Dorsal soft tissue swelling is present.  Lateral view demonstrates a possible nondisplaced fracture of the dorsum of the triquetrum.                                       X-Ray Lumbar Spine Ap And Lateral (Final result)  Result time 01/29/24 12:32:34      Final result by Skinny Vang MD (01/29/24 12:32:34)                   Impression:      1. Mild degenerative changes without evidence of an acute process.      Electronically signed by: Skinny Vang  Date:    01/29/2024  Time:    12:32               Narrative:    EXAMINATION:  XR LUMBAR SPINE AP AND LATERAL    CLINICAL HISTORY:  fall;    TECHNIQUE:  AP, lateral and spot images were performed of the lumbar spine.    COMPARISON:  None    FINDINGS:  Vertebral body heights are preserved.  No bony destructive changes.Mild levocurvature.  Sagittal alignment is within normal limits.Mild lower lumbar predominant facet arthrosis and mild degenerative disc height loss at L3-L4 and L4-L5.                                       Medications   ketorolac injection 30 mg (30 mg Intramuscular Given 1/29/24 1244)     Medical Decision Making  53-year-old female presents with bilateral wrist pain (right greater than left) and back pain after a fall.  X-rays suggest a possible right triquetrum fracture.  She is placed in a thumb spica splint.  Lumbar x-rays independently interpreted by me failed to demonstrate any fracture or subluxation.    Amount and/or Complexity of Data Reviewed  Radiology: ordered.    Risk  Prescription drug management.                                      Clinical Impression:  Final diagnoses:  [W19.XXXA] Fall  [S62.111A] Triquetral chip fracture, right, closed, initial encounter (Primary)  [S39.012A] Lumbar strain, initial encounter                 Jeremías Figueroa III, MD  01/29/24 9522

## 2024-02-06 DIAGNOSIS — Z12.11 COLON CANCER SCREENING: ICD-10-CM

## 2024-02-08 RX ORDER — ROSUVASTATIN CALCIUM 20 MG/1
20 TABLET, COATED ORAL
Qty: 90 TABLET | Refills: 0 | Status: SHIPPED | OUTPATIENT
Start: 2024-02-08 | End: 2024-04-01 | Stop reason: SDUPTHER

## 2024-02-08 NOTE — TELEPHONE ENCOUNTER
No care due was identified.  [facility] Embedded Care Due Messages. Reference number: [id number].   2/08/2024 5:41:52 AM CST

## 2024-02-19 ENCOUNTER — TELEPHONE (OUTPATIENT)
Dept: PSYCHIATRY | Facility: CLINIC | Age: 54
End: 2024-02-19
Payer: MEDICAID

## 2024-02-19 NOTE — TELEPHONE ENCOUNTER
Tried to contact patient to offer follow up appt since she missed her last appt. Pt did not answer call so I LVM for return call. Will also send [employer] [place] message.

## 2024-02-19 NOTE — TELEPHONE ENCOUNTER
Patient returned my call. Accepted appointment offered. No further questions or concerns at this time.

## 2024-02-20 ENCOUNTER — OFFICE VISIT (OUTPATIENT)
Dept: PSYCHIATRY | Facility: CLINIC | Age: 54
End: 2024-02-20
Payer: MEDICAID

## 2024-02-20 VITALS
WEIGHT: 170.88 LBS | HEART RATE: 75 BPM | DIASTOLIC BLOOD PRESSURE: 84 MMHG | BODY MASS INDEX: 33.55 KG/M2 | SYSTOLIC BLOOD PRESSURE: 115 MMHG | HEIGHT: 60 IN

## 2024-02-20 DIAGNOSIS — G47.00 INSOMNIA, UNSPECIFIED TYPE: ICD-10-CM

## 2024-02-20 DIAGNOSIS — F41.1 GENERALIZED ANXIETY DISORDER: ICD-10-CM

## 2024-02-20 DIAGNOSIS — F33.2 SEVERE EPISODE OF RECURRENT MAJOR DEPRESSIVE DISORDER, WITHOUT PSYCHOTIC FEATURES: Primary | ICD-10-CM

## 2024-02-20 PROCEDURE — 99214 OFFICE O/P EST MOD 30 MIN: CPT | Mod: S$PBB,AF,HB, | Performed by: STUDENT IN AN ORGANIZED HEALTH CARE EDUCATION/TRAINING PROGRAM

## 2024-02-20 PROCEDURE — 99999 PR PBB SHADOW E&M-EST. PATIENT-LVL III: CPT | Mod: PBBFAC,AF,HB, | Performed by: STUDENT IN AN ORGANIZED HEALTH CARE EDUCATION/TRAINING PROGRAM

## 2024-02-20 PROCEDURE — 3008F BODY MASS INDEX DOCD: CPT | Mod: AF,HB,CPTII, | Performed by: STUDENT IN AN ORGANIZED HEALTH CARE EDUCATION/TRAINING PROGRAM

## 2024-02-20 PROCEDURE — 3074F SYST BP LT 130 MM HG: CPT | Mod: AF,HB,CPTII, | Performed by: STUDENT IN AN ORGANIZED HEALTH CARE EDUCATION/TRAINING PROGRAM

## 2024-02-20 PROCEDURE — 96136 PSYCL/NRPSYC TST PHY/QHP 1ST: CPT | Mod: 59,AF,HB, | Performed by: STUDENT IN AN ORGANIZED HEALTH CARE EDUCATION/TRAINING PROGRAM

## 2024-02-20 PROCEDURE — 3079F DIAST BP 80-89 MM HG: CPT | Mod: AF,HB,CPTII, | Performed by: STUDENT IN AN ORGANIZED HEALTH CARE EDUCATION/TRAINING PROGRAM

## 2024-02-20 PROCEDURE — 1160F RVW MEDS BY RX/DR IN RCRD: CPT | Mod: AF,HB,CPTII, | Performed by: STUDENT IN AN ORGANIZED HEALTH CARE EDUCATION/TRAINING PROGRAM

## 2024-02-20 PROCEDURE — 99213 OFFICE O/P EST LOW 20 MIN: CPT | Mod: PBBFAC,PN | Performed by: STUDENT IN AN ORGANIZED HEALTH CARE EDUCATION/TRAINING PROGRAM

## 2024-02-20 PROCEDURE — 1159F MED LIST DOCD IN RCRD: CPT | Mod: AF,HB,CPTII, | Performed by: STUDENT IN AN ORGANIZED HEALTH CARE EDUCATION/TRAINING PROGRAM

## 2024-02-20 RX ORDER — FLUOXETINE HYDROCHLORIDE 20 MG/1
CAPSULE ORAL
Qty: 30 CAPSULE | Refills: 1 | Status: SHIPPED | OUTPATIENT
Start: 2024-02-20 | End: 2024-03-21 | Stop reason: SDUPTHER

## 2024-02-20 RX ORDER — TRAZODONE HYDROCHLORIDE 100 MG/1
TABLET ORAL
Qty: 30 TABLET | Refills: 1 | Status: SHIPPED | OUTPATIENT
Start: 2024-02-20 | End: 2024-03-21 | Stop reason: SDUPTHER

## 2024-02-20 RX ORDER — LURASIDONE HYDROCHLORIDE 40 MG/1
40 TABLET, FILM COATED ORAL DAILY
Qty: 30 TABLET | Refills: 1 | Status: SHIPPED | OUTPATIENT
Start: 2024-02-20 | End: 2024-03-21 | Stop reason: SDUPTHER

## 2024-02-20 RX ORDER — TRAZODONE HYDROCHLORIDE 150 MG/1
TABLET ORAL
Qty: 30 TABLET | Refills: 1 | Status: SHIPPED | OUTPATIENT
Start: 2024-02-20 | End: 2024-03-21 | Stop reason: SDUPTHER

## 2024-02-20 RX ORDER — FLUOXETINE HYDROCHLORIDE 40 MG/1
CAPSULE ORAL
Qty: 30 CAPSULE | Refills: 1 | Status: SHIPPED | OUTPATIENT
Start: 2024-02-20 | End: 2024-03-21 | Stop reason: SDUPTHER

## 2024-02-20 NOTE — PATIENT INSTRUCTIONS
Referral to [facility]  Increase LATUDA to 40mg daily with 350+ Calories  Continue TRAZODONE 250mg at bedtime  Continue other medications as prescribed   Keep therapy appointments

## 2024-02-20 NOTE — PROGRESS NOTES
"    Outpatient Psychiatry Followup Visit (DO/MD/NP, etc.)    2/20/2024  Assessment & Plan    Assessment - Plan:     Impression     ICD-10-CM ICD-9-CM   1. Severe episode of recurrent major depressive disorder, without psychotic features  F33.2 296.33   2. Generalized anxiety disorder  F41.1 300.02   3. Insomnia, unspecified type  G47.00 780.52   4. BMI 33.0-33.9,adult  Z68.33 V85.33        Plan of Care & Medication Management    Chart was reviewed. The risks and benefits of medication were discussed with pt. The treatment plan and followup plan were reviewed with pt. Pt understands to contact clinic if symptoms worsen. Pt understands to call 911 or go to nearest ER for suicidal ideation, intent or plan.   RX History ABILIFY (affected driving), CYMBALTA, BUSPAR (NO response), EFFEXOR (didn't work), LEXAPRO (nausea, dizzy, tinnitus), PROZAC (dry mouth), TRAZODONE, TRINTELLIX (didn't work), VIIBRYD (didn't work) and WELLBUTRIN (NO response at 300mg)     Current RX 10/24/2023: DDAS 84517; RX "UNCOMMON OR MULTIPLE ANTIDEPRESSANTS", RE 23%  PG on chart  28NOV2023: Decided on starting augmentation with BUSPAR, reassess in 2 weeks. Plans B and C: Depending on response, will consider ABILIFY as alternative. If both fail, considering switching PROZAC to PAMELOR  Increase JACK  Pt was provided [employer] educational material 1/11/2024.  1/11/2024, 12/12/2023: Discussed standard serotonin syndrome contingency plan: Stop taking and reach out to the clinic for worsening of insomnia, confusion, agitation or restlessness, tremors, rising blood pressure, sweating or rapid heart rate.  Metabolic monitoring:  AUG2023 lipids acceptable  60DLN0530 BMI 32.8  A1C needed  Adjustments:  85GOT8510: Increase to 40mg daily with 350+ Cals  11JAN2024: Start 20mg daily with 350+ Cals  Prior to JAN2024 pt attempted a trial of ABILIFY  Continue PROZAC  Adjustments:  66BOF2483: Increase to 60mg daily  15TBW9351: Increase to 40mg daily  2CHG6620: Start " 20mg daily (after 1 week washout from VIIBRYD)  Prior to 14ECA1965 pt had been taking  TRAZODONE 250mg HS and VIIBRYD 40mg daily  Reduce TRAZODONE  Monitor for daytime sedation  Adjustments:  44WCV0512: Adjust to 250mg HS (how pt reports taking)  86XOQ1233: Adjust to 250mg HS, 50mg daily  30HSY9474: Increase to 300mg HS  84MLX0587: Increase to 250mg HS  69HCC8537: Increase to 200mg HS  54MUV1597: Increase to 150mg HS  88OAX7398: Increase to 100mg HS  77EAB8911: Increase to 50mg HS (scheduled)  Prior to evaluation pt had been taking 50mg HS PRN INSOMNIA   Education & Counseling RX administration and adherence   Other Orders Referral to IOP  Continue individual psychotherapy   Monitor VITAL SIGNS  Instruments: PROMIS (A&D), PSS4   RETURN K: RETURN IN 4 WEEKS (ONE MONTH), and reassess frequency within three visits from now  Continue medication management     Subjective    Interval History:     Available documentation has been reviewed, and pertinent elements of the chart have been incorporated into this note where appropriate. Last [facility] encounter with writer was on 1/11/2024   Mirtha Stewart, a 53 y.o. female, presenting for followup visit.      Since last visit, reports overall A LITTLE WORSE.    Broke wrist from a fall about 3wa. Feeling more depressed.    Exercising, but now limited by wrist brace.     Wants to work. Struggling to maintain hope.    Keeping therapy appts.    Reports NO difference with LATUDA. NO side effects. Taking with food, as prescribed. Discussed increasing to 40mg.    Could NOT tolerate TRAZODONE am dose (50mg), so stopped taking it and only taking 250mg HS. Would like to continue HS dosing only.    Discussed adding IOP to treatment; will refer to [facility]       Unless otherwise specified, pt did NOT display signs of nor endorse symptoms of overt psychosis or acute mood disorder requiring hospitalization during the encounter; pt denied violent thoughts or suicidal or homicidal ideation,  intent, or plan.         Objective    Measurement-Based Care (MBC):     Routine Instruments   PROMIS-ANXIETY Interpretation: 19 (4a raw score): T-SCORE 77.9; SEVERE using 55-60-70 cutoffs. Last T-SCORE was 77.9. This PROMIS T-score change of 5 points or fewer indicates the score is probably stable.   PROMIS-DEPRESSION Interpretation: 19 (4a raw score): T-SCORE 75.7; SEVERE using 55-60-70 cutoffs. Last T-SCORE was 73.3. This PROMIS T-score change of 5 points or fewer indicates the score is probably stable.   PSS4 Interpretation: 14/16; HIGH using 6-11 cutoffs. 2 PH, 1 LSE. High perceived helplessness; pt strongly experiences a lack of control over responses to stress. Moderate lack of self-efficacy; pt moderately perceives an inability to handle problems. PSS4 score has NOT significantly changed since last measurement.   Additional Instruments   N/A     Current Evaluation of Mental Status:     Constitutional / General       Vitals:    02/20/24 1404   BP: 115/84   Pulse: 75   Weight: 77.5 kg (170 lb 13.7 oz)   Height: 5' (1.524 m)       Psychiatric / Mental Status Examination  1. Appearance: Dress is informal but appropriate. Motor activity normal.  2. Discourse: Clear speech with normal rate and volume. Associations intact. Orderly.  3. Emotional Expression: Anxious and depressed mood. Affect is appropriate.  4. Perception and Thinking: No hallucinations. No suicidality, no homicidality, delusions, or paranoia.  5. Sensorium: Grossly intact. Able to focus for interview.  6. Memory and Fund of Knowledge: Intact for content of interview.  7. Insight and Judgment: Intact.               Billing Documentation:     Method of Encounter IN PERSON visit at the clinic   Type of Encounter Follow up visit with me   Counseling;  Psychotherapy    Counseling;  Tobacco and/or Nicotine    Additional Codes and Modifiers 78830, with modifer 59: administered and scored more than one psychological or neuropsychological tests (see MBC  "above) (16+ mins)   Time Remaining Chart/Pt 13760: FOLLOW UP VISIT, Rx mgmt, "Progression/exacerbation of chronic illness(es)"   Total Mins  (2/20/2024) N/A - Not billing for time        Juno Valdovinos DO  Department of Psychiatry, [facility]        "

## 2024-02-23 ENCOUNTER — OFFICE VISIT (OUTPATIENT)
Dept: PSYCHIATRY | Facility: CLINIC | Age: 54
End: 2024-02-23
Payer: MEDICAID

## 2024-02-23 DIAGNOSIS — F41.1 GENERALIZED ANXIETY DISORDER: ICD-10-CM

## 2024-02-23 DIAGNOSIS — F33.2 SEVERE EPISODE OF RECURRENT MAJOR DEPRESSIVE DISORDER, WITHOUT PSYCHOTIC FEATURES: Primary | ICD-10-CM

## 2024-02-23 PROCEDURE — 90834 PSYTX W PT 45 MINUTES: CPT | Mod: AJ,HB,, | Performed by: SOCIAL WORKER

## 2024-02-23 PROCEDURE — 1159F MED LIST DOCD IN RCRD: CPT | Mod: AJ,HB,CPTII, | Performed by: SOCIAL WORKER

## 2024-02-23 PROCEDURE — 99999 PR PBB SHADOW E&M-EST. PATIENT-LVL I: CPT | Mod: PBBFAC,AJ,HB, | Performed by: SOCIAL WORKER

## 2024-02-23 PROCEDURE — 99211 OFF/OP EST MAY X REQ PHY/QHP: CPT | Mod: PBBFAC,PN | Performed by: SOCIAL WORKER

## 2024-02-23 NOTE — PROGRESS NOTES
Individual Psychotherapy (PhD/LCSW)    2/23/2024    Site:  [facility]         Therapeutic Intervention: Met with patient.  Outpatient - Insight oriented psychotherapy 45 min - CPT code 08319, Outpatient - Behavior modifying psychotherapy 45 min - CPT code 49059, and Outpatient - Supportive psychotherapy 45 min - CPT Code 33979    Chief complaint/reason for encounter: depression and anxiety             Interval history and content of current session: Ct was referred to tx by Dr. Valdovinos to address depression and anxiety. Ct arrived to session and was fully engaged.  Ct apologized for being late, she reported that she overslept. Ct shared that she had anxiety related to her final evaluation on her physical condition. She does not have the results yet. She reported that she does not want to be on disability, she wants to work and reported that she is having a hard time finding employment. She reported that she's been completing applications. SW encouraged ct to check out LRS/LWC. Ct reported that she would check them out for job assistance. She shared about being worried about her mother and having to help raise her great niece because her sister and nephew are not able to care for the child. SW encouraged ct do something for self care- go to the gym and sit in the sauna or steam room, take a walk around her neighborhood- add more structure to her days. Ct was receptive. Ct to continue in 1:1 sessions.       Treatment plan:  Target symptoms: depression, anxiety   Why chosen therapy is appropriate versus another modality: relevant to diagnosis, patient responds to this modality, evidence based practice  Outcome monitoring methods: self-report  Therapeutic intervention type: insight oriented psychotherapy, behavior modifying psychotherapy, supportive psychotherapy    Risk parameters:  Patient reports no suicidal ideation  Patient reports no homicidal ideation  Patient reports no self-injurious behavior  Patient reports no  violent behavior    CSSRS was completed:     Mental Status Exam:  General Appearance:  unremarkable, age appropriate   Speech: normal tone, normal rate, normal pitch, normal volume      Level of Cooperation: cooperative      Thought Processes: normal and logical   Mood: depressed      Thought Content: normal, no suicidality, no homicidality, delusions, or paranoia   Affect: congruent and appropriate   Orientation: Oriented x3   Memory: recent >  intact   Attention Span & Concentration: intact   Fund of General Knowledge: intact and appropriate to age and level of education   Abstract Reasoning: interpretation of similarities was abstract   Judgment & Insight: fair, intact     Language intact       Verbal deficits: None    Patient's response to intervention:  The patient's response to intervention is accepting.    Progress toward goals and other mental status changes:  The patient's progress toward goals is fair .    Diagnosis:     ICD-10-CM ICD-9-CM   1. Severe episode of recurrent major depressive disorder, without psychotic features  F33.2 296.33   2. Generalized anxiety disorder  F41.1 300.02       Plan:  individual psychotherapy and Ct to follow up with Dr. Valdovinos for psych med mgt  Pt to go to ED or call 911 if symptoms worsen or if she has thoughts of harming self and/or others. Pt verbalized understanding.    Return to clinic: as scheduled    Length of Service (minutes): 45      A portion of this note was created using [employer] voice recognition software that occasionally misinterprets phrases or words.    Each patient to whom he or she provides medical services by telemedicine is: (1) informed of the relationship between the physician and patient and the respective role of any other health care provider with respect to management of the patient; and (2) notified that he or she may decline to receive medical services by telemedicine and may withdraw from such care at any time.

## 2024-03-15 ENCOUNTER — TELEPHONE (OUTPATIENT)
Dept: FAMILY MEDICINE | Facility: CLINIC | Age: 54
End: 2024-03-15
Payer: MEDICAID

## 2024-03-15 DIAGNOSIS — M25.531 RIGHT WRIST PAIN: Primary | ICD-10-CM

## 2024-03-21 ENCOUNTER — OFFICE VISIT (OUTPATIENT)
Dept: PSYCHIATRY | Facility: CLINIC | Age: 54
End: 2024-03-21
Payer: MEDICAID

## 2024-03-21 VITALS
HEIGHT: 60 IN | BODY MASS INDEX: 33.46 KG/M2 | WEIGHT: 170.44 LBS | SYSTOLIC BLOOD PRESSURE: 115 MMHG | DIASTOLIC BLOOD PRESSURE: 78 MMHG | HEART RATE: 71 BPM

## 2024-03-21 DIAGNOSIS — F41.1 GENERALIZED ANXIETY DISORDER: ICD-10-CM

## 2024-03-21 DIAGNOSIS — F33.2 SEVERE EPISODE OF RECURRENT MAJOR DEPRESSIVE DISORDER, WITHOUT PSYCHOTIC FEATURES: Primary | ICD-10-CM

## 2024-03-21 DIAGNOSIS — G47.00 INSOMNIA, UNSPECIFIED TYPE: ICD-10-CM

## 2024-03-21 DIAGNOSIS — Z59.86 FINANCIAL INSECURITY: ICD-10-CM

## 2024-03-21 DIAGNOSIS — E55.9 VITAMIN D INSUFFICIENCY: ICD-10-CM

## 2024-03-21 PROCEDURE — 99213 OFFICE O/P EST LOW 20 MIN: CPT | Mod: PBBFAC,PN | Performed by: STUDENT IN AN ORGANIZED HEALTH CARE EDUCATION/TRAINING PROGRAM

## 2024-03-21 PROCEDURE — 1160F RVW MEDS BY RX/DR IN RCRD: CPT | Mod: AF,HB,CPTII, | Performed by: STUDENT IN AN ORGANIZED HEALTH CARE EDUCATION/TRAINING PROGRAM

## 2024-03-21 PROCEDURE — 1159F MED LIST DOCD IN RCRD: CPT | Mod: AF,HB,CPTII, | Performed by: STUDENT IN AN ORGANIZED HEALTH CARE EDUCATION/TRAINING PROGRAM

## 2024-03-21 PROCEDURE — 99214 OFFICE O/P EST MOD 30 MIN: CPT | Mod: S$PBB,AF,HB, | Performed by: STUDENT IN AN ORGANIZED HEALTH CARE EDUCATION/TRAINING PROGRAM

## 2024-03-21 PROCEDURE — 99211 OFF/OP EST MAY X REQ PHY/QHP: CPT | Mod: PBBFAC,27,PN | Performed by: SOCIAL WORKER

## 2024-03-21 PROCEDURE — 3008F BODY MASS INDEX DOCD: CPT | Mod: AF,HB,CPTII, | Performed by: STUDENT IN AN ORGANIZED HEALTH CARE EDUCATION/TRAINING PROGRAM

## 2024-03-21 PROCEDURE — 99999 PR PBB SHADOW E&M-EST. PATIENT-LVL III: CPT | Mod: PBBFAC,AF,HB, | Performed by: STUDENT IN AN ORGANIZED HEALTH CARE EDUCATION/TRAINING PROGRAM

## 2024-03-21 PROCEDURE — 3078F DIAST BP <80 MM HG: CPT | Mod: AF,HB,CPTII, | Performed by: STUDENT IN AN ORGANIZED HEALTH CARE EDUCATION/TRAINING PROGRAM

## 2024-03-21 PROCEDURE — 1159F MED LIST DOCD IN RCRD: CPT | Mod: AJ,HB,CPTII, | Performed by: SOCIAL WORKER

## 2024-03-21 PROCEDURE — 90837 PSYTX W PT 60 MINUTES: CPT | Mod: AJ,HB,, | Performed by: SOCIAL WORKER

## 2024-03-21 PROCEDURE — 96136 PSYCL/NRPSYC TST PHY/QHP 1ST: CPT | Mod: 59,AF,HB, | Performed by: STUDENT IN AN ORGANIZED HEALTH CARE EDUCATION/TRAINING PROGRAM

## 2024-03-21 PROCEDURE — 3074F SYST BP LT 130 MM HG: CPT | Mod: AF,HB,CPTII, | Performed by: STUDENT IN AN ORGANIZED HEALTH CARE EDUCATION/TRAINING PROGRAM

## 2024-03-21 PROCEDURE — 99999 PR PBB SHADOW E&M-EST. PATIENT-LVL I: CPT | Mod: PBBFAC,AJ,HB, | Performed by: SOCIAL WORKER

## 2024-03-21 RX ORDER — TRAZODONE HYDROCHLORIDE 150 MG/1
TABLET ORAL
Qty: 30 TABLET | Refills: 1 | Status: SHIPPED | OUTPATIENT
Start: 2024-03-21 | End: 2024-05-21 | Stop reason: SDUPTHER

## 2024-03-21 RX ORDER — TRAZODONE HYDROCHLORIDE 100 MG/1
TABLET ORAL
Qty: 30 TABLET | Refills: 1 | Status: SHIPPED | OUTPATIENT
Start: 2024-03-21 | End: 2024-05-21 | Stop reason: SDUPTHER

## 2024-03-21 RX ORDER — FLUOXETINE HYDROCHLORIDE 20 MG/1
CAPSULE ORAL
Qty: 30 CAPSULE | Refills: 1 | Status: SHIPPED | OUTPATIENT
Start: 2024-03-21 | End: 2024-05-21 | Stop reason: SDUPTHER

## 2024-03-21 RX ORDER — LURASIDONE HYDROCHLORIDE 40 MG/1
40 TABLET, FILM COATED ORAL DAILY
Qty: 30 TABLET | Refills: 1 | Status: SHIPPED | OUTPATIENT
Start: 2024-03-21 | End: 2024-05-21 | Stop reason: SDUPTHER

## 2024-03-21 RX ORDER — FLUOXETINE HYDROCHLORIDE 40 MG/1
CAPSULE ORAL
Qty: 30 CAPSULE | Refills: 1 | Status: SHIPPED | OUTPATIENT
Start: 2024-03-21 | End: 2024-05-21 | Stop reason: SDUPTHER

## 2024-03-21 SDOH — SOCIAL DETERMINANTS OF HEALTH (SDOH): FINANCIAL INSECURITY: Z59.86

## 2024-03-21 NOTE — PATIENT INSTRUCTIONS
Continue medications as prescribed   Start IOP  Please complete lab work at your earliest convenience. Your labs are NON-FASTING.

## 2024-03-21 NOTE — PROGRESS NOTES
"    Outpatient Psychiatry Followup Visit (DO/MD/NP, etc.)    3/21/2024  Assessment & Plan    Assessment - Plan:     Impression     ICD-10-CM ICD-9-CM   1. Severe episode of recurrent major depressive disorder, without psychotic features  F33.2 296.33   2. Generalized anxiety disorder  F41.1 300.02   3. Insomnia, unspecified type  G47.00 780.52   4. Vitamin D insufficiency  E55.9 268.9   5. BMI 33.0-33.9,adult  Z68.33 V85.33        Plan of Care & Medication Management    Chart was reviewed. The risks and benefits of medication were discussed with pt. The treatment plan and followup plan were reviewed with pt. Pt understands to contact clinic if symptoms worsen. Pt understands to call 911 or go to nearest ER for suicidal ideation, intent or plan.   RX History ABILIFY (affected driving), CYMBALTA, BUSPAR (NO response), EFFEXOR (didn't work), LEXAPRO (nausea, dizzy, tinnitus), PROZAC (dry mouth), TRAZODONE, TRINTELLIX (didn't work), VIIBRYD (didn't work) and WELLBUTRIN (NO response at 300mg)      Current RX 10/24/2023: DDAS 29716; RX "UNCOMMON OR MULTIPLE ANTIDEPRESSANTS", RE 23%  PG on chart  28NOV2023: Decided on starting augmentation with BUSPAR, reassess in 2 weeks. Plans B and C: Depending on response, will consider ABILIFY as alternative. If both fail, considering switching PROZAC to PAMELOR  Continue LATUDA  Pt was provided NEI educational material 1/11/2024.  1/11/2024, 12/12/2023: Discussed standard serotonin syndrome contingency plan: Stop taking and reach out to the clinic for worsening of insomnia, confusion, agitation or restlessness, tremors, rising blood pressure, sweating or rapid heart rate.  Metabolic monitoring:  AUG2023 lipids acceptable  [id number] BMI 32.8  A1C needed  Adjustments:  50FIT5953: Increase to 40mg daily with 350+ Cals  11JAN2024: Start 20mg daily with 350+ Cals  Prior to JAN2024 pt attempted a trial of ABILIFY  Continue PROZAC  Adjustments:  87TQE8811: Increase to 60mg daily  28OKJ5246: " "Increase to 40mg daily  4IOU3380: Start 20mg daily (after 1 week washout from VIIBRYD)  Prior to 77FYV2919 pt had been taking  TRAZODONE 250mg HS and VIIBRYD 40mg daily  Continue TRAZODONE  Monitor for daytime sedation  Adjustments:  90NKI7824: Adjust to 250mg HS (how pt reports taking)  49YEO3019: Adjust to 250mg HS, 50mg daily  03EXQ9580: Increase to 300mg HS  87XCD4530: Increase to 250mg HS  57VEK3728: Increase to 200mg HS  14DHX8379: Increase to 150mg HS  45IND0577: Increase to 100mg HS  89QQL2527: Increase to 50mg HS (scheduled)  Prior to evaluation pt had been taking 50mg HS PRN INSOMNIA   Education & Counseling RX administration and adherence   Other Orders VITAMIN D calcitriol (Relevant risk factor(s) for abnormal value: a previous history of deficiency or insufficiency, depression, overweight BMI, sleep changes, and using a medication that interferes with absorption or stability (Statin))  Continue individual psychotherapy  Start IOP   Monitor VITAL SIGNS  Instruments: PROMIS (A&D), PSS4   RETURN R: RETURN IN 8 WEEKS (TWO MONTHS), and reassess frequency within three visits from now  Continue medication management     Subjective    Interval History:     Available documentation has been reviewed, and pertinent elements of the chart have been incorporated into this note where appropriate. Last [facility] encounter with writer was on 2/20/2024   Mirtha Stewart, a 54 y.o. female, presenting for followup visit.      Since last visit, reports overall A LITTLE WORSE.    Knee pain. Problems with workman's comp. Financial strain. Losing hope. Wants to work. Plans to apply for food stamps.    As for medications, "I don't feel any different."    Discussed starting IOP. Will continue medications as prescribed for now.       Unless otherwise specified, pt did NOT display signs of nor endorse symptoms of overt psychosis or acute mood disorder requiring hospitalization during the encounter; pt denied violent thoughts or " "suicidal or homicidal ideation, intent, or plan.         Objective    Measurement-Based Care (MBC):     Routine Instruments   PROMIS-ANXIETY Interpretation: 20 (4a raw score): T-SCORE 81.6; SEVERE using 55-60-70 cutoffs. Last T-SCORE was 77.9. This PROMIS T-score change of 5 points or fewer indicates the score is probably stable.   PROMIS-DEPRESSION Interpretation: 20 (4a raw score): T-SCORE 79.4; SEVERE using 55-60-70 cutoffs. Last T-SCORE was 75.7. This PROMIS T-score change of 5 points or fewer indicates the score is probably stable.   PSS4 Interpretation: 14/16; HIGH using 6-11 cutoffs. PSS4 score has NOT significantly changed since last measurement.   Additional Instruments   N/A     Current Evaluation of Mental Status:     Constitutional / General       Vitals:    03/21/24 1501   BP: 115/78   Pulse: 71   Weight: 77.3 kg (170 lb 6.7 oz)   Height: 5' (1.524 m)       Psychiatric / Mental Status Examination  1. Appearance: Dress is informal but appropriate. Motor activity normal.  2. Discourse: Clear speech with normal rate and volume. Associations intact. Orderly.  3. Emotional Expression: Depressed mood. Affect is appropriate.  4. Perception and Thinking: No hallucinations. No suicidality, no homicidality, delusions, or paranoia.  5. Sensorium: Grossly intact. Able to focus for interview.  6. Memory and Fund of Knowledge: Intact for content of interview.  7. Insight and Judgment: Intact.               Billing Documentation:     Method of Encounter IN PERSON visit at the clinic   Type of Encounter Follow up visit with me   Counseling;  Psychotherapy    Counseling;  Tobacco and/or Nicotine    Additional Codes and Modifiers 91500, with modifer 59: administered and scored more than one psychological or neuropsychological tests (see MBC above) (16+ mins)   Time Remaining Chart/Pt 02218: FOLLOW UP VISIT, Rx mgmt, "UNSTABLE chronic illness(es) which require(s) a change in treatment"   Total Mins  (3/21/2024) N/A - Not " billing for time        Jnuo Valdovinos, DO  Department of Psychiatry, [facility]

## 2024-03-22 NOTE — PROGRESS NOTES
Individual Psychotherapy (PhD/LCSW)    3/21/2024    Site:  [facility]         Therapeutic Intervention: Met with patient.  Outpatient - Insight oriented psychotherapy 60 min - CPT code 86775, Outpatient - Behavior modifying psychotherapy 60 min - CPT code 44647, and Outpatient - Supportive psychotherapy 60 min - CPT Code 80704    Chief complaint/reason for encounter: depression, anxiety, and financial stress             Interval history and content of current session: Ct was referred to tx by Dr. Valdovinos to address depression and anxiety. Ct arrived to session and was fully engaged.  Ct shared that she was no longer getting her disability payments. Ct shared that the payments stopped with no warning. Ct had concerns about how she was going to pay her bills and make ends meet. Sw and ct processed the importance of ct using her family for support as they have offered to help her. SW and ct processed ct continuing to look for work that she could do within her limitations even if it's part time until she found something more permanent. Ct was reluctant but verbalized understanding. SW provided ct with info on [employer] to help ct with utility payments. Ct shared that she was going to sign up for her SNAp benefits. Ct will see Dr. Valdovinos after this session. Ct to continue in 1:1 sessions.       Treatment plan:  Target symptoms: depression, anxiety , financial stress  Why chosen therapy is appropriate versus another modality: relevant to diagnosis, patient responds to this modality, evidence based practice  Outcome monitoring methods: self-report  Therapeutic intervention type: insight oriented psychotherapy, behavior modifying psychotherapy, supportive psychotherapy    Risk parameters:  Patient reports no suicidal ideation  Patient reports no homicidal ideation  Patient reports no self-injurious behavior  Patient reports no violent behavior    CSSRS was completed:     Mental Status Exam:  General Appearance:   unremarkable, age appropriate   Speech: normal tone, normal rate, normal pitch, normal volume      Level of Cooperation: cooperative      Thought Processes: normal and logical   Mood: anxious      Thought Content: normal, no suicidality, no homicidality, delusions, or paranoia   Affect: appropriate   Orientation: Oriented x3   Memory: recent >  intact   Attention Span & Concentration: intact   Fund of General Knowledge: intact and appropriate to age and level of education   Abstract Reasoning: interpretation of similarities was abstract   Judgment & Insight: fair, intact     Language intact       Verbal deficits: None    Patient's response to intervention:  The patient's response to intervention is accepting, reluctant.    Progress toward goals and other mental status changes:  The patient's progress toward goals is fair .    Diagnosis:     ICD-10-CM ICD-9-CM   1. Severe episode of recurrent major depressive disorder, without psychotic features  F33.2 296.33   2. Generalized anxiety disorder  F41.1 300.02   3. Financial insecurity  Z59.86 V60.2       Plan:  individual psychotherapy and ct to follow up with Dr. Valdovinos for psych med mgt  Pt to go to ED or call 911 if symptoms worsen or if she has thoughts of harming self and/or others. Pt verbalized understanding.    Return to clinic: as scheduled    Length of Service (minutes): 60      A portion of this note was created using [employer] voice recognition software that occasionally misinterprets phrases or words.    Each patient to whom he or she provides medical services by telemedicine is: (1) informed of the relationship between the physician and patient and the respective role of any other health care provider with respect to management of the patient; and (2) notified that he or she may decline to receive medical services by telemedicine and may withdraw from such care at any time.

## 2024-03-25 ENCOUNTER — PATIENT MESSAGE (OUTPATIENT)
Dept: FAMILY MEDICINE | Facility: CLINIC | Age: 54
End: 2024-03-25
Payer: MEDICAID

## 2024-04-01 ENCOUNTER — OFFICE VISIT (OUTPATIENT)
Dept: FAMILY MEDICINE | Facility: CLINIC | Age: 54
End: 2024-04-01
Payer: MEDICAID

## 2024-04-01 VITALS
BODY MASS INDEX: 33.01 KG/M2 | SYSTOLIC BLOOD PRESSURE: 112 MMHG | TEMPERATURE: 99 F | WEIGHT: 169 LBS | HEART RATE: 80 BPM | OXYGEN SATURATION: 99 % | DIASTOLIC BLOOD PRESSURE: 73 MMHG

## 2024-04-01 DIAGNOSIS — N95.1 MENOPAUSAL SYNDROME: ICD-10-CM

## 2024-04-01 DIAGNOSIS — Z91.89 AT HIGH RISK FOR BREAST CANCER: ICD-10-CM

## 2024-04-01 DIAGNOSIS — F32.A DEPRESSION, UNSPECIFIED DEPRESSION TYPE: ICD-10-CM

## 2024-04-01 DIAGNOSIS — Z12.11 COLON CANCER SCREENING: ICD-10-CM

## 2024-04-01 DIAGNOSIS — I10 HYPERTENSION, ESSENTIAL: Primary | ICD-10-CM

## 2024-04-01 DIAGNOSIS — R23.2 HOT FLASHES: ICD-10-CM

## 2024-04-01 DIAGNOSIS — L65.9 HAIR LOSS: ICD-10-CM

## 2024-04-01 DIAGNOSIS — Z90.710 S/P HYSTERECTOMY: ICD-10-CM

## 2024-04-01 DIAGNOSIS — Z79.1 NSAID LONG-TERM USE: ICD-10-CM

## 2024-04-01 DIAGNOSIS — Z96.651 S/P TKR (TOTAL KNEE REPLACEMENT), RIGHT: ICD-10-CM

## 2024-04-01 PROCEDURE — 1159F MED LIST DOCD IN RCRD: CPT | Mod: CPTII,,, | Performed by: FAMILY MEDICINE

## 2024-04-01 PROCEDURE — 99214 OFFICE O/P EST MOD 30 MIN: CPT | Mod: S$PBB,,, | Performed by: FAMILY MEDICINE

## 2024-04-01 PROCEDURE — 99999 PR PBB SHADOW E&M-EST. PATIENT-LVL IV: CPT | Mod: PBBFAC,,, | Performed by: FAMILY MEDICINE

## 2024-04-01 PROCEDURE — 99214 OFFICE O/P EST MOD 30 MIN: CPT | Mod: PBBFAC,PN | Performed by: FAMILY MEDICINE

## 2024-04-01 PROCEDURE — 3008F BODY MASS INDEX DOCD: CPT | Mod: CPTII,,, | Performed by: FAMILY MEDICINE

## 2024-04-01 PROCEDURE — 3078F DIAST BP <80 MM HG: CPT | Mod: CPTII,,, | Performed by: FAMILY MEDICINE

## 2024-04-01 PROCEDURE — 3074F SYST BP LT 130 MM HG: CPT | Mod: CPTII,,, | Performed by: FAMILY MEDICINE

## 2024-04-01 PROCEDURE — 1160F RVW MEDS BY RX/DR IN RCRD: CPT | Mod: CPTII,,, | Performed by: FAMILY MEDICINE

## 2024-04-01 RX ORDER — ROSUVASTATIN CALCIUM 20 MG/1
20 TABLET, COATED ORAL DAILY
Qty: 90 TABLET | Refills: 1 | Status: SHIPPED | OUTPATIENT
Start: 2024-04-01

## 2024-04-01 RX ORDER — MELOXICAM 15 MG/1
15 TABLET ORAL DAILY
Qty: 90 TABLET | Refills: 1 | Status: SHIPPED | OUTPATIENT
Start: 2024-04-01

## 2024-04-01 RX ORDER — HYDROCHLOROTHIAZIDE 25 MG/1
25 TABLET ORAL DAILY
Qty: 90 TABLET | Refills: 1 | Status: SHIPPED | OUTPATIENT
Start: 2024-04-01

## 2024-04-01 RX ORDER — POTASSIUM CHLORIDE 20 MEQ/1
20 TABLET, EXTENDED RELEASE ORAL DAILY
Qty: 90 TABLET | Refills: 1 | Status: SHIPPED | OUTPATIENT
Start: 2024-04-01

## 2024-04-02 NOTE — PROGRESS NOTES
Subjective:       Patient ID: Mirtha Stewart is a 54 y.o. female.    Chief Complaint: Follow-up    Having trouble finding physician so will see her on Medicaid.  Status post right knee replacement.  Three surgeries total.  Still having pain.  Hypertension controlled.  No chest pain or palpitations.  Has hyperlipidemia.  Doing okay.  Hair loss last couple of weeks.  Status post hysterectomy.  Having hot flashes.  Menopausal syndrome.  Estrogen was 5 and FSH was elevated at 80.  High-risk breast.  25.41%.  Due for follow-up mammogram.  BMI of 33.  Depression using her Prozac.  Doing okay.  Quite a bit of stress.    Physical examination.  Vital signs noted.  Neck without bruit.  Chest clear.  Heart regular rate rhythm.  Extremities without edema positive pedal pulses.  Splint on the right wrist.          Objective:        Assessment:       1. Hypertension, essential    2. NSAID long-term use    3. S/P TKR (total knee replacement), right    4. Hair loss    5. S/P hysterectomy    6. Hot flashes    7. Menopausal syndrome    8. At high risk for breast cancer    9. BMI 33.0-33.9,adult    10. Colon cancer screening        Plan:       Hypertension, essential    NSAID long-term use    S/P TKR (total knee replacement), right    Hair loss    S/P hysterectomy    Hot flashes    Menopausal syndrome    At high risk for breast cancer  -     Mammo Digital Diagnostic Bilat with Sam; Future; Expected date: 04/01/2024  -      Breast Left Limited; Future; Expected date: 04/01/2024    BMI 33.0-33.9,adult    Colon cancer screening  -     Case Request Endoscopy: COLONOSCOPY    Other orders  -     rosuvastatin (CRESTOR) 20 MG tablet; Take 1 tablet (20 mg total) by mouth once daily.  Dispense: 90 tablet; Refill: 1  -     potassium chloride SA (K-DUR,KLOR-CON) 20 MEQ tablet; Take 1 tablet (20 mEq total) by mouth once daily.  Dispense: 90 tablet; Refill: 1  -     meloxicam (MOBIC) 15 MG tablet; Take 1 tablet (15 mg total) by mouth once  daily.  Dispense: 90 tablet; Refill: 1  -     hydroCHLOROthiazide (HYDRODIURIL) 25 MG tablet; Take 1 tablet (25 mg total) by mouth once daily.  Dispense: 90 tablet; Refill: 1    Refilled medications.  Mammogram ordered.  Continue her current antidepressant.  Refer to [facility] for colonoscopy.  Do stool for blood also as ordered.  Continue her Prozac.

## 2024-04-09 ENCOUNTER — TELEPHONE (OUTPATIENT)
Dept: GASTROENTEROLOGY | Facility: CLINIC | Age: 54
End: 2024-04-09
Payer: MEDICAID

## 2024-04-18 ENCOUNTER — TELEPHONE (OUTPATIENT)
Dept: GASTROENTEROLOGY | Facility: CLINIC | Age: 54
End: 2024-04-18
Payer: MEDICAID

## 2024-04-18 NOTE — TELEPHONE ENCOUNTER
Colonoscopy 7/11 at 11am arrive for 10am instructions reviewed and patient states understanding. Copy to portal.   Patient states last colonoscopy normal in 2012 with Dr Sandoval.

## 2024-04-25 ENCOUNTER — OFFICE VISIT (OUTPATIENT)
Dept: FAMILY MEDICINE | Facility: CLINIC | Age: 54
End: 2024-04-25
Payer: MEDICAID

## 2024-04-25 ENCOUNTER — TELEPHONE (OUTPATIENT)
Dept: FAMILY MEDICINE | Facility: CLINIC | Age: 54
End: 2024-04-25
Payer: MEDICAID

## 2024-04-25 VITALS
HEART RATE: 93 BPM | TEMPERATURE: 98 F | RESPIRATION RATE: 18 BRPM | OXYGEN SATURATION: 96 % | SYSTOLIC BLOOD PRESSURE: 112 MMHG | HEIGHT: 60 IN | DIASTOLIC BLOOD PRESSURE: 82 MMHG | BODY MASS INDEX: 33.01 KG/M2 | WEIGHT: 168.13 LBS

## 2024-04-25 DIAGNOSIS — L72.9 INFECTED CYST OF SKIN: Primary | ICD-10-CM

## 2024-04-25 DIAGNOSIS — L08.9 INFECTED CYST OF SKIN: Primary | ICD-10-CM

## 2024-04-25 PROCEDURE — 99214 OFFICE O/P EST MOD 30 MIN: CPT | Mod: PBBFAC,PN | Performed by: FAMILY MEDICINE

## 2024-04-25 PROCEDURE — 3079F DIAST BP 80-89 MM HG: CPT | Mod: CPTII,,, | Performed by: FAMILY MEDICINE

## 2024-04-25 PROCEDURE — 99999 PR PBB SHADOW E&M-EST. PATIENT-LVL IV: CPT | Mod: PBBFAC,,, | Performed by: FAMILY MEDICINE

## 2024-04-25 PROCEDURE — 99213 OFFICE O/P EST LOW 20 MIN: CPT | Mod: S$PBB,,, | Performed by: FAMILY MEDICINE

## 2024-04-25 PROCEDURE — 3074F SYST BP LT 130 MM HG: CPT | Mod: CPTII,,, | Performed by: FAMILY MEDICINE

## 2024-04-25 PROCEDURE — 1159F MED LIST DOCD IN RCRD: CPT | Mod: CPTII,,, | Performed by: FAMILY MEDICINE

## 2024-04-25 PROCEDURE — 1160F RVW MEDS BY RX/DR IN RCRD: CPT | Mod: CPTII,,, | Performed by: FAMILY MEDICINE

## 2024-04-25 PROCEDURE — 3008F BODY MASS INDEX DOCD: CPT | Mod: CPTII,,, | Performed by: FAMILY MEDICINE

## 2024-04-25 RX ORDER — ONDANSETRON 4 MG/1
4 TABLET, FILM COATED ORAL EVERY 6 HOURS PRN
Qty: 20 TABLET | Refills: 0 | Status: SHIPPED | OUTPATIENT
Start: 2024-04-25

## 2024-04-25 RX ORDER — DOXYCYCLINE 100 MG/1
100 CAPSULE ORAL EVERY 12 HOURS
Qty: 20 CAPSULE | Refills: 0 | Status: SHIPPED | OUTPATIENT
Start: 2024-04-25 | End: 2024-05-21

## 2024-04-25 RX ORDER — MUPIROCIN 20 MG/G
OINTMENT TOPICAL 2 TIMES DAILY
Qty: 22 G | Refills: 0 | Status: SHIPPED | OUTPATIENT
Start: 2024-04-25

## 2024-04-25 NOTE — PROGRESS NOTES
Subjective     Patient ID: Mirtha Stewart is a 54 y.o. female.    Chief Complaint: Fever, Cyst (draining), and Muscle Pain    Mirtha Stewart is a 54 y.o. female with Medical History of HTN, HLD, NSAID who presents for fever. Also reports nausea. Has been taking Zofran. Pt denies any respiratory symptoms. Denies any UTI symptoms. She also notes itchy cyst to the center of her mid back. Notes that she has been scratching at it and fluid does come out. Has been using mupirocin to alleviate. Denies any other skin infections. Has mammogram on 5/1/24. Has colonoscopy on 7/11/24.       Review of Systems   Constitutional:  Positive for fever.   Respiratory:  Negative for cough.    Genitourinary:  Negative for dysuria.   Musculoskeletal:         Cyst to mid back   All other systems reviewed and are negative.    Cyst is little reddened.  It is about a cm or so in diameter.  There is little serous drainage from the center.  No fluctuance.  Midback.     Objective     Physical Exam  Vitals reviewed.   Cardiovascular:      Rate and Rhythm: Regular rhythm.   Pulmonary:      Breath sounds: Normal breath sounds.   Musculoskeletal:      Comments: 1 cm erythematous area with serous drainage to the mid thoracic back.            Assessment and Plan     1. Infected cyst of skin    2. BMI 32.0-32.9,adult    Other orders  -     doxycycline (VIBRAMYCIN) 100 MG Cap; Take 1 capsule (100 mg total) by mouth every 12 (twelve) hours.  Dispense: 20 capsule; Refill: 0  -     mupirocin (BACTROBAN) 2 % ointment; by Nasal route 2 (two) times daily.  Dispense: 22 g; Refill: 0  -     ondansetron (ZOFRAN) 4 MG tablet; Take 1 tablet (4 mg total) by mouth every 6 (six) hours as needed for Nausea.  Dispense: 20 tablet; Refill: 0      Dictation #1  MRN:[id number]  CSN:[id number]      Keep area clean and dry.    No follow-ups on file.

## 2024-05-01 ENCOUNTER — HOSPITAL ENCOUNTER (OUTPATIENT)
Dept: RADIOLOGY | Facility: HOSPITAL | Age: 54
Discharge: HOME | End: 2024-05-01
Attending: FAMILY MEDICINE
Payer: MEDICAID

## 2024-05-01 VITALS — WEIGHT: 168 LBS | BODY MASS INDEX: 32.98 KG/M2 | HEIGHT: 60 IN

## 2024-05-01 DIAGNOSIS — Z91.89 AT HIGH RISK FOR BREAST CANCER: ICD-10-CM

## 2024-05-01 PROCEDURE — 77066 DX MAMMO INCL CAD BI: CPT | Mod: TC,PO

## 2024-05-01 PROCEDURE — 77066 DX MAMMO INCL CAD BI: CPT | Mod: 26,,, | Performed by: RADIOLOGY

## 2024-05-01 PROCEDURE — 77062 BREAST TOMOSYNTHESIS BI: CPT | Mod: 26,,, | Performed by: RADIOLOGY

## 2024-05-21 ENCOUNTER — OFFICE VISIT (OUTPATIENT)
Dept: PSYCHIATRY | Facility: CLINIC | Age: 54
End: 2024-05-21
Payer: MEDICAID

## 2024-05-21 VITALS
BODY MASS INDEX: 33.5 KG/M2 | SYSTOLIC BLOOD PRESSURE: 112 MMHG | DIASTOLIC BLOOD PRESSURE: 79 MMHG | HEIGHT: 60 IN | WEIGHT: 170.63 LBS | HEART RATE: 63 BPM

## 2024-05-21 DIAGNOSIS — F33.2 SEVERE EPISODE OF RECURRENT MAJOR DEPRESSIVE DISORDER, WITHOUT PSYCHOTIC FEATURES: Primary | ICD-10-CM

## 2024-05-21 DIAGNOSIS — Z59.86 FINANCIAL INSECURITY: ICD-10-CM

## 2024-05-21 DIAGNOSIS — G47.00 INSOMNIA, UNSPECIFIED TYPE: ICD-10-CM

## 2024-05-21 DIAGNOSIS — F41.1 GENERALIZED ANXIETY DISORDER: ICD-10-CM

## 2024-05-21 PROCEDURE — 99999 PR PBB SHADOW E&M-EST. PATIENT-LVL I: CPT | Mod: PBBFAC,AJ,HB, | Performed by: SOCIAL WORKER

## 2024-05-21 PROCEDURE — 1160F RVW MEDS BY RX/DR IN RCRD: CPT | Mod: AF,HB,CPTII, | Performed by: STUDENT IN AN ORGANIZED HEALTH CARE EDUCATION/TRAINING PROGRAM

## 2024-05-21 PROCEDURE — 3008F BODY MASS INDEX DOCD: CPT | Mod: AF,HB,CPTII, | Performed by: STUDENT IN AN ORGANIZED HEALTH CARE EDUCATION/TRAINING PROGRAM

## 2024-05-21 PROCEDURE — 1159F MED LIST DOCD IN RCRD: CPT | Mod: AF,HB,CPTII, | Performed by: STUDENT IN AN ORGANIZED HEALTH CARE EDUCATION/TRAINING PROGRAM

## 2024-05-21 PROCEDURE — 99211 OFF/OP EST MAY X REQ PHY/QHP: CPT | Mod: PBBFAC,25,27,PN | Performed by: SOCIAL WORKER

## 2024-05-21 PROCEDURE — 90837 PSYTX W PT 60 MINUTES: CPT | Mod: AJ,HB,, | Performed by: SOCIAL WORKER

## 2024-05-21 PROCEDURE — 3078F DIAST BP <80 MM HG: CPT | Mod: AF,HB,CPTII, | Performed by: STUDENT IN AN ORGANIZED HEALTH CARE EDUCATION/TRAINING PROGRAM

## 2024-05-21 PROCEDURE — 99214 OFFICE O/P EST MOD 30 MIN: CPT | Mod: S$PBB,AF,HB, | Performed by: STUDENT IN AN ORGANIZED HEALTH CARE EDUCATION/TRAINING PROGRAM

## 2024-05-21 PROCEDURE — G0136 PR ADMINISTRATION, SOCIAL DETERMINANT ASSESSMNT, 5-15 MIN: HCPCS | Mod: 33,S$PBB,AF,HB | Performed by: STUDENT IN AN ORGANIZED HEALTH CARE EDUCATION/TRAINING PROGRAM

## 2024-05-21 PROCEDURE — 99999 PR PBB SHADOW E&M-EST. PATIENT-LVL III: CPT | Mod: PBBFAC,AF,HB, | Performed by: STUDENT IN AN ORGANIZED HEALTH CARE EDUCATION/TRAINING PROGRAM

## 2024-05-21 PROCEDURE — G0136 PR ADMINISTRATION, SOCIAL DETERMINANT ASSESSMNT, 5-15 MIN: HCPCS | Mod: 33,PBBFAC,PN | Performed by: STUDENT IN AN ORGANIZED HEALTH CARE EDUCATION/TRAINING PROGRAM

## 2024-05-21 PROCEDURE — 96136 PSYCL/NRPSYC TST PHY/QHP 1ST: CPT | Mod: 59,AF,HB, | Performed by: STUDENT IN AN ORGANIZED HEALTH CARE EDUCATION/TRAINING PROGRAM

## 2024-05-21 PROCEDURE — 3074F SYST BP LT 130 MM HG: CPT | Mod: AF,HB,CPTII, | Performed by: STUDENT IN AN ORGANIZED HEALTH CARE EDUCATION/TRAINING PROGRAM

## 2024-05-21 PROCEDURE — 99213 OFFICE O/P EST LOW 20 MIN: CPT | Mod: PBBFAC,PN,25 | Performed by: STUDENT IN AN ORGANIZED HEALTH CARE EDUCATION/TRAINING PROGRAM

## 2024-05-21 PROCEDURE — 1159F MED LIST DOCD IN RCRD: CPT | Mod: AJ,HB,CPTII, | Performed by: SOCIAL WORKER

## 2024-05-21 RX ORDER — FLUOXETINE HYDROCHLORIDE 20 MG/1
CAPSULE ORAL
Qty: 30 CAPSULE | Refills: 1 | Status: SHIPPED | OUTPATIENT
Start: 2024-05-21

## 2024-05-21 RX ORDER — TRAZODONE HYDROCHLORIDE 150 MG/1
TABLET ORAL
Qty: 30 TABLET | Refills: 1 | Status: SHIPPED | OUTPATIENT
Start: 2024-05-21

## 2024-05-21 RX ORDER — FLUOXETINE HYDROCHLORIDE 40 MG/1
CAPSULE ORAL
Qty: 30 CAPSULE | Refills: 1 | Status: SHIPPED | OUTPATIENT
Start: 2024-05-21

## 2024-05-21 RX ORDER — LURASIDONE HYDROCHLORIDE 40 MG/1
40 TABLET, FILM COATED ORAL DAILY
Qty: 30 TABLET | Refills: 1 | Status: SHIPPED | OUTPATIENT
Start: 2024-05-21

## 2024-05-21 RX ORDER — TRAZODONE HYDROCHLORIDE 100 MG/1
TABLET ORAL
Qty: 30 TABLET | Refills: 1 | Status: SHIPPED | OUTPATIENT
Start: 2024-05-21

## 2024-05-21 SDOH — SOCIAL DETERMINANTS OF HEALTH (SDOH): FINANCIAL INSECURITY: Z59.86

## 2024-05-21 NOTE — PROGRESS NOTES
"    Outpatient Psychiatry Followup Visit (DO/MD/NP, etc.)    5/21/2024  Assessment & Plan    Assessment - Plan:     Impression     ICD-10-CM ICD-9-CM   1. Severe episode of recurrent major depressive disorder, without psychotic features  F33.2 296.33   2. Generalized anxiety disorder  F41.1 300.02   3. Insomnia, unspecified type  G47.00 780.52   4. BMI 33.0-33.9,adult  Z68.33 V85.33        Plan of Care & Medication Management    Chart was reviewed. The risks and benefits of medication were discussed with pt. The treatment plan and followup plan were reviewed with pt. Pt understands to contact clinic if symptoms worsen. Pt understands to call 911 or go to nearest ER for suicidal ideation, intent or plan.   RX History ABILIFY (affected driving), CYMBALTA, BUSPAR (NO response), EFFEXOR (didn't work), LEXAPRO (nausea, dizzy, tinnitus), PROZAC (dry mouth), TRAZODONE, TRINTELLIX (didn't work), VIIBRYD (didn't work) and WELLBUTRIN (NO response at 300mg)      Current RX 10/24/2023: DDAS 50920; RX "UNCOMMON OR MULTIPLE ANTIDEPRESSANTS", RE 23%  PG on chart  28NOV2023: Decided on starting augmentation with BUSPAR, reassess in 2 weeks. Plans B and C: Depending on response, will consider ABILIFY as alternative. If both fail, considering switching PROZAC to PAMELOR  Continue JACK  Pt was provided NEI educational material 1/11/2024.  1/11/2024, 12/12/2023: Discussed standard serotonin syndrome contingency plan: Stop taking and reach out to the clinic for worsening of insomnia, confusion, agitation or restlessness, tremors, rising blood pressure, sweating or rapid heart rate.  Metabolic monitoring:  AUG2023 lipids acceptable  41ALV2404 BMI 32.8  A1C needed  Adjustments:  53OUW5416: Increase to 40mg daily with 350+ Cals  11JAN2024: Start 20mg daily with 350+ Cals  Prior to JAN2024 pt attempted a trial of ABILIFY  Continue PROZAC  Adjustments:  11YQN1222: Increase to 60mg daily  60ZQC9048: Increase to 40mg daily  7FYQ5613: Start " 20mg daily (after 1 week washout from VIIBRYD)  Prior to 36DPQ4507 pt had been taking  TRAZODONE 250mg HS and VIIBRYD 40mg daily  Continue TRAZODONE  Monitor for daytime sedation  Adjustments:  92HMI5938: Adjust to 250mg HS (how pt reports taking)  46XGY2526: Adjust to 250mg HS, 50mg daily  33IHK8435: Increase to 300mg HS  88UGR9670: Increase to 250mg HS  60RLS7262: Increase to 200mg HS  11HJT1379: Increase to 150mg HS  55UMD2037: Increase to 100mg HS  04JZJ8654: Increase to 50mg HS (scheduled)  Prior to evaluation pt had been taking 50mg HS PRN INSOMNIA   Education & Counseling RX administration and adherence   Other Orders Continue individual psychotherapy  Referral to [facility] (pending)   Monitor VITAL SIGNS  Instruments: PROMIS (A&D), PSS4   RETURN S: RETURN IN 8 WEEKS (TWO MONTHS), and reassess frequency within two visits from now  Continue medication management     Subjective    Interval History:     Available documentation has been reviewed, and pertinent elements of the chart have been incorporated into this note where appropriate. Last [facility] encounter with writer was on 3/21/2024   Mirtha Stewart, a 54 y.o. female, presenting for followup visit.      Since last visit, reports overall about the same.    Had some fun with [place] trip.    Workman's comp NO longer paying, still looking for a job. Frustrating and disappointing.    Hasn't started at [facility] yet but encouraged to do so.     Labs incomplete.    Completed [facility] survey questions.    Encouraged to complete enrollment at [facility]       Unless otherwise specified, pt did NOT display signs of nor endorse symptoms of overt psychosis or acute mood disorder requiring hospitalization during the encounter; pt denied violent thoughts or suicidal or homicidal ideation, intent, or plan.         Objective    Measurement-Based Care (MBC):     Routine Instruments   PROMIS-ANXIETY Interpretation: 19 (4a raw score): T-SCORE 77.9; SEVERE using 55-60-70 cutoffs.  "  PROMIS-DEPRESSION Interpretation: 20 (4a raw score): T-SCORE 79.4; SEVERE using 55-60-70 cutoffs.   PSS4 Interpretation: 14/16; HIGH using 6-11 cutoffs.   Additional Instruments   N/A     Current Evaluation of Mental Status:     Constitutional / General       Vitals:    05/21/24 1417   BP: 112/79   Pulse: 63   Weight: 77.4 kg (170 lb 10.2 oz)   Height: 5' (1.524 m)       Psychiatric / Mental Status Examination  1. Appearance: Dress is informal but appropriate. Motor activity normal.  2. Discourse: Clear speech with normal rate and volume. Associations intact. Orderly.  3. Emotional Expression: Anxious and depressed mood. Affect is appropriate.  4. Perception and Thinking: No hallucinations. No suicidality, no homicidality, delusions, or paranoia.  5. Sensorium: Grossly intact. Able to focus for interview.  6. Memory and Fund of Knowledge: Intact for content of interview.  7. Insight and Judgment: Intact.               Billing Documentation:     Method of Encounter IN PERSON visit at the clinic   Type of Encounter Follow up visit with me   Counseling;  Psychotherapy    Counseling;  Tobacco and/or Nicotine    Additional Codes and Modifiers 32835, with modifer 59: administered and scored more than one psychological or neuropsychological tests (see MBC above) (16+ mins)  , with modifier 33: administered and scored social determinants of health (5-15 mins)   Time Remaining Chart/Pt 14980: FOLLOW UP VISIT, Rx mgmt, "Multiple STABLE chronic illnesses"   Total Mins  (5/21/2024) N/A - Not billing for time        Juno Valdovinos DO  Department of Psychiatry, [facility]        "

## 2024-05-24 NOTE — PROGRESS NOTES
Individual Psychotherapy (PhD/LCSW)    5/21/2024    Site:  [facility]         Therapeutic Intervention: Met with patient.  Outpatient - Insight oriented psychotherapy 60 min - CPT code 93942, Outpatient - Behavior modifying psychotherapy 60 min - CPT code 14691, and Outpatient - Supportive psychotherapy 60 min - CPT Code 69049    Chief complaint/reason for encounter: depression, anxiety, and adjustment d/o             Interval history and content of current session: : Ct was referred to tx by Dr. Valdovinos to address depression and anxiety. Ct arrived to session and was fully engaged.  Ct shared that she continues to have stress about her financial and employment future. Ct shared that she is looking for work and has submitted several job applications but has not heard anything. She reported that her parents have helped her financially and with the money that she does have, she's getting caught up on major necessities like AD[employer], property taxes etc. Ct did say that she went to [place] with her parents and she did have a relaxing time getting away for a few days with her family. SW and ct discussed ct attending IOP to add structure to her days and to get support. Ct shared that she was open and receptive to going. SW will follow up with referral. SW and ct processed how hard it is to focus on other areas in her life when she is experiencing financial stress. 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 Ct reported that she plans to get her bike fixed and start riding the trail again and get back to the gym. SW praised ct for working on herself. Ct to continue in 1:1 sessions.       Treatment plan:  Target symptoms: depression, anxiety , adjustment  Why chosen therapy is appropriate versus another modality: relevant to diagnosis, patient responds to this modality, evidence based practice  Outcome monitoring methods: self-report  Therapeutic intervention  type: insight oriented psychotherapy, behavior modifying psychotherapy, supportive psychotherapy    Risk parameters:  Patient reports no suicidal ideation  Patient reports no homicidal ideation  Patient reports no self-injurious behavior  Patient reports no violent behavior    CSSRS was completed:     Mental Status Exam:  General Appearance:  unremarkable, age appropriate   Speech: normal tone, normal rate, normal pitch, normal volume      Level of Cooperation: cooperative      Thought Processes: normal and logical   Mood: steady, sad      Thought Content: normal, no suicidality, no homicidality, delusions, or paranoia   Affect: congruent and appropriate   Orientation: Oriented x3   Memory: recent >  intact   Attention Span & Concentration: intact   Fund of General Knowledge: intact and appropriate to age and level of education   Abstract Reasoning: interpretation of similarities was abstract   Judgment & Insight: fair, intact     Language intact       Verbal deficits: None    Patient's response to intervention:  The patient's response to intervention is accepting.    Progress toward goals and other mental status changes:  The patient's progress toward goals is fair .    Diagnosis:     ICD-10-CM ICD-9-CM   1. Severe episode of recurrent major depressive disorder, without psychotic features  F33.2 296.33   2. Financial insecurity  Z59.86 V60.2       Plan:  individual psychotherapy and ct to follow up with Dr. Valdovinos for psych med mgt  Pt to go to ED or call 911 if symptoms worsen or if she has thoughts of harming self and/or others. Pt verbalized understanding.    Return to clinic: as scheduled    Length of Service (minutes): 60      A portion of this note was created using [employer] voice recognition software that occasionally misinterprets phrases or words.    Each patient to whom he or she provides medical services by telemedicine is: (1) informed of the relationship between the physician and patient and the  respective role of any other health care provider with respect to management of the patient; and (2) notified that he or she may decline to receive medical services by telemedicine and may withdraw from such care at any time.

## 2024-07-11 ENCOUNTER — HOSPITAL ENCOUNTER (OUTPATIENT)
Facility: HOSPITAL | Age: 54
Discharge: HOME | End: 2024-07-11
Attending: INTERNAL MEDICINE | Admitting: INTERNAL MEDICINE
Payer: MEDICAID

## 2024-07-11 ENCOUNTER — ANESTHESIA EVENT (OUTPATIENT)
Dept: ENDOSCOPY | Facility: HOSPITAL | Age: 54
End: 2024-07-11
Payer: MEDICAID

## 2024-07-11 ENCOUNTER — ANESTHESIA (OUTPATIENT)
Dept: ENDOSCOPY | Facility: HOSPITAL | Age: 54
End: 2024-07-11
Payer: MEDICAID

## 2024-07-11 VITALS
HEART RATE: 73 BPM | BODY MASS INDEX: 30.43 KG/M2 | SYSTOLIC BLOOD PRESSURE: 133 MMHG | WEIGHT: 155 LBS | HEIGHT: 60 IN | RESPIRATION RATE: 14 BRPM | OXYGEN SATURATION: 99 % | DIASTOLIC BLOOD PRESSURE: 78 MMHG | TEMPERATURE: 98 F

## 2024-07-11 DIAGNOSIS — Z12.11 COLON CANCER SCREENING: ICD-10-CM

## 2024-07-11 PROCEDURE — 37000008 HC ANESTHESIA 1ST 15 MINUTES: Performed by: INTERNAL MEDICINE

## 2024-07-11 PROCEDURE — 45378 DIAGNOSTIC COLONOSCOPY: CPT | Performed by: INTERNAL MEDICINE

## 2024-07-11 PROCEDURE — 25000003 PHARM REV CODE 250: Performed by: NURSE ANESTHETIST, CERTIFIED REGISTERED

## 2024-07-11 PROCEDURE — 25000003 PHARM REV CODE 250: Performed by: INTERNAL MEDICINE

## 2024-07-11 PROCEDURE — 45378 DIAGNOSTIC COLONOSCOPY: CPT | Mod: ,,, | Performed by: INTERNAL MEDICINE

## 2024-07-11 PROCEDURE — 63600175 PHARM REV CODE 636 W HCPCS: Performed by: NURSE ANESTHETIST, CERTIFIED REGISTERED

## 2024-07-11 PROCEDURE — 37000009 HC ANESTHESIA EA ADD 15 MINS: Performed by: INTERNAL MEDICINE

## 2024-07-11 RX ORDER — PROPOFOL 10 MG/ML
VIAL (ML) INTRAVENOUS
Status: DISCONTINUED | OUTPATIENT
Start: 2024-07-11 | End: 2024-07-11

## 2024-07-11 RX ORDER — LIDOCAINE HYDROCHLORIDE 20 MG/ML
INJECTION INTRAVENOUS
Status: DISCONTINUED | OUTPATIENT
Start: 2024-07-11 | End: 2024-07-11

## 2024-07-11 RX ORDER — SODIUM CHLORIDE 9 MG/ML
INJECTION, SOLUTION INTRAVENOUS CONTINUOUS
Status: DISCONTINUED | OUTPATIENT
Start: 2024-07-11 | End: 2024-07-11 | Stop reason: HOSPADM

## 2024-07-11 RX ADMIN — PROPOFOL 100 MG: 10 INJECTION, EMULSION INTRAVENOUS at 11:07

## 2024-07-11 RX ADMIN — LIDOCAINE HYDROCHLORIDE 100 MG: 20 INJECTION, SOLUTION INTRAVENOUS at 11:07

## 2024-07-11 RX ADMIN — PROPOFOL 50 MG: 10 INJECTION, EMULSION INTRAVENOUS at 11:07

## 2024-07-11 RX ADMIN — SODIUM CHLORIDE: 9 INJECTION, SOLUTION INTRAVENOUS at 10:07

## 2024-07-11 NOTE — ANESTHESIA PREPROCEDURE EVALUATION
07/11/2024  Mirtha Stewart is a 54 y.o., female.      Pre-op Assessment    I have reviewed the NPO Status.   I have reviewed the Medications.     Review of Systems  Anesthesia Hx:  No problems with previous Anesthesia                Cardiovascular:     Hypertension                                  Hypertension         Hepatic/GI:  Bowel Prep. PUD,  GERD      Gerd, Peptic Ulcer Disease          Musculoskeletal:  Arthritis        Arthritis          Neurological:      Arthritis                           Psych:  Psychiatric History                  Physical Exam  General: Well nourished    Airway:  Mallampati: II   Mouth Opening: Normal  TM Distance: Normal  Tongue: Normal  Neck ROM: Normal ROM    Dental:  Intact    Chest/Lungs:  Clear to auscultation, Normal Respiratory Rate        Anesthesia Plan  Type of Anesthesia, risks & benefits discussed:    Anesthesia Type: Gen Natural Airway  Intra-op Monitoring Plan: Standard ASA Monitors  Induction:  IV  Informed Consent: Informed consent signed with the Patient and all parties understand the risks and agree with anesthesia plan.  All questions answered.   ASA Score: 2    Ready For Surgery From Anesthesia Perspective.     .

## 2024-07-11 NOTE — [employer] PATIENT INSTRUCTIONS
Discharge Summary/Instructions after an Endoscopic Procedure  Patient Name: Mirtha Stewart  Patient MRN: [id number]  Patient YOB: 1970  Thursday, July 11, 2024  Bee Hart MD  Dear patient,  As a result of recent federal legislation (The Federal Cures Act), you may   receive lab or pathology results from your procedure in your [facility]   account before your physician is able to contact you. Your physician or   their representative will relay the results to you with their   recommendations at their soonest availability.  Thank you,  RESTRICTIONS:  During your procedure today, you received medications for sedation.  These   medications may affect your judgment, balance and coordination.  Therefore,   for 24 hours, you have the following restrictions:   - DO NOT drive a car, operate machinery, make legal/financial decisions,   sign important papers or drink alcohol.    ACTIVITY:  Today: no heavy lifting, straining or running due to procedural   sedation/anesthesia.  The following day: return to full activity including work.  DIET:  Eat and drink normally unless instructed otherwise.     TREATMENT FOR COMMON SIDE EFFECTS:  - Mild abdominal pain, nausea, belching, bloating or excessive gas:  rest,   eat lightly and use a heating pad.  - Sore Throat: treat with throat lozenges and/or gargle with warm salt   water.  - Because air was used during the procedure, expelling large amounts of air   from your rectum or belching is normal.  - If a bowel prep was taken, you may not have a bowel movement for 1-3 days.    This is normal.  SYMPTOMS TO WATCH FOR AND REPORT TO YOUR PHYSICIAN:  1. Abdominal pain or bloating, other than gas cramps.  2. Chest pain.  3. Back pain.  4. Signs of infection such as: chills or fever occurring within 24 hours   after the procedure.  5. Rectal bleeding, which would show as bright red, maroon, or black stools.   (A tablespoon of blood from the rectum is not serious,  especially if   hemorrhoids are present.)  6. Vomiting.  7. Weakness or dizziness.  GO DIRECTLY TO THE NEAREST EMERGENCY ROOM IF YOU HAVE ANY OF THE FOLLOWING:      Difficulty breathing              Chills and/or fever over 101 F   Persistent vomiting and/or vomiting blood   Severe abdominal pain   Severe chest pain   Black, tarry stools   Bleeding- more than one tablespoon   Any other symptom or condition that you feel may need urgent attention  Your doctor recommends these additional instructions:  If any biopsies were taken, your doctors clinic will contact you in 1 to 2   weeks with any results.  - Discharge patient to home (with escort).   - Patient has a contact number available for emergencies.  The signs and   symptoms of potential delayed complications were discussed with the   patient.  Return to normal activities tomorrow.  Written discharge   instructions were provided to the patient.   - Resume previous diet.   - Continue present medications.   - Repeat colonoscopy in 10 years for screening purposes.   - Return to my office PRN.  For questions, problems or results please call your physician - Bee Hart MD at Work:  [phone].  [facility], EMERGENCY ROOM PHONE NUMBER: [phone]  IF A COMPLICATION OR EMERGENCY SITUATION ARISES AND YOU ARE UNABLE TO REACH   YOUR PHYSICIAN - GO DIRECTLY TO THE EMERGENCY ROOM.  Bee Hart MD  7/11/2024 12:06:10 PM  This report has been verified and signed electronically.  Dear patient,  As a result of recent federal legislation (The Federal Cures Act), you may   receive lab or pathology results from your procedure in your [facility]   account before your physician is able to contact you. Your physician or   their representative will relay the results to you with their   recommendations at their soonest availability.  Thank you,  PROVATION

## 2024-07-11 NOTE — H&P
[facility] Note    CC: Screening colonoscopy    HPI 54 y.o. female presents for initial routine screening colonoscopy    Past Medical History:   Diagnosis Date    Mixed hyperlipidemia     PUD (peptic ulcer disease)     teenager       Allergies and Medications reviewed     Review of Systems  General ROS: negative for - chills, fever or weight loss  Cardiovascular ROS: no chest pain or dyspnea on exertion  Gastrointestinal ROS: no blood in stool    Physical Examination  BP (!) 151/80 (BP Location: Left arm, Patient Position: Lying)   Pulse 72   Temp 97.9 °F (36.6 °C) (Skin)   Resp 16   Ht 5' (1.524 m)   Wt 70.3 kg (155 lb)   SpO2 97%   Breastfeeding No   BMI 30.27 kg/m²   General appearance: alert, cooperative, no distress  HENT: Normocephalic, atraumatic, neck symmetrical, no nasal discharge, sclera anicteric   Lungs: clear to auscultation bilaterally, symmetric chest wall expansion bilaterally  Heart: regular rate and rhythm without rub; no displacement of the PMI   Abdomen: soft  Extremities: extremities symmetric; no clubbing, cyanosis, or edema        Labs:  Lab Results   Component Value Date    WBC 5.07 08/16/2023    HGB 11.2 (L) 08/16/2023    HCT 36.7 (L) 08/16/2023    MCV 62 (L) 08/16/2023     08/16/2023             Assessment:   54 y.o. female presents for colonoscopy    Plan:  Proceed to colonoscopy     Bee Hart MD  [facility]  [address], [address]  [place] [address]  Office: [phone]  Fax: [phone]

## 2024-07-11 NOTE — TRANSFER OF CARE
Anesthesia Transfer of Care Note    Patient: Mirtha Stewart    Procedure(s) Performed: Procedure(s) (LRB):  COLONOSCOPY (N/A)    Patient location: GI    Anesthesia Type: general    Transport from OR: Transported from OR on room air with adequate spontaneous ventilation    Post pain: adequate analgesia    Post assessment: no apparent anesthetic complications and tolerated procedure well    Post vital signs: stable    Level of consciousness: awake, alert and oriented    Nausea/Vomiting: no nausea/vomiting    Complications: none    Transfer of care protocol was followed      Last vitals: Visit Vitals  BP (!) 151/80 (BP Location: Left arm, Patient Position: Lying)   Pulse 72   Temp 36.6 °C (97.9 °F) (Skin)   Resp 16   Ht 5' (1.524 m)   Wt 70.3 kg (155 lb)   SpO2 97%   Breastfeeding No   BMI 30.27 kg/m²

## 2024-07-11 NOTE — PROGRESS NOTES
Pt awake alert oriented VSS Pt verbalizes readiness for discharge Discharge instructions reviewed with pt and daughter  Verbalizes understanding  IV discontinued with cath intact All belongings returned to patient  Discharged to car via wheelchair Safety maintained

## 2024-07-11 NOTE — ANESTHESIA POSTPROCEDURE EVALUATION
Anesthesia Post Evaluation    Patient: Mirtha Stewart    Procedure(s) Performed: Procedure(s) (LRB):  COLONOSCOPY (N/A)    Final Anesthesia Type: general      Patient location during evaluation: PACU  Patient participation: Yes- Able to Participate  Level of consciousness: awake and alert and oriented  Post-procedure vital signs: reviewed and stable  Pain management: adequate  Airway patency: patent    PONV status at discharge: No PONV  Anesthetic complications: no      Cardiovascular status: blood pressure returned to baseline  Respiratory status: unassisted, spontaneous ventilation and room air  Hydration status: euvolemic  Follow-up not needed.              Vitals Value Taken Time   /78 07/11/24 1145   Temp 36.7 °C (98.1 °F) 07/11/24 1130   Pulse 73 07/11/24 1145   Resp 14 07/11/24 1145   SpO2 99 % 07/11/24 1145         Event Time   Out of Recovery 12:15:00         Pain/John Score: John Score: 10 (7/11/2024 11:50 AM)

## 2024-07-23 ENCOUNTER — OFFICE VISIT (OUTPATIENT)
Dept: PSYCHIATRY | Facility: CLINIC | Age: 54
End: 2024-07-23
Payer: MEDICAID

## 2024-07-23 VITALS
BODY MASS INDEX: 32.33 KG/M2 | WEIGHT: 164.69 LBS | SYSTOLIC BLOOD PRESSURE: 113 MMHG | DIASTOLIC BLOOD PRESSURE: 81 MMHG | HEIGHT: 60 IN | HEART RATE: 77 BPM

## 2024-07-23 DIAGNOSIS — F41.1 GENERALIZED ANXIETY DISORDER: ICD-10-CM

## 2024-07-23 DIAGNOSIS — F33.2 SEVERE EPISODE OF RECURRENT MAJOR DEPRESSIVE DISORDER, WITHOUT PSYCHOTIC FEATURES: Primary | ICD-10-CM

## 2024-07-23 DIAGNOSIS — Z59.86 FINANCIAL INSECURITY: ICD-10-CM

## 2024-07-23 DIAGNOSIS — G47.00 INSOMNIA, UNSPECIFIED TYPE: ICD-10-CM

## 2024-07-23 PROCEDURE — 90834 PSYTX W PT 45 MINUTES: CPT | Mod: AJ,HB,, | Performed by: SOCIAL WORKER

## 2024-07-23 PROCEDURE — 1160F RVW MEDS BY RX/DR IN RCRD: CPT | Mod: AF,HB,CPTII, | Performed by: STUDENT IN AN ORGANIZED HEALTH CARE EDUCATION/TRAINING PROGRAM

## 2024-07-23 PROCEDURE — 99214 OFFICE O/P EST MOD 30 MIN: CPT | Mod: S$PBB,AF,HB, | Performed by: STUDENT IN AN ORGANIZED HEALTH CARE EDUCATION/TRAINING PROGRAM

## 2024-07-23 PROCEDURE — 3074F SYST BP LT 130 MM HG: CPT | Mod: AF,HB,CPTII, | Performed by: STUDENT IN AN ORGANIZED HEALTH CARE EDUCATION/TRAINING PROGRAM

## 2024-07-23 PROCEDURE — 1159F MED LIST DOCD IN RCRD: CPT | Mod: AJ,HB,CPTII, | Performed by: SOCIAL WORKER

## 2024-07-23 PROCEDURE — 1159F MED LIST DOCD IN RCRD: CPT | Mod: AF,HB,CPTII, | Performed by: STUDENT IN AN ORGANIZED HEALTH CARE EDUCATION/TRAINING PROGRAM

## 2024-07-23 PROCEDURE — 96136 PSYCL/NRPSYC TST PHY/QHP 1ST: CPT | Mod: 59,AF,HB, | Performed by: STUDENT IN AN ORGANIZED HEALTH CARE EDUCATION/TRAINING PROGRAM

## 2024-07-23 PROCEDURE — 99999 PR PBB SHADOW E&M-EST. PATIENT-LVL III: CPT | Mod: PBBFAC,AF,HB, | Performed by: STUDENT IN AN ORGANIZED HEALTH CARE EDUCATION/TRAINING PROGRAM

## 2024-07-23 PROCEDURE — 99999 PR PBB SHADOW E&M-EST. PATIENT-LVL I: CPT | Mod: PBBFAC,AJ,HB, | Performed by: SOCIAL WORKER

## 2024-07-23 PROCEDURE — 3079F DIAST BP 80-89 MM HG: CPT | Mod: AF,HB,CPTII, | Performed by: STUDENT IN AN ORGANIZED HEALTH CARE EDUCATION/TRAINING PROGRAM

## 2024-07-23 PROCEDURE — 99211 OFF/OP EST MAY X REQ PHY/QHP: CPT | Mod: PBBFAC,27,PN | Performed by: SOCIAL WORKER

## 2024-07-23 PROCEDURE — 3008F BODY MASS INDEX DOCD: CPT | Mod: AF,HB,CPTII, | Performed by: STUDENT IN AN ORGANIZED HEALTH CARE EDUCATION/TRAINING PROGRAM

## 2024-07-23 PROCEDURE — 99213 OFFICE O/P EST LOW 20 MIN: CPT | Mod: PBBFAC,PN | Performed by: STUDENT IN AN ORGANIZED HEALTH CARE EDUCATION/TRAINING PROGRAM

## 2024-07-23 RX ORDER — TRAZODONE HYDROCHLORIDE 150 MG/1
TABLET ORAL
Qty: 30 TABLET | Refills: 1 | Status: SHIPPED | OUTPATIENT
Start: 2024-07-23

## 2024-07-23 RX ORDER — TRAZODONE HYDROCHLORIDE 100 MG/1
TABLET ORAL
Qty: 30 TABLET | Refills: 1 | Status: SHIPPED | OUTPATIENT
Start: 2024-07-23

## 2024-07-23 RX ORDER — LURASIDONE HYDROCHLORIDE 60 MG/1
60 TABLET, FILM COATED ORAL DAILY
Qty: 30 TABLET | Refills: 1 | Status: SHIPPED | OUTPATIENT
Start: 2024-07-23 | End: 2024-09-21

## 2024-07-23 RX ORDER — FLUOXETINE HYDROCHLORIDE 40 MG/1
CAPSULE ORAL
Qty: 30 CAPSULE | Refills: 1 | Status: SHIPPED | OUTPATIENT
Start: 2024-07-23

## 2024-07-23 RX ORDER — FLUOXETINE HYDROCHLORIDE 20 MG/1
CAPSULE ORAL
Qty: 30 CAPSULE | Refills: 1 | Status: SHIPPED | OUTPATIENT
Start: 2024-07-23

## 2024-07-23 SDOH — SOCIAL DETERMINANTS OF HEALTH (SDOH): FINANCIAL INSECURITY: Z59.86

## 2024-07-23 NOTE — PATIENT INSTRUCTIONS
Please complete lab work at your earliest convenience.   Increase LATUDA to 60mg daily with food    Enroll in IOP when your great niece is back in school    Continue other medications as prescribed   Keep therapy appointments

## 2024-07-23 NOTE — PROGRESS NOTES
"    Outpatient Psychiatry Followup Visit (DO/MD/NP, etc.)    7/23/2024  Assessment & Plan    Assessment - Plan:     Impression     ICD-10-CM ICD-9-CM   1. Severe episode of recurrent major depressive disorder, without psychotic features  F33.2 296.33   2. Generalized anxiety disorder  F41.1 300.02   3. Insomnia, unspecified type  G47.00 780.52   4. BMI 32.0-32.9,adult  Z68.32 V85.32        Plan of Care & Medication Management    Chart was reviewed. The risks and benefits of medication were discussed with pt. The treatment plan and followup plan were reviewed with pt. Pt understands to contact clinic if symptoms worsen. Pt understands to call 911 or go to nearest ER for suicidal ideation, intent or plan.   RX History ABILIFY (affected driving), CYMBALTA, BUSPAR (NO response), EFFEXOR (didn't work), LATUDA, LEXAPRO (nausea, dizzy, tinnitus), PROZAC (dry mouth), TRAZODONE, TRINTELLIX (didn't work), VIIBRYD (didn't work) and WELLBUTRIN (NO response at 300mg)      Current RX 10/24/2023: DDAS 96190; RX "UNCOMMON OR MULTIPLE ANTIDEPRESSANTS", RE 23%  PG on chart  28NOV2023: Decided on starting augmentation with BUSPAR, reassess in 2 weeks. Plans B and C: Depending on response, will consider ABILIFY as alternative. If both fail, considering switching PROZAC to PAMELOR  Continue LATUDA  Pt was provided [facility] educational material 1/11/2024.  1/11/2024, 12/12/2023: Discussed standard serotonin syndrome contingency plan: Stop taking and reach out to the clinic for worsening of insomnia, confusion, agitation or restlessness, tremors, rising blood pressure, sweating or rapid heart rate.  Metabolic monitoring:  AUG2023 lipids acceptable  [id number] BMI 32.8  A1C needed  Adjustments:  7/23/2024: Increase to 60mg daily with 350+ Cals  20FEB2024: Increase to 40mg daily with 350+ Cals  11JAN2024: Start 20mg daily with 350+ Cals  Prior to JAN2024 pt attempted a trial of ABILIFY  Continue PROZAC  Adjustments:  14SEP2023: Increase to 60mg " daily  00BMZ1626: Increase to 40mg daily  3RWJ1080: Start 20mg daily (after 1 week washout from VIIBRYD)  Prior to 34KAV2095 pt had been taking  TRAZODONE 250mg HS and VIIBRYD 40mg daily  Continue TRAZODONE  Monitor for daytime sedation  Adjustments:  55THK5174: Adjust to 250mg HS (how pt reports taking)  79RYO3566: Adjust to 250mg HS, 50mg daily  66VVP0007: Increase to 300mg HS  55HVP6356: Increase to 250mg HS  91OIK5031: Increase to 200mg HS  19VBK6565: Increase to 150mg HS  43GSR3193: Increase to 100mg HS  38QOY0483: Increase to 50mg HS (scheduled)  Prior to evaluation pt had been taking 50mg HS PRN INSOMNIA   Education & Counseling RX administration and adherence   Other Orders Continue individual psychotherapy  PENDING from past encounter: IOP  PENDING from past encounter: labs   Monitor VITAL SIGNS  Instruments: PROMIS (A&D), PSS4   RETURN R: RETURN IN 8 WEEKS (TWO MONTHS), and reassess frequency within three visits from now  Continue medication management     Subjective    Interval History:     Available documentation has been reviewed, and pertinent elements of the chart have been incorporated into this note where appropriate. Last Epic encounter with writer was on 5/21/2024   Mirtha Stewart, a 54 y.o. female, presenting for followup visit. Accompanied by great niece.      Since last visit, reports overall about the same.    Hasn't yet started IOP because babysitting great niece. Plan is to start IOP after the summer break. Encouraged to enroll when niece is back in school    Meeting with therapist    Looking for a job, doing [employer] in meantime    Labs pending, encouraged to complete    Depressed. NO dark thoughts    Sleeping poor.    Taking meds as prescribed    Discussed increasing LATUDA       Unless otherwise specified, pt did NOT display signs of nor endorse symptoms of overt psychosis or acute mood disorder requiring hospitalization during the encounter; pt denied violent thoughts or suicidal or  "homicidal ideation, intent, or plan.         Objective    Measurement-Based Care (MBC):     Routine Instruments   PROMIS-ANXIETY Interpretation: 19 (4a raw score): T-SCORE 77.9; SEVERE using 55-60-70 cutoffs.   PROMIS-DEPRESSION Interpretation: 20 (4a raw score): T-SCORE 79.4; SEVERE using 55-60-70 cutoffs.   PSS4 Interpretation: 12/16; HIGH using 6-11 cutoffs. 2 PH, 1 LSE. High perceived helplessness; pt strongly experiences a lack of control over responses to stress. Moderate lack of self-efficacy; pt moderately perceives an inability to handle problems.   Additional Instruments   N/A     Current Evaluation of Mental Status:     Constitutional / General       Vitals:    07/23/24 1407   BP: 113/81   Pulse: 77   Weight: 74.7 kg (164 lb 10.9 oz)   Height: 5' (1.524 m)       Psychiatric / Mental Status Examination  1. Appearance: Dress is informal but appropriate. Motor activity normal.  2. Discourse: Clear speech with normal rate and volume. Associations intact. Orderly.  3. Emotional Expression: Depressed mood. Affect is appropriate.  4. Perception and Thinking: No hallucinations. No suicidality, no homicidality, delusions, or paranoia.  5. Sensorium: Grossly intact. Able to focus for interview.  6. Memory and Fund of Knowledge: Intact for content of interview.  7. Insight and Judgment: Intact.               Billing Documentation:     Method of Encounter IN PERSON visit at the clinic   Type of Encounter Follow up visit with me   Counseling;  Psychotherapy    Counseling;  Tobacco and/or Nicotine    Additional Codes and Modifiers 31442, with modifer 59: administered and scored more than one psychological or neuropsychological tests (see MBC above) (16+ mins)   Time Remaining Chart/Pt 01701: FOLLOW UP VISIT, Rx mgmt, "Multiple STABLE chronic illnesses"   Total Mins  (7/23/2024) N/A - Not billing for time        Juno Valdovinos DO  Department of Psychiatry, [facility]        "

## 2024-07-24 ENCOUNTER — PATIENT MESSAGE (OUTPATIENT)
Dept: GASTROENTEROLOGY | Facility: CLINIC | Age: 54
End: 2024-07-24
Payer: MEDICAID

## 2024-07-25 NOTE — PROGRESS NOTES
Individual Psychotherapy (PhD/LCSW)    7/23/2024    Site:  [facility]         Therapeutic Intervention: Met with patient.  Outpatient - Insight oriented psychotherapy 45 min - CPT code 94946, Outpatient - Behavior modifying psychotherapy 45 min - CPT code 15861, and Outpatient - Supportive psychotherapy 45 min - CPT Code 51834    Chief complaint/reason for encounter: depression and anxiety             Interval history and content of current session:  Ct was referred to tx by Dr. Valdovinos to address depression and anxiety. Ct arrived to session and was fully engaged.  Client continues to report symptoms of depression.   asked client about doing IOP.  Client shared that as long as she is taking care of her great niece, she does not have the time in her schedule to attend group.   and client discussed the possibility of client going to IOP once school starts in a couple of weeks.  Client was open to that idea.  Client shared that she picked up a job with [employer] to try to get more income.  She reported that she still goes to the gym but not as much because of not wanting to turn down work with [employer].   encouraged client to make sure that she is doing something for self-care as she tries to figure out what her next steps are going to be financially and employment wise.  Client to continue in one-to-one sessions.      Treatment plan:  Target symptoms: depression, anxiety   Why chosen therapy is appropriate versus another modality: relevant to diagnosis, patient responds to this modality, evidence based practice  Outcome monitoring methods: self-report  Therapeutic intervention type: insight oriented psychotherapy, behavior modifying psychotherapy, supportive psychotherapy    Risk parameters:  Patient reports no suicidal ideation  Patient reports no homicidal ideation  Patient reports no self-injurious behavior  Patient reports no violent behavior    CSSRS was completed:     Mental  Status Exam:  General Appearance:  unremarkable, age appropriate   Speech: normal tone, normal rate, normal pitch, normal volume      Level of Cooperation: cooperative      Thought Processes: normal and logical   Mood: steady      Thought Content: normal, no suicidality, no homicidality, delusions, or paranoia   Affect: appropriate   Orientation: Oriented x3   Memory: recent >  intact   Attention Span & Concentration: intact   Fund of General Knowledge: intact and appropriate to age and level of education   Abstract Reasoning: interpretation of similarities was abstract   Judgment & Insight: fair, intact     Language intact       Verbal deficits: None    Patient's response to intervention:  The patient's response to intervention is accepting.    Progress toward goals and other mental status changes:  The patient's progress toward goals is fair .    Diagnosis:     ICD-10-CM ICD-9-CM   1. Severe episode of recurrent major depressive disorder, without psychotic features  F33.2 296.33   2. Generalized anxiety disorder  F41.1 300.02   3. Financial insecurity  Z59.86 V60.2       Plan:  individual psychotherapy and ct to follow up with  for psych med mgt  Pt to go to ED or call 911 if symptoms worsen or if she has thoughts of harming self and/or others. Pt verbalized understanding.    Return to clinic: as scheduled    Length of Service (minutes): 45      A portion of this note was created using [employer] voice recognition software that occasionally misinterprets phrases or words.    Each patient to whom he or she provides medical services by telemedicine is: (1) informed of the relationship between the physician and patient and the respective role of any other health care provider with respect to management of the patient; and (2) notified that he or she may decline to receive medical services by telemedicine and may withdraw from such care at any time.

## 2024-08-17 ENCOUNTER — HOSPITAL ENCOUNTER (EMERGENCY)
Facility: HOSPITAL | Age: 54
Discharge: HOME | End: 2024-08-18
Attending: EMERGENCY MEDICINE
Payer: COMMERCIAL

## 2024-08-17 VITALS
WEIGHT: 155 LBS | SYSTOLIC BLOOD PRESSURE: 139 MMHG | RESPIRATION RATE: 18 BRPM | HEIGHT: 60 IN | HEART RATE: 76 BPM | BODY MASS INDEX: 30.43 KG/M2 | DIASTOLIC BLOOD PRESSURE: 83 MMHG | TEMPERATURE: 98 F | OXYGEN SATURATION: 100 %

## 2024-08-17 DIAGNOSIS — S16.1XXA CERVICAL STRAIN, ACUTE, INITIAL ENCOUNTER: ICD-10-CM

## 2024-08-17 DIAGNOSIS — S39.012A STRAIN OF LUMBAR REGION, INITIAL ENCOUNTER: ICD-10-CM
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
## 2024-08-17 PROCEDURE — 96372 THER/PROPH/DIAG INJ SC/IM: CPT | Performed by: EMERGENCY MEDICINE

## 2024-08-17 PROCEDURE — 99285 EMERGENCY DEPT VISIT HI MDM: CPT | Mod: 25

## 2024-08-17 PROCEDURE — 63600175 PHARM REV CODE 636 W HCPCS: Performed by: EMERGENCY MEDICINE

## 2024-08-17 RX ORDER — KETOROLAC TROMETHAMINE 30 MG/ML
60 INJECTION, SOLUTION INTRAMUSCULAR; INTRAVENOUS
Status: COMPLETED | OUTPATIENT
Start: 2024-08-17 | End: 2024-08-17

## 2024-08-17 RX ADMIN — KETOROLAC TROMETHAMINE 60 MG: 30 INJECTION, SOLUTION INTRAMUSCULAR at 11:08

## 2024-08-18 RX ORDER — TIZANIDINE 4 MG/1
4 TABLET ORAL EVERY 6 HOURS PRN
Qty: 30 TABLET | Refills: 0 | Status: SHIPPED | OUTPATIENT
Start: 2024-08-18 | End: 2024-08-28

## 2024-08-18 RX ORDER — NAPROXEN 500 MG/1
500 TABLET ORAL 2 TIMES DAILY WITH MEALS
Qty: 60 TABLET | Refills: 0 | Status: SHIPPED | OUTPATIENT
Start: 2024-08-18
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
## 2024-08-18 NOTE — DISCHARGE INSTRUCTIONS
Return to the ER for worsening symptoms or for any other concerns.  Follow-up with your PCP for re-evaluation

## 2024-08-21 DIAGNOSIS — I10 HYPERTENSION, ESSENTIAL: ICD-10-CM

## 2024-09-24 ENCOUNTER — OFFICE VISIT (OUTPATIENT)
Dept: PSYCHIATRY | Facility: CLINIC | Age: 54
End: 2024-09-24
Payer: MEDICAID

## 2024-09-24 VITALS
HEART RATE: 75 BPM | BODY MASS INDEX: 33.93 KG/M2 | WEIGHT: 172.81 LBS | DIASTOLIC BLOOD PRESSURE: 87 MMHG | SYSTOLIC BLOOD PRESSURE: 125 MMHG | HEIGHT: 60 IN

## 2024-09-24 DIAGNOSIS — Z59.86 FINANCIAL INSECURITY: ICD-10-CM

## 2024-09-24 DIAGNOSIS — F33.2 SEVERE EPISODE OF RECURRENT MAJOR DEPRESSIVE DISORDER, WITHOUT PSYCHOTIC FEATURES: Primary | ICD-10-CM

## 2024-09-24 DIAGNOSIS — F41.1 GENERALIZED ANXIETY DISORDER: ICD-10-CM

## 2024-09-24 DIAGNOSIS — Z79.899 LONG TERM CURRENT USE OF ANTIPSYCHOTIC MEDICATION: ICD-10-CM

## 2024-09-24 DIAGNOSIS — Z13.220 LIPID SCREENING: ICD-10-CM

## 2024-09-24 DIAGNOSIS — G47.00 INSOMNIA, UNSPECIFIED TYPE: ICD-10-CM

## 2024-09-24 DIAGNOSIS — F34.1 DYSTHYMIA: ICD-10-CM

## 2024-09-24 PROCEDURE — 99999 PR PBB SHADOW E&M-EST. PATIENT-LVL III: CPT | Mod: PBBFAC,AF,HB, | Performed by: STUDENT IN AN ORGANIZED HEALTH CARE EDUCATION/TRAINING PROGRAM

## 2024-09-24 PROCEDURE — 99213 OFFICE O/P EST LOW 20 MIN: CPT | Mod: PBBFAC,PN | Performed by: STUDENT IN AN ORGANIZED HEALTH CARE EDUCATION/TRAINING PROGRAM

## 2024-09-24 PROCEDURE — 99999 PR PBB SHADOW E&M-EST. PATIENT-LVL I: CPT | Mod: PBBFAC,AJ,HB, | Performed by: SOCIAL WORKER

## 2024-09-24 PROCEDURE — 99211 OFF/OP EST MAY X REQ PHY/QHP: CPT | Mod: PBBFAC,27,PN | Performed by: SOCIAL WORKER

## 2024-09-24 RX ORDER — FLUOXETINE HYDROCHLORIDE 40 MG/1
CAPSULE ORAL
Qty: 30 CAPSULE | Refills: 1 | Status: SHIPPED | OUTPATIENT
Start: 2024-09-24

## 2024-09-24 RX ORDER — TRAZODONE HYDROCHLORIDE 150 MG/1
TABLET ORAL
Qty: 30 TABLET | Refills: 1 | Status: SHIPPED | OUTPATIENT
Start: 2024-09-24

## 2024-09-24 RX ORDER — TRAZODONE HYDROCHLORIDE 100 MG/1
TABLET ORAL
Qty: 30 TABLET | Refills: 1 | Status: SHIPPED | OUTPATIENT
Start: 2024-09-24

## 2024-09-24 RX ORDER — LURASIDONE HYDROCHLORIDE 60 MG/1
60 TABLET, FILM COATED ORAL DAILY
Qty: 30 TABLET | Refills: 1 | Status: SHIPPED | OUTPATIENT
Start: 2024-09-24 | End: 2024-11-23

## 2024-09-24 RX ORDER — FLUOXETINE HYDROCHLORIDE 20 MG/1
CAPSULE ORAL
Qty: 30 CAPSULE | Refills: 1 | Status: SHIPPED | OUTPATIENT
Start: 2024-09-24

## 2024-09-24 SDOH — SOCIAL DETERMINANTS OF HEALTH (SDOH): FINANCIAL INSECURITY: Z59.86
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
## 2024-09-24 NOTE — PATIENT INSTRUCTIONS
Please complete lab work at your earliest convenience. Your labs are FASTING, so no food or Calories for 8-12 hours prior to the blood draw.    Continue medications as prescribed     Return after completion of IOP
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
## 2024-10-03 ENCOUNTER — TELEPHONE (OUTPATIENT)
Dept: FAMILY MEDICINE | Facility: CLINIC | Age: 54
End: 2024-10-03
Payer: MEDICAID

## 2024-10-03 NOTE — TELEPHONE ENCOUNTER
No answer left     ----- Message from Greer sent at 10/1/2024  4:13 PM CDT -----  Regarding: fu  Contact: pt  Type:  Sooner Appointment Request    Caller is requesting a sooner appointment.  Caller declined first available appointment listed below.  Caller will not accept being placed on the waitlist and is requesting a message be sent to doctor.    Name of Caller:  PATIENT  When is the first available appointment?    Symptoms:  ear pain   Would the patient rather a call back or a response via [facility]?   Best Call Back Number:  [phone]    Additional Information:  call to be seen thanks

## 2024-11-04 ENCOUNTER — TELEPHONE (OUTPATIENT)
Dept: PSYCHIATRY | Facility: CLINIC | Age: 54
End: 2024-11-04
Payer: MEDICAID

## 2024-12-28 RX ORDER — POTASSIUM CHLORIDE 20 MEQ/1
20 TABLET, EXTENDED RELEASE ORAL
Qty: 90 TABLET | Refills: 0 | Status: SHIPPED | OUTPATIENT
Start: 2024-12-28

## 2024-12-28 RX ORDER — MELOXICAM 15 MG/1
15 TABLET ORAL
Qty: 90 TABLET | Refills: 0 | Status: SHIPPED | OUTPATIENT
Start: 2024-12-28

## 2024-12-28 NOTE — TELEPHONE ENCOUNTER
Care Due:                  Date            Visit Type   Department     Provider  --------------------------------------------------------------------------------                                EP -                              [facility]  Last Visit: 04-      [facility])   [facility]  Next Visit: None Scheduled  None         None Found                                                            Last  Test          Frequency    Reason                     Performed    Due Date  --------------------------------------------------------------------------------    CBC.........  12 months..  meloxicam................  08-   08-    CMP.........  12 months..  hydroCHLOROthiazide,       08-   08-                             meloxicam, potassium,                             rosuvastatin.............    Lipid Panel.  12 months..  rosuvastatin.............  08-   08-    Health [facility] Embedded Care Due Messages. Reference number: [id number].   12/28/2024 3:25:05 AM CST

## 2025-03-05 RX ORDER — HYDROCHLOROTHIAZIDE 25 MG/1
25 TABLET ORAL
Qty: 90 TABLET | Refills: 0 | Status: SHIPPED | OUTPATIENT
Start: 2025-03-05

## 2025-03-05 NOTE — TELEPHONE ENCOUNTER
Care Due:                  Date            Visit Type   Department     Provider  --------------------------------------------------------------------------------                                EP -                              [facility]  Last Visit: 04-      [facility]   [facility]  Next Visit: None Scheduled  None         None Found                                                            Last  Test          Frequency    Reason                     Performed    Due Date  --------------------------------------------------------------------------------    Office Visit  12 months..  meloxicam................  04- 04-    CBC.........  12 months..  meloxicam................  08-   08-    CMP.........  12 months..  hydroCHLOROthiazide,       08-   08-                             meloxicam, potassium,                             rosuvastatin.............    Lipid Panel.  12 months..  rosuvastatin.............  08-   08-    Health [facility] Embedded Care Due Messages. Reference number: [id number].   3/05/2025 5:42:03 AM CST

## 2025-04-12 RX ORDER — ROSUVASTATIN CALCIUM 20 MG/1
20 TABLET, COATED ORAL
Qty: 30 TABLET | Refills: 0 | Status: SHIPPED | OUTPATIENT
Start: 2025-04-12

## 2025-04-12 NOTE — TELEPHONE ENCOUNTER
No care due was identified.  [facility] Embedded Care Due Messages. Reference number: [id number].   4/12/2025 5:42:20 AM CDT

## 2025-04-17 RX ORDER — MELOXICAM 15 MG/1
15 TABLET ORAL
Qty: 90 TABLET | Refills: 0 | Status: SHIPPED | OUTPATIENT
Start: 2025-04-17

## 2025-04-17 RX ORDER — POTASSIUM CHLORIDE 20 MEQ/1
20 TABLET, EXTENDED RELEASE ORAL
Qty: 90 TABLET | Refills: 0 | Status: SHIPPED | OUTPATIENT
Start: 2025-04-17

## 2025-04-17 NOTE — TELEPHONE ENCOUNTER
No care due was identified.  [facility] Embedded Care Due Messages. Reference number: [id number].   4/17/2025 3:46:03 PM CDT

## 2025-04-28 ENCOUNTER — PATIENT OUTREACH (OUTPATIENT)
Dept: ADMINISTRATIVE | Facility: HOSPITAL | Age: 55
End: 2025-04-28
Payer: MEDICAID

## 2025-04-28 ENCOUNTER — PATIENT MESSAGE (OUTPATIENT)
Dept: ADMINISTRATIVE | Facility: HOSPITAL | Age: 55
End: 2025-04-28
Payer: MEDICAID

## 2025-04-28 NOTE — PROGRESS NOTES
Population Health Chart Review & Patient Outreach Details      Additional [facility] Health Notes:      BP Readings from Last 3 Encounters:   08/17/24 139/83   07/11/24 133/78   04/25/24 112/82         Non-compliant report chart audits for HYPERTENSION MANAGEMENT     Outreach to patient in reference to hypertension management             DUE FOR OFFICE VISIT.  LAST SEEN >12 MONTHS             Updates Requested / Reviewed:        Health Maintenance Topics Overdue:        VBHM Score: 2     Hemoglobin A1c                           Health Maintenance Topic(s) Outreach Outcomes & Actions Taken:    Blood Pressure - Outreach Outcomes & Actions Taken  : msg    Primary Care Appt - Outreach Outcomes & Actions Taken  : msg

## 2025-07-02 RX ORDER — HYDROCHLOROTHIAZIDE 25 MG/1
25 TABLET ORAL
Qty: 90 TABLET | Refills: 0 | Status: SHIPPED | OUTPATIENT
Start: 2025-07-02

## 2025-07-02 RX ORDER — ROSUVASTATIN CALCIUM 20 MG/1
20 TABLET, COATED ORAL
Qty: 30 TABLET | Refills: 0 | Status: SHIPPED | OUTPATIENT
Start: 2025-07-02

## 2025-07-02 NOTE — TELEPHONE ENCOUNTER
Care Due:                  Date            Visit Type   Department     Provider  --------------------------------------------------------------------------------                                [facility]  Last Visit: 04-      [facility])   [facility]  Next Visit: None Scheduled  None         None Found                                                            Last  Test          Frequency    Reason                     Performed    Due Date  --------------------------------------------------------------------------------    Office Visit  12 months..  hydroCHLOROthiazide,       04- 04-                             meloxicam, potassium,                             rosuvastatin.............    CBC.........  12 months..  meloxicam................  08-   08-    CMP.........  12 months..  hydroCHLOROthiazide,       08-   08-                             meloxicam, potassium,                             rosuvastatin.............    Lipid Panel.  12 months..  rosuvastatin.............  08-   08-    Health [facility] Embedded Care Due Messages. Reference number: [id number].   7/02/2025 2:20:48 AM CDT

## 2025-08-01 ENCOUNTER — OFFICE VISIT (OUTPATIENT)
Dept: FAMILY MEDICINE | Facility: CLINIC | Age: 55
End: 2025-08-01
Payer: MEDICAID

## 2025-08-01 ENCOUNTER — LAB VISIT (OUTPATIENT)
Dept: LAB | Facility: HOSPITAL | Age: 55
End: 2025-08-01
Attending: NURSE PRACTITIONER
Payer: MEDICAID

## 2025-08-01 VITALS
HEIGHT: 60 IN | HEART RATE: 88 BPM | DIASTOLIC BLOOD PRESSURE: 88 MMHG | OXYGEN SATURATION: 96 % | WEIGHT: 192 LBS | TEMPERATURE: 98 F | SYSTOLIC BLOOD PRESSURE: 134 MMHG | BODY MASS INDEX: 37.69 KG/M2

## 2025-08-01 DIAGNOSIS — Z13.220 SCREENING FOR HYPERLIPIDEMIA: ICD-10-CM

## 2025-08-01 DIAGNOSIS — E66.09 CLASS 2 OBESITY DUE TO EXCESS CALORIES WITH BODY MASS INDEX (BMI) OF 37.0 TO 37.9 IN ADULT, UNSPECIFIED WHETHER SERIOUS COMORBIDITY PRESENT: ICD-10-CM

## 2025-08-01 DIAGNOSIS — E66.812 CLASS 2 OBESITY DUE TO EXCESS CALORIES WITH BODY MASS INDEX (BMI) OF 37.0 TO 37.9 IN ADULT, UNSPECIFIED WHETHER SERIOUS COMORBIDITY PRESENT: ICD-10-CM

## 2025-08-01 DIAGNOSIS — Z12.31 ENCOUNTER FOR SCREENING MAMMOGRAM FOR BREAST CANCER: Primary | ICD-10-CM

## 2025-08-01 DIAGNOSIS — E78.5 HYPERLIPIDEMIA, UNSPECIFIED HYPERLIPIDEMIA TYPE: ICD-10-CM

## 2025-08-01 DIAGNOSIS — Z23 NEED FOR SHINGLES VACCINE: ICD-10-CM

## 2025-08-01 DIAGNOSIS — I10 HYPERTENSION, ESSENTIAL: ICD-10-CM

## 2025-08-01 DIAGNOSIS — Z23 NEED FOR PNEUMOCOCCAL VACCINATION: ICD-10-CM

## 2025-08-01 DIAGNOSIS — Z12.31 ENCOUNTER FOR SCREENING MAMMOGRAM FOR BREAST CANCER: ICD-10-CM

## 2025-08-01 DIAGNOSIS — F32.A DEPRESSION, UNSPECIFIED DEPRESSION TYPE: ICD-10-CM

## 2025-08-01 DIAGNOSIS — F17.210 SMOKING GREATER THAN 20 PACK YEARS: ICD-10-CM

## 2025-08-01 DIAGNOSIS — K21.9 GASTROESOPHAGEAL REFLUX DISEASE, UNSPECIFIED WHETHER ESOPHAGITIS PRESENT: ICD-10-CM

## 2025-08-01 LAB
ABSOLUTE EOSINOPHIL (SMH): 0.34 K/UL
ABSOLUTE MONOCYTE (SMH): 0.4 K/UL (ref 0.3–1)
ABSOLUTE NEUTROPHIL COUNT (SMH): 3.4 K/UL (ref 1.8–7.7)
ALBUMIN SERPL-MCNC: 4.3 G/DL (ref 3.5–5.2)
ALP SERPL-CCNC: 79 UNIT/L (ref 55–135)
ALT SERPL-CCNC: 23 UNIT/L (ref 10–44)
ANION GAP (SMH): 7 MMOL/L (ref 8–16)
AST SERPL-CCNC: 20 UNIT/L (ref 10–40)
BASOPHILS # BLD AUTO: 0.03 K/UL
BASOPHILS NFR BLD AUTO: 0.5 %
BILIRUB SERPL-MCNC: 0.6 MG/DL (ref 0.1–1)
BUN SERPL-MCNC: 13 MG/DL (ref 6–20)
CALCIUM SERPL-MCNC: 9.1 MG/DL (ref 8.7–10.5)
CHLORIDE SERPL-SCNC: 107 MMOL/L (ref 95–110)
CHOLEST SERPL-MCNC: 178 MG/DL (ref 120–199)
CHOLEST/HDLC SERPL: 3 {RATIO} (ref 2–5)
CO2 SERPL-SCNC: 26 MMOL/L (ref 23–29)
CREAT SERPL-MCNC: 0.6 MG/DL (ref 0.5–1.4)
EAG (SMH): 120 MG/DL (ref 68–131)
ERYTHROCYTE [DISTWIDTH] IN BLOOD BY AUTOMATED COUNT: 18.4 % (ref 11.5–14.5)
GFR SERPLBLD CREATININE-BSD FMLA CKD-EPI: >60 ML/MIN/1.73/M2
GLUCOSE SERPL-MCNC: 99 MG/DL (ref 70–110)
HBA1C MFR BLD: 5.8 % (ref 4.5–6.2)
HCT VFR BLD AUTO: 38.5 % (ref 37–48.5)
HDLC SERPL-MCNC: 59 MG/DL (ref 40–75)
HDLC SERPL: 33.1 % (ref 20–50)
HGB BLD-MCNC: 11.8 GM/DL (ref 12–16)
IMM GRANULOCYTES # BLD AUTO: 0.01 K/UL (ref 0–0.04)
IMM GRANULOCYTES NFR BLD AUTO: 0.2 % (ref 0–0.5)
LDLC SERPL CALC-MCNC: 99.4 MG/DL (ref 63–159)
LYMPHOCYTES # BLD AUTO: 1.93 K/UL (ref 1–4.8)
MCH RBC QN AUTO: 18.6 PG (ref 27–31)
MCHC RBC AUTO-ENTMCNC: 30.6 G/DL (ref 32–36)
MCV RBC AUTO: 61 FL (ref 82–98)
NONHDLC SERPL-MCNC: 119 MG/DL
NUCLEATED RBC (/100WBC) (SMH): 0 /100 WBC
PLATELET # BLD AUTO: 259 K/UL (ref 150–450)
PMV BLD AUTO: 10.1 FL (ref 9.2–12.9)
POTASSIUM SERPL-SCNC: 3.9 MMOL/L (ref 3.5–5.1)
PROT SERPL-MCNC: 7.2 GM/DL (ref 6–8.4)
RBC # BLD AUTO: 6.36 M/UL (ref 4–5.4)
RELATIVE EOSINOPHIL (SMH): 5.6 % (ref 0–8)
RELATIVE LYMPHOCYTE (SMH): 31.7 % (ref 18–48)
RELATIVE MONOCYTE (SMH): 6.6 % (ref 4–15)
RELATIVE NEUTROPHIL (SMH): 55.4 % (ref 38–73)
SODIUM SERPL-SCNC: 140 MMOL/L (ref 136–145)
TRIGL SERPL-MCNC: 98 MG/DL (ref 30–150)
TSH SERPL-ACNC: 2.62 UIU/ML (ref 0.34–5.6)
WBC # BLD AUTO: 6.08 K/UL (ref 3.9–12.7)

## 2025-08-01 PROCEDURE — 83036 HEMOGLOBIN GLYCOSYLATED A1C: CPT

## 2025-08-01 PROCEDURE — 99999 PR PBB SHADOW E&M-EST. PATIENT-LVL III: CPT | Mod: PBBFAC,,, | Performed by: NURSE PRACTITIONER

## 2025-08-01 PROCEDURE — 36415 COLL VENOUS BLD VENIPUNCTURE: CPT

## 2025-08-01 PROCEDURE — 85025 COMPLETE CBC W/AUTO DIFF WBC: CPT

## 2025-08-01 PROCEDURE — 84075 ASSAY ALKALINE PHOSPHATASE: CPT

## 2025-08-01 PROCEDURE — 99213 OFFICE O/P EST LOW 20 MIN: CPT | Mod: PBBFAC,PN | Performed by: NURSE PRACTITIONER

## 2025-08-01 PROCEDURE — 84443 ASSAY THYROID STIM HORMONE: CPT

## 2025-08-01 PROCEDURE — 82465 ASSAY BLD/SERUM CHOLESTEROL: CPT

## 2025-08-01 RX ORDER — PANTOPRAZOLE SODIUM 20 MG/1
40 TABLET, DELAYED RELEASE ORAL DAILY
Qty: 180 TABLET | Refills: 3 | Status: SHIPPED | OUTPATIENT
Start: 2025-08-01

## 2025-08-01 RX ORDER — ROSUVASTATIN CALCIUM 20 MG/1
20 TABLET, COATED ORAL DAILY
Qty: 30 TABLET | Refills: 0 | Status: SHIPPED | OUTPATIENT
Start: 2025-08-01

## 2025-08-01 RX ORDER — ZOSTER VACCINE RECOMBINANT, ADJUVANTED 50 MCG/0.5
0.5 KIT INTRAMUSCULAR ONCE
Qty: 1 EACH | Refills: 0 | Status: SHIPPED | OUTPATIENT
Start: 2025-08-01 | End: 2025-08-01

## 2025-08-01 RX ORDER — POTASSIUM CHLORIDE 20 MEQ/1
20 TABLET, EXTENDED RELEASE ORAL DAILY
Qty: 90 TABLET | Refills: 0 | Status: SHIPPED | OUTPATIENT
Start: 2025-08-01

## 2025-08-01 RX ORDER — LOSARTAN POTASSIUM 100 MG/1
100 TABLET ORAL DAILY
Qty: 90 TABLET | Refills: 3 | Status: SHIPPED | OUTPATIENT
Start: 2025-08-01 | End: 2026-08-01

## 2025-08-01 RX ORDER — HYDROCHLOROTHIAZIDE 25 MG/1
25 TABLET ORAL DAILY
Qty: 90 TABLET | Refills: 0 | Status: SHIPPED | OUTPATIENT
Start: 2025-08-01

## 2025-08-01 NOTE — PROGRESS NOTES
"Subjective:       Patient ID: Mirtha Stewart is a 55 y.o. female.    Chief Complaint: Annual Exam    History of Present Illness    CHIEF COMPLAINT:  Ms. Stewart presents today for follow up of high blood pressure.    HYPERTENSION:  She has a history of high blood pressure, previously managed with medication but discontinued after weight loss. Recently, with weight gain, her blood pressure has elevated again with a recent home reading of 148/98, noting specifically that the diastolic pressure was elevated. She endorses ability to physically perceive when her blood pressure is high and anticipates needing to restart blood pressure medication. She was previously taking losartan for blood pressure.Still taking HCTZ 25 prn for LE edema.     DEPRESSION:  She reports ongoing depression that began following a significant injury and subsequent job loss. She describes experiencing profound impact on her life, having previously been active as a  and  before her injury. She is currently not taking any antidepressant medications and reports significant difficulty with motivation, often spending entire days on the couch and struggling to complete basic daily activities. She expresses challenges with maintaining mental focus and daily functioning. She has extensive history of trying multiple antidepressant medications, including genetic testing with her psychiatrist, but reports no sustained success with previous treatments. She acknowledges understanding proper nutrition but notes her mental health significantly impairs her ability to consistently maintain healthy habits. She describes her depression as "pretty bad" and indicates she has recently discontinued all psychiatric medications due to lack of effectiveness.    WEIGHT MANAGEMENT:  She reports long-standing difficulty losing weight. She recently attempted semaglutide for weight loss, taking medication for approximately nine weeks with minimal " weight loss of only one pound. She describes dieting strictly prior to her daughter's wedding in November, followed by a period of less disciplined eating where she consumed foods without dietary restrictions. Consequently, she reports gaining approximately 15-20 lbs after the wedding. She expresses frustration with weight management and acknowledges challenges in maintaining consistent dietary habits.      SURGICAL HISTORY:  She reports three prior surgical procedures on the right knee.    CURRENT MEDICATIONS:  She is currently taking losartan for blood pressure, takes hydrochlorothiazide occasionally for fluid retention and swelling, uses Flexeril 3 times per week at night for right knee pain, takes meloxicam with pantoprazole to manage GI side effects, and is on Crestor for cholesterol management. She is no longer taking Trazodone or fluoxetine. She reports medications are working fine.    Portions of this note were gen  erated with the assistance of ambient listening technology.    ROS:  Cardiovascular: -chest pain, +lower extremity edema  Musculoskeletal: +joint pain, +limb pain  Psychiatric: +depression       Review of patient's allergies indicates:   Allergen Reactions    Lexapro [escitalopram] Nausea Only     Dizziness, humming in ears, shakes     Social Drivers of Health     Tobacco Use: Low Risk  (8/1/2025)    Patient History     Smoking Tobacco Use: Never     Smokeless Tobacco Use: Never     Passive Exposure: Not on file   Alcohol Use: Not At Risk (5/21/2024)    AUDIT-C     Frequency of Alcohol Consumption: Monthly or less     Average Number of Drinks: 1 or 2     Frequency of Binge Drinking: Never   Financial Resource Strain: High Risk (5/21/2024)    Overall Financial Resource Strain (CARDIA)     Difficulty of Paying Living Expenses: Very hard   Food Insecurity: Food Insecurity Present (5/21/2024)    Hunger Vital Sign     Worried About Running Out of Food in the Last Year: Often true     Ran Out of Food  in the Last Year: Often true   Transportation Needs: No Transportation Needs (2024)    TRANSPORTATION NEEDS     Transportation : No   Physical Activity: Sufficiently Active (2024)    Exercise Vital Sign     Days of Exercise per Week: 4 days     Minutes of Exercise per Session: 100 min   Stress: Stress Concern Present (2024)    [employer] - Occupational Stress Questionnaire     Feeling of Stress : Very much   Housing Stability: Unknown (2024)    Housing Stability Vital Sign     Unable to Pay for Housing in the Last Year: No     Number of Times Moved in the Last Year: Not on file     Homeless in the Last Year: No   Depression: Low Risk  (2025)    Depression     Last PHQ-4: Flowsheet Data: 2   Utilities: At Risk (2024)    [facility] Utilities     Threatened with loss of utilities: Yes   Health Literacy: Adequate Health Literacy (2024)     Health Literacy     Frequency of need for help with medical instructions: Never   Social Isolation: Somewhat Isolated (2024)    Social Isolation     Social Isolation: 3      Past Medical History:   Diagnosis Date    Mixed hyperlipidemia     PUD (peptic ulcer disease)     teenager      Past Surgical History:   Procedure Laterality Date     SECTION      COLONOSCOPY N/A 2024    Procedure: COLONOSCOPY;  Surgeon: Bee Hart MD;  Location: [facility];  Service: Endoscopy;  Laterality: N/A;  lm/ppm    hysterctomy      KNEE SURGERY      *3      Social History[1]    Current Medications[2]      Objective:      Vitals:    25 0939   BP: 134/88   Pulse: 88   Temp: 98.3 °F (36.8 °C)   SpO2: 96%   Weight: 87.1 kg (192 lb)   Height: 5' (1.524 m)      Physical Exam  Constitutional:       Appearance: Normal appearance. She is obese.   HENT:      Head: Normocephalic and atraumatic.   Eyes:      Conjunctiva/sclera: Conjunctivae normal.   Neck:      Vascular: No carotid bruit.   Cardiovascular:      Rate and  Rhythm: Normal rate and regular rhythm.      Pulses:           Posterior tibial pulses are 2+ on the right side and 2+ on the left side.      Heart sounds: Normal heart sounds, S1 normal and S2 normal.   Pulmonary:      Effort: Pulmonary effort is normal. No respiratory distress.      Breath sounds: Normal breath sounds. No wheezing.   Abdominal:      General: Bowel sounds are normal. There is no distension.      Palpations: Abdomen is soft. There is no mass.      Tenderness: There is no abdominal tenderness.   Musculoskeletal:      Right lower leg: No edema.      Left lower leg: No edema.   Skin:     General: Skin is warm.   Neurological:      Mental Status: She is alert and oriented to person, place, and time.   Psychiatric:         Mood and Affect: Mood normal.         Behavior: Behavior normal.         Assessment:       1. Encounter for screening mammogram for breast cancer    2. Hypertension, essential    3. Hyperlipidemia, unspecified hyperlipidemia type    4. Smoking greater than 20 pack years    5. Screening for hyperlipidemia    6. Class 2 obesity due to excess calories with body mass index (BMI) of 37.0 to 37.9 in adult, unspecified whether serious comorbidity present    7. Depression, unspecified depression type    8. Gastroesophageal reflux disease, unspecified whether esophagitis present    9. Need for pneumococcal vaccination    10. Need for shingles vaccine        Plan:       Assessment & Plan    IMPRESSION:  - Restarted losartan for BP management due to recent weight gain and reported hypertension readings.  - Evaluated depression management, noting extensive history with various antidepressants and genetic testing.  - Unable to prescribe phentermine (Adipex) for weight loss due to state regulations for NPs.    Portions of this note were generated with the assistance of ambient listening technology.    PLAN SUMMARY:  - Order labs and mammogram  - Resume medication therapy for hypertension  - Add  losartan to HCTZ for better blood pressure control  - Continue Crestor (rosuvastatin) for cholesterol management  - Initiate pantoprazole (Protonix) for GERD symptoms  - Continue flexeril (cyclobenzaprine) and meloxicam for pain management  - Consider alternative weight loss medications  - Recommend increasing physical activity, starting with 10-minute weekly bike rides  - Educate on sodium intake reduction for fluid retention management  - Discuss Adipex (phentermine) with Dr. Garcia for weight management  - Schedule appointment with Dr. Garcia to discuss Adipex     FOLLOW-UP:  - Ordered labs and mammogram.       Mirtha was seen today for annual exam.    Diagnoses and all orders for this visit:    Encounter for screening mammogram for breast cancer  -     Mammo Digital Screening Bilat w/ Sam (XPD); Future  -     Hemoglobin A1c; Future    Hypertension, essential  -     Comprehensive Metabolic Panel; Future  -     CBC Auto Differential; Future  -     hydroCHLOROthiazide (HYDRODIURIL) 25 MG tablet; Take 1 tablet (25 mg total) by mouth once daily.  -     potassium chloride SA (K-DUR,KLOR-CON) 20 MEQ tablet; Take 1 tablet (20 mEq total) by mouth once daily.  -     losartan (COZAAR) 100 MG tablet; Take 1 tablet (100 mg total) by mouth once daily.    Hyperlipidemia, unspecified hyperlipidemia type  -     rosuvastatin (CRESTOR) 20 MG tablet; Take 1 tablet (20 mg total) by mouth once daily.    Smoking greater than 20 pack years    Screening for hyperlipidemia  -     Lipid Panel; Future    Class 2 obesity due to excess calories with body mass index (BMI) of 37.0 to 37.9 in adult, unspecified whether serious comorbidity present  -     TSH; Future    Depression, unspecified depression type    Gastroesophageal reflux disease, unspecified whether esophagitis present  -     pantoprazole (PROTONIX) 20 MG tablet; Take 2 tablets (40 mg total) by mouth once daily.    Need for pneumococcal vaccination  -     pneumococcal 23-justus ps  (PNEUMOVAX 23) 25 mcg/0.5 mL vaccine; Inject 0.5 mLs into the muscle once. for 1 dose    Need for shingles vaccine  -     varicella-zoster gE-AS01B, PF, (SHINGRIX, PF,) 50 mcg/0.5 mL injection; Inject 0.5 mLs into the muscle once. for 1 dose               No follow-ups on file.        This note was generated with the assistance of ambient listening technology. Verbal consent was obtained by the patient and accompanying visitor(s) for the recording of patient appointment to facilitate this note. I attest to having reviewed and edited the generated note for accuracy, though some syntax or spelling errors may persist. Please contact the author of this note for any clarification.      Divya Pace, JINGP-C  Family Medicine         [1]   Social History  Socioeconomic History    Marital status:    [2]   Current Outpatient Medications:     cyclobenzaprine (FLEXERIL) 10 MG tablet, Take 10 mg by mouth nightly., Disp: , Rfl:     diclofenac (FLECTOR) 1.3 % PT12, APPLY 1 PATCH TOPICALLY ONCE DAILY (MAY WEAR UP TO 12HOURS.), Disp: , Rfl:     diclofenac sodium (SOLARAZE) 3 % gel, Apply 1 application topically 2 (two) times daily., Disp: , Rfl:     lidocaine (LIDODERM) 5 %, APPLY 1 PATCH TOPICALLY ONCE DAILY (MAY WEAR UP TO 12HOURS.), Disp: , Rfl:     linaCLOtide (LINZESS) 290 mcg Cap capsule, Take 1 capsule (290 mcg total) by mouth before breakfast., Disp: 30 capsule, Rfl: 6    meloxicam (MOBIC) 15 MG tablet, Take 1 tablet by mouth once daily, Disp: 90 tablet, Rfl: 0    mupirocin (BACTROBAN) 2 % ointment, by Nasal route 2 (two) times daily., Disp: 22 g, Rfl: 0    ondansetron (ZOFRAN) 4 MG tablet, Take 1 tablet (4 mg total) by mouth every 6 (six) hours as needed for Nausea., Disp: 20 tablet, Rfl: 0    hydroCHLOROthiazide (HYDRODIURIL) 25 MG tablet, Take 1 tablet (25 mg total) by mouth once daily., Disp: 90 tablet, Rfl: 0    losartan (COZAAR) 100 MG tablet, Take 1 tablet (100 mg total) by mouth once daily., Disp: 90  tablet, Rfl: 3    pantoprazole (PROTONIX) 20 MG tablet, Take 2 tablets (40 mg total) by mouth once daily., Disp: 180 tablet, Rfl: 3    pneumococcal 23-justus ps (PNEUMOVAX 23) 25 mcg/0.5 mL vaccine, Inject 0.5 mLs into the muscle once. for 1 dose, Disp: 0.5 mL, Rfl: 0    potassium chloride SA (K-DUR,KLOR-CON) 20 MEQ tablet, Take 1 tablet (20 mEq total) by mouth once daily., Disp: 90 tablet, Rfl: 0    rosuvastatin (CRESTOR) 20 MG tablet, Take 1 tablet (20 mg total) by mouth once daily., Disp: 30 tablet, Rfl: 0    varicella-zoster gE-AS01B, PF, (SHINGRIX, PF,) 50 mcg/0.5 mL injection, Inject 0.5 mLs into the muscle once. for 1 dose, Disp: 1 each, Rfl: 0

## 2025-08-05 ENCOUNTER — TELEPHONE (OUTPATIENT)
Dept: FAMILY MEDICINE | Facility: CLINIC | Age: 55
End: 2025-08-05
Payer: MEDICAID

## 2025-08-05 DIAGNOSIS — K21.9 GASTROESOPHAGEAL REFLUX DISEASE, UNSPECIFIED WHETHER ESOPHAGITIS PRESENT: ICD-10-CM

## 2025-08-05 RX ORDER — PANTOPRAZOLE SODIUM 20 MG/1
20 TABLET, DELAYED RELEASE ORAL DAILY
Qty: 90 TABLET | Refills: 3 | Status: SHIPPED | OUTPATIENT
Start: 2025-08-05

## 2025-08-05 NOTE — TELEPHONE ENCOUNTER
----- Message from MARLENY Curry sent at 8/4/2025  5:00 PM CDT -----  Blood work is stable. Hgb A1c shows prediabetes. Remember to exercise at least 3 times a week. Choose beverages that have zero sugar, reduce simple carbs such as rice, pasta and bread. Reduce fried   and fatty foods. Increase intake of fruits and vegetables. We can also start you on a low dose of metformin to prevent that number from going into the diabetic range, some people lose weight on   metformin. Let me know if you'd like to start that.     ----- Message -----  From: Lab, Background User  Sent: 8/1/2025  10:47 AM CDT  To: MARLENY Perez    
Called pt no answer left vm to call back  
normal for race

## 2025-08-13 ENCOUNTER — DOCUMENTATION ONLY (OUTPATIENT)
Dept: PSYCHIATRY | Facility: CLINIC | Age: 55
End: 2025-08-13
Payer: MEDICAID

## 2025-08-16 ENCOUNTER — PATIENT MESSAGE (OUTPATIENT)
Dept: FAMILY MEDICINE | Facility: CLINIC | Age: 55
End: 2025-08-16
Payer: MEDICAID

## 2025-08-18 DIAGNOSIS — I10 HYPERTENSION, ESSENTIAL: ICD-10-CM

## 2025-08-18 DIAGNOSIS — E78.5 HYPERLIPIDEMIA, UNSPECIFIED HYPERLIPIDEMIA TYPE: ICD-10-CM

## 2025-08-18 DIAGNOSIS — M25.569 KNEE PAIN, UNSPECIFIED CHRONICITY, UNSPECIFIED LATERALITY: Primary | ICD-10-CM

## 2025-08-18 RX ORDER — LOSARTAN POTASSIUM 100 MG/1
100 TABLET ORAL DAILY
Qty: 90 TABLET | Refills: 3 | Status: SHIPPED | OUTPATIENT
Start: 2025-08-18 | End: 2026-08-18

## 2025-08-18 RX ORDER — CYCLOBENZAPRINE HCL 10 MG
10 TABLET ORAL NIGHTLY
Qty: 30 TABLET | Refills: 2 | Status: SHIPPED | OUTPATIENT
Start: 2025-08-18

## 2025-08-18 RX ORDER — ROSUVASTATIN CALCIUM 20 MG/1
20 TABLET, COATED ORAL DAILY
Qty: 90 TABLET | Refills: 3 | Status: SHIPPED | OUTPATIENT
Start: 2025-08-18

## 2025-08-18 RX ORDER — POTASSIUM CHLORIDE 20 MEQ/1
20 TABLET, EXTENDED RELEASE ORAL DAILY
Qty: 90 TABLET | Refills: 3 | Status: SHIPPED | OUTPATIENT
Start: 2025-08-18

## 2025-08-19 RX ORDER — MELOXICAM 15 MG/1
15 TABLET ORAL
Qty: 90 TABLET | Refills: 0 | Status: SHIPPED | OUTPATIENT
Start: 2025-08-19